# Patient Record
Sex: FEMALE | Race: BLACK OR AFRICAN AMERICAN | NOT HISPANIC OR LATINO | Employment: FULL TIME | URBAN - METROPOLITAN AREA
[De-identification: names, ages, dates, MRNs, and addresses within clinical notes are randomized per-mention and may not be internally consistent; named-entity substitution may affect disease eponyms.]

---

## 2017-12-21 ENCOUNTER — HISTORICAL (OUTPATIENT)
Dept: ADMINISTRATIVE | Facility: HOSPITAL | Age: 32
End: 2017-12-21

## 2017-12-24 LAB — FINAL CULTURE: NORMAL

## 2018-01-04 ENCOUNTER — HISTORICAL (OUTPATIENT)
Dept: ADMINISTRATIVE | Facility: HOSPITAL | Age: 33
End: 2018-01-04

## 2018-01-07 LAB — FINAL CULTURE: NORMAL

## 2020-07-09 ENCOUNTER — HISTORICAL (OUTPATIENT)
Dept: ADMINISTRATIVE | Facility: HOSPITAL | Age: 35
End: 2020-07-09

## 2020-07-09 LAB
ABS NEUT (OLG): 2.74 X10(3)/MCL (ref 2.1–9.2)
ALBUMIN SERPL-MCNC: 3.9 GM/DL (ref 3.4–5)
ALBUMIN/GLOB SERPL: 0.8 RATIO (ref 1.1–2)
ALP SERPL-CCNC: 90 UNIT/L (ref 45–117)
ALT SERPL-CCNC: 33 UNIT/L (ref 12–78)
APPEARANCE, UA: CLEAR
AST SERPL-CCNC: 15 UNIT/L (ref 15–37)
BACTERIA #/AREA URNS AUTO: ABNORMAL /HPF
BASOPHILS # BLD AUTO: 0 X10(3)/MCL (ref 0–0.2)
BASOPHILS NFR BLD AUTO: 1 %
BILIRUB SERPL-MCNC: 0.6 MG/DL (ref 0.2–1)
BILIRUB UR QL STRIP: NEGATIVE
BILIRUBIN DIRECT+TOT PNL SERPL-MCNC: 0.2 MG/DL (ref 0–0.2)
BILIRUBIN DIRECT+TOT PNL SERPL-MCNC: 0.4 MG/DL
BUN SERPL-MCNC: 14 MG/DL (ref 7–18)
C3 SERPL-MCNC: 179 MG/DL (ref 80–173)
C4 SERPL-MCNC: 35.2 MG/DL (ref 13–46)
CALCIUM SERPL-MCNC: 9.1 MG/DL (ref 8.5–10.1)
CHLORIDE SERPL-SCNC: 108 MMOL/L (ref 98–107)
CO2 SERPL-SCNC: 22 MMOL/L (ref 21–32)
COLOR UR: NORMAL
CREAT SERPL-MCNC: 1.3 MG/DL (ref 0.6–1.3)
CREAT UR-MCNC: 160 MG/DL
DEPRECATED CALCIDIOL+CALCIFEROL SERPL-MC: 14.3 NG/ML (ref 30–80)
EOSINOPHIL # BLD AUTO: 0.1 X10(3)/MCL (ref 0–0.9)
EOSINOPHIL NFR BLD AUTO: 2 %
ERYTHROCYTE [DISTWIDTH] IN BLOOD BY AUTOMATED COUNT: 12.3 % (ref 11.5–14.5)
ERYTHROCYTE [SEDIMENTATION RATE] IN BLOOD: 14 MM/HR (ref 0–20)
GLOBULIN SER-MCNC: 4.6 GM/ML (ref 2.3–3.5)
GLUCOSE (UA): NEGATIVE
GLUCOSE SERPL-MCNC: 125 MG/DL (ref 74–106)
HBV CORE AB SERPL QL IA: NONREACTIVE
HBV CORE IGM SERPL QL IA: NONREACTIVE
HBV SURFACE AG SERPL QL IA: NONREACTIVE
HCT VFR BLD AUTO: 39 % (ref 35–46)
HCV AB SERPL QL IA: NONREACTIVE
HGB BLD-MCNC: 12.7 GM/DL (ref 12–16)
HGB UR QL STRIP: NEGATIVE
HYALINE CASTS #/AREA URNS LPF: ABNORMAL /LPF
IMM GRANULOCYTES # BLD AUTO: 0.01 10*3/UL
IMM GRANULOCYTES NFR BLD AUTO: 0 %
KETONES UR QL STRIP: NEGATIVE
LEUKOCYTE ESTERASE UR QL STRIP: NEGATIVE
LYMPHOCYTES # BLD AUTO: 2.1 X10(3)/MCL (ref 0.6–4.6)
LYMPHOCYTES NFR BLD AUTO: 40 %
MCH RBC QN AUTO: 29.1 PG (ref 26–34)
MCHC RBC AUTO-ENTMCNC: 32.6 GM/DL (ref 31–37)
MCV RBC AUTO: 89.4 FL (ref 80–100)
MONOCYTES # BLD AUTO: 0.3 X10(3)/MCL (ref 0.1–1.3)
MONOCYTES NFR BLD AUTO: 5 %
NEG CONT SPOT COUNT: NORMAL
NEUTROPHILS # BLD AUTO: 2.74 X10(3)/MCL (ref 2.1–9.2)
NEUTROPHILS NFR BLD AUTO: 52 %
NITRITE UR QL STRIP: NEGATIVE
PANEL A SPOT COUNT: 0
PANEL B SPOT COUNT: 0
PH UR STRIP: 6.5 [PH] (ref 4.5–8)
PLATELET # BLD AUTO: 201 X10(3)/MCL (ref 130–400)
PMV BLD AUTO: 12.6 FL (ref 7.4–10.4)
POS CONT SPOT COUNT: NORMAL
POTASSIUM SERPL-SCNC: 3.8 MMOL/L (ref 3.5–5.1)
PROT SERPL-MCNC: 8.5 GM/DL (ref 6.4–8.2)
PROT UR QL STRIP: NEGATIVE
PROT UR STRIP-MCNC: 12.2 MG/DL
PROT/CREAT UR-RTO: 76.2 MG/GM
RBC # BLD AUTO: 4.36 X10(6)/MCL (ref 4–5.2)
RBC #/AREA URNS AUTO: ABNORMAL /HPF
SODIUM SERPL-SCNC: 139 MMOL/L (ref 136–145)
SP GR UR STRIP: 1.01 (ref 1–1.03)
SQUAMOUS #/AREA URNS LPF: ABNORMAL /LPF
T-SPOT.TB: NORMAL
T4 FREE SERPL-MCNC: 0.86 NG/DL (ref 0.76–1.46)
TSH SERPL-ACNC: 0.57 MIU/L (ref 0.36–3.74)
UROBILINOGEN UR STRIP-ACNC: NORMAL
WBC # SPEC AUTO: 5.3 X10(3)/MCL (ref 4.5–11)
WBC #/AREA URNS AUTO: ABNORMAL /HPF

## 2020-07-11 LAB — FINAL CULTURE: NO GROWTH

## 2021-09-13 ENCOUNTER — HISTORICAL (OUTPATIENT)
Dept: ADMINISTRATIVE | Facility: HOSPITAL | Age: 36
End: 2021-09-13

## 2021-10-19 ENCOUNTER — HISTORICAL (OUTPATIENT)
Dept: RADIOLOGY | Facility: HOSPITAL | Age: 36
End: 2021-10-19

## 2021-10-19 LAB — CREAT SERPL-MCNC: 1.16 MG/DL (ref 0.55–1.02)

## 2022-04-10 ENCOUNTER — HISTORICAL (OUTPATIENT)
Dept: ADMINISTRATIVE | Facility: HOSPITAL | Age: 37
End: 2022-04-10
Payer: MEDICAID

## 2022-04-26 VITALS
SYSTOLIC BLOOD PRESSURE: 100 MMHG | DIASTOLIC BLOOD PRESSURE: 69 MMHG | BODY MASS INDEX: 39.36 KG/M2 | HEIGHT: 62 IN | WEIGHT: 213.88 LBS

## 2022-05-06 DIAGNOSIS — Z01.818 PREOPERATIVE EXAMINATION, UNSPECIFIED: Primary | ICD-10-CM

## 2022-05-17 RX ORDER — GABAPENTIN 800 MG/1
TABLET ORAL
COMMUNITY
Start: 2022-04-12 | End: 2022-11-29 | Stop reason: SDUPTHER

## 2022-05-17 RX ORDER — TRAMADOL HYDROCHLORIDE 50 MG/1
TABLET ORAL
Status: ON HOLD | COMMUNITY
Start: 2021-12-21 | End: 2022-05-19 | Stop reason: HOSPADM

## 2022-05-17 RX ORDER — LASMIDITAN 100 MG/1
TABLET ORAL
COMMUNITY
Start: 2022-04-06 | End: 2022-08-04 | Stop reason: SDUPTHER

## 2022-05-17 RX ORDER — UBROGEPANT 100 MG/1
TABLET ORAL
COMMUNITY
Start: 2022-03-15 | End: 2022-07-28

## 2022-05-17 RX ORDER — METFORMIN HYDROCHLORIDE 500 MG/1
TABLET, EXTENDED RELEASE ORAL
COMMUNITY
Start: 2022-04-27 | End: 2023-03-27 | Stop reason: SDUPTHER

## 2022-05-17 RX ORDER — AMITRIPTYLINE HYDROCHLORIDE 50 MG/1
TABLET, FILM COATED ORAL
COMMUNITY
Start: 2022-04-12 | End: 2022-12-22

## 2022-05-18 ENCOUNTER — OFFICE VISIT (OUTPATIENT)
Dept: PREADMISSION TESTING | Facility: HOSPITAL | Age: 37
End: 2022-05-18
Attending: ORTHOPAEDIC SURGERY
Payer: MEDICAID

## 2022-05-18 VITALS — WEIGHT: 217.81 LBS | BODY MASS INDEX: 38.58 KG/M2

## 2022-05-18 DIAGNOSIS — Z01.818 PREOPERATIVE EXAMINATION, UNSPECIFIED: ICD-10-CM

## 2022-05-18 LAB
ANION GAP SERPL CALC-SCNC: 7 MEQ/L
BUN SERPL-MCNC: 10.3 MG/DL (ref 7–18.7)
CALCIUM SERPL-MCNC: 9.6 MG/DL (ref 8.4–10.2)
CHLORIDE SERPL-SCNC: 108 MMOL/L (ref 98–107)
CO2 SERPL-SCNC: 24 MMOL/L (ref 22–29)
CREAT SERPL-MCNC: 1.06 MG/DL (ref 0.55–1.02)
CREAT/UREA NIT SERPL: 10
GLUCOSE SERPL-MCNC: 117 MG/DL (ref 74–100)
POTASSIUM SERPL-SCNC: 3.5 MMOL/L (ref 3.5–5.1)
SODIUM SERPL-SCNC: 139 MMOL/L (ref 136–145)

## 2022-05-18 PROCEDURE — 80048 BASIC METABOLIC PNL TOTAL CA: CPT

## 2022-05-18 PROCEDURE — 36415 COLL VENOUS BLD VENIPUNCTURE: CPT

## 2022-05-18 PROCEDURE — 93005 ELECTROCARDIOGRAM TRACING: CPT

## 2022-05-18 RX ORDER — HYDROCODONE BITARTRATE AND ACETAMINOPHEN 5; 325 MG/1; MG/1
1 TABLET ORAL EVERY 6 HOURS PRN
Qty: 10 TABLET | Refills: 0 | Status: SHIPPED | OUTPATIENT
Start: 2022-05-18 | End: 2022-07-28

## 2022-05-18 NOTE — H&P
H&P completed on 5/19 has been reviewed, the patient has been examined and:  I concur with the findings and no changes have occurred since H&P was written.    There are no hospital problems to display for this patient.      Eben Navarro

## 2022-05-19 ENCOUNTER — HOSPITAL ENCOUNTER (OUTPATIENT)
Facility: HOSPITAL | Age: 37
Discharge: HOME | End: 2022-05-19
Attending: SURGERY | Admitting: SURGERY
Payer: MEDICAID

## 2022-05-19 DIAGNOSIS — G56.00 CARPAL TUNNEL SYNDROME: ICD-10-CM

## 2022-05-19 LAB
CTP QC/QA: YES
POCT GLUCOSE: 107 MG/DL (ref 70–110)
POCT GLUCOSE: 112 MG/DL (ref 70–110)
SARS-COV-2 AG RESP QL IA.RAPID: NEGATIVE

## 2022-05-19 PROCEDURE — 25000003 PHARM REV CODE 250: Performed by: SURGERY

## 2022-05-19 PROCEDURE — 64721 CARPAL TUNNEL SURGERY: CPT | Mod: 51,LT,, | Performed by: ORTHOPAEDIC SURGERY

## 2022-05-19 PROCEDURE — 71000015 HC POSTOP RECOV 1ST HR: Performed by: ORTHOPAEDIC SURGERY

## 2022-05-19 PROCEDURE — 63600175 PHARM REV CODE 636 W HCPCS: Performed by: SPECIALIST

## 2022-05-19 PROCEDURE — 01810 ANES PX NRV MUSC F/ARM WRST: CPT | Performed by: ORTHOPAEDIC SURGERY

## 2022-05-19 PROCEDURE — 36000707: Performed by: ORTHOPAEDIC SURGERY

## 2022-05-19 PROCEDURE — 64718 PR REVISE ULNAR NERVE AT ELBOW: ICD-10-PCS | Mod: LT,,, | Performed by: ORTHOPAEDIC SURGERY

## 2022-05-19 PROCEDURE — 71000033 HC RECOVERY, INTIAL HOUR: Performed by: ORTHOPAEDIC SURGERY

## 2022-05-19 PROCEDURE — 37000009 HC ANESTHESIA EA ADD 15 MINS: Performed by: ORTHOPAEDIC SURGERY

## 2022-05-19 PROCEDURE — 36000706: Performed by: ORTHOPAEDIC SURGERY

## 2022-05-19 PROCEDURE — 25000003 PHARM REV CODE 250: Performed by: SPECIALIST

## 2022-05-19 PROCEDURE — 37000008 HC ANESTHESIA 1ST 15 MINUTES: Performed by: ORTHOPAEDIC SURGERY

## 2022-05-19 PROCEDURE — 71000016 HC POSTOP RECOV ADDL HR: Performed by: ORTHOPAEDIC SURGERY

## 2022-05-19 PROCEDURE — 64718 REVISE ULNAR NERVE AT ELBOW: CPT | Mod: LT,,, | Performed by: ORTHOPAEDIC SURGERY

## 2022-05-19 PROCEDURE — 64721 PR REVISE MEDIAN N/CARPAL TUNNEL SURG: ICD-10-PCS | Mod: 51,LT,, | Performed by: ORTHOPAEDIC SURGERY

## 2022-05-19 RX ORDER — LIDOCAINE HYDROCHLORIDE 10 MG/ML
INJECTION INFILTRATION; PERINEURAL
Status: DISCONTINUED | OUTPATIENT
Start: 2022-05-19 | End: 2022-05-19 | Stop reason: HOSPADM

## 2022-05-19 RX ORDER — MIDAZOLAM HYDROCHLORIDE 5 MG/ML
INJECTION INTRAMUSCULAR; INTRAVENOUS
Status: DISCONTINUED
Start: 2022-05-19 | End: 2022-05-19 | Stop reason: HOSPADM

## 2022-05-19 RX ORDER — LIDOCAINE HYDROCHLORIDE 10 MG/ML
1 INJECTION, SOLUTION EPIDURAL; INFILTRATION; INTRACAUDAL; PERINEURAL ONCE
Status: DISCONTINUED | OUTPATIENT
Start: 2022-05-19 | End: 2022-08-04

## 2022-05-19 RX ORDER — BUPIVACAINE HYDROCHLORIDE 2.5 MG/ML
INJECTION, SOLUTION EPIDURAL; INFILTRATION; INTRACAUDAL
Status: DISCONTINUED
Start: 2022-05-19 | End: 2022-05-19 | Stop reason: HOSPADM

## 2022-05-19 RX ORDER — HYDROMORPHONE HYDROCHLORIDE 1 MG/ML
0.2 INJECTION, SOLUTION INTRAMUSCULAR; INTRAVENOUS; SUBCUTANEOUS EVERY 5 MIN PRN
Status: DISCONTINUED | OUTPATIENT
Start: 2022-05-19 | End: 2022-05-19 | Stop reason: HOSPADM

## 2022-05-19 RX ORDER — MIDAZOLAM HYDROCHLORIDE 5 MG/ML
5 INJECTION INTRAMUSCULAR; INTRAVENOUS ONCE
Status: COMPLETED | OUTPATIENT
Start: 2022-05-19 | End: 2022-05-19

## 2022-05-19 RX ORDER — MIDAZOLAM HYDROCHLORIDE 1 MG/ML
2 INJECTION INTRAMUSCULAR; INTRAVENOUS ONCE AS NEEDED
Status: CANCELLED | OUTPATIENT
Start: 2022-05-19 | End: 2033-10-15

## 2022-05-19 RX ORDER — LIDOCAINE HYDROCHLORIDE 10 MG/ML
INJECTION, SOLUTION EPIDURAL; INFILTRATION; INTRACAUDAL; PERINEURAL
Status: DISCONTINUED
Start: 2022-05-19 | End: 2022-05-19 | Stop reason: HOSPADM

## 2022-05-19 RX ORDER — CEFAZOLIN SODIUM 2 G/50ML
2 SOLUTION INTRAVENOUS ONCE
Status: DISCONTINUED | OUTPATIENT
Start: 2022-05-19 | End: 2022-11-08

## 2022-05-19 RX ORDER — ROPIVACAINE HYDROCHLORIDE 5 MG/ML
INJECTION, SOLUTION EPIDURAL; INFILTRATION; PERINEURAL
Status: DISCONTINUED
Start: 2022-05-19 | End: 2022-05-19 | Stop reason: HOSPADM

## 2022-05-19 RX ORDER — OXYCODONE AND ACETAMINOPHEN 5; 325 MG/1; MG/1
2 TABLET ORAL
Status: DISCONTINUED | OUTPATIENT
Start: 2022-05-19 | End: 2022-05-19 | Stop reason: HOSPADM

## 2022-05-19 RX ORDER — HYDROMORPHONE HYDROCHLORIDE 1 MG/ML
0.5 INJECTION, SOLUTION INTRAMUSCULAR; INTRAVENOUS; SUBCUTANEOUS EVERY 5 MIN PRN
Status: DISCONTINUED | OUTPATIENT
Start: 2022-05-19 | End: 2022-05-19 | Stop reason: HOSPADM

## 2022-05-19 RX ORDER — BUPIVACAINE HYDROCHLORIDE 2.5 MG/ML
INJECTION, SOLUTION EPIDURAL; INFILTRATION; INTRACAUDAL
Status: DISCONTINUED | OUTPATIENT
Start: 2022-05-19 | End: 2022-05-19 | Stop reason: HOSPADM

## 2022-05-19 RX ORDER — SODIUM CHLORIDE, SODIUM LACTATE, POTASSIUM CHLORIDE, CALCIUM CHLORIDE 600; 310; 30; 20 MG/100ML; MG/100ML; MG/100ML; MG/100ML
INJECTION, SOLUTION INTRAVENOUS CONTINUOUS
Status: DISCONTINUED | OUTPATIENT
Start: 2022-05-19 | End: 2022-05-19 | Stop reason: HOSPADM

## 2022-05-19 RX ADMIN — HYDROMORPHONE HYDROCHLORIDE 0.5 MG: 1 INJECTION, SOLUTION INTRAMUSCULAR; INTRAVENOUS; SUBCUTANEOUS at 10:05

## 2022-05-19 RX ADMIN — HYDROMORPHONE HYDROCHLORIDE 0.5 MG: 1 INJECTION, SOLUTION INTRAMUSCULAR; INTRAVENOUS; SUBCUTANEOUS at 09:05

## 2022-05-19 RX ADMIN — OXYCODONE AND ACETAMINOPHEN 2 TABLET: 5; 325 TABLET ORAL at 10:05

## 2022-05-19 RX ADMIN — MIDAZOLAM 5 MG: 5 INJECTION INTRAMUSCULAR; INTRAVENOUS at 07:05

## 2022-05-19 RX ADMIN — SODIUM CHLORIDE, POTASSIUM CHLORIDE, SODIUM LACTATE AND CALCIUM CHLORIDE: 600; 310; 30; 20 INJECTION, SOLUTION INTRAVENOUS at 07:05

## 2022-05-19 NOTE — DISCHARGE SUMMARY
[facility] - Periop Services  Discharge Note  Short Stay    * No procedures listed *    OUTCOME: Patient tolerated treatment/procedure well without complication and is now ready for discharge.    DISPOSITION: Home or Self Care    FINAL DIAGNOSIS:  <principal problem not specified>    FOLLOWUP: In clinic    DISCHARGE INSTRUCTIONS:  No discharge procedures on file.     TIME SPENT ON DISCHARGE: 30 minutes

## 2022-05-19 NOTE — DISCHARGE INSTRUCTIONS
Keep arm elevated if hurting, may bend at the elbow but not the wrist for 2 weeks, dressing stays on til Monday then may remove, wash wound with soap and water pat dry, no baths showers only. Follow up with clinic in 2 weeks.

## 2022-05-19 NOTE — OP NOTE
Operative Report    CINDY JURADO  : 1985  MRN: [id number]    Date of Procedure: 2022     Pre-op Diagnosis:      Left Carpal tunnel syndrome  Left Cubital tunnel syndrome      Post-op Diagnosis:       Same     Post-Op Diagnosis Codes:     * Carpal tunnel syndrome of left wrist [G56.02]     * Cubital tunnel syndrome on left [G56.22]    Procedure:     Left Carpal tunnel release  Left Cubital tunnel release      Surgeon: Dr Eben Bolanos     Assisting Surgeon:      Dr Jean PGY5, Dr Navarro PGY3    Anesthesiologist:  see record    Anesthesia:      general    TOURNIQUET: 250mmHg  for 31 min    Estimated Blood Loss: 30 cc         Specimens: none    Indications for surgery:     Cindy Jurado is a 36 y.o. female patient with a longstanding history of carpal and cubital tunnel syndrome.  The patient has failed a trial of conservative care including night splinting and therapy.  While in clinic the risks, benefits, outcomes and alternatives of conservative vs operative management were discussed with the patient and informed consent was obtained for carpal and cubital tunnel release.    Procedure in detail:     The patient was placed under LMA anesthesia.  The upper extremity was prepped and draped in the usual sterile fashion. The limb was exsanguinated with eschmar and tourniquet was inflated to 250 mmHg. A 7-8 cm incision was made using a 15 blade scalpel slightly anterior to the cubital tunnel in the arm. Dissection was carried down through subcutaneous tissue. The fascia overlying the ulnar nerve was then released using tenotomy scissors. The totality of the ulnar nerve was identified and released using tenotomy scissors. Lovelace ligament as well as the heads of the FCU muscle were released. We confirmed complete release both proximally and distally.  Elbow was taken through a range of motion and the ulnar nerve was found to be stable in the groove.  A sponge was placed in the wound for  coverage.    We then turned our attention to the carpal tunnel. A 3-4 cm incision was placed in line with the flexed 4th digit over the carpal tunnel. Meticulous hemostasis was obtained using a combination of bipolar and Bovie cautery. The transverse carpal ligament was incised using a 15 blade scalpel after the skin and transverse palmar fascia was incised using a 15 blade scalpel. The transcarpal ligament was released in its entirety using tenotomy scissors.     Wound was thoroughly irrigated, the tourniquet was released and meticulous hemostasis was attained.  Elbow wound was closed 2-0 Vicryl in the subcutaneous tissue followed by 3-0 Monocryl in the skin. Several interrupted 4 nylons were then placed. Sterile dressing was applied.    Finger perfusion was confirmed at the end of the case.     I was present for all critical steps of the procedure    POSTOPERATIVE PLAN: Patient will keep their dressing clean/dry/intact and will restrict their activity to gentle range of motion exercises only until seen in follow up. A follow up appointment will be made for the patient before leaving the hospital to see us in clinic in 1-2 weeks.

## 2022-05-20 ENCOUNTER — TELEPHONE (OUTPATIENT)
Dept: ORTHOPEDICS | Facility: CLINIC | Age: 37
End: 2022-05-20
Payer: MEDICAID

## 2022-05-20 VITALS
DIASTOLIC BLOOD PRESSURE: 89 MMHG | RESPIRATION RATE: 15 BRPM | HEART RATE: 89 BPM | SYSTOLIC BLOOD PRESSURE: 132 MMHG | HEIGHT: 63 IN | OXYGEN SATURATION: 100 % | TEMPERATURE: 97 F | BODY MASS INDEX: 38.98 KG/M2 | WEIGHT: 220 LBS

## 2022-05-20 NOTE — TELEPHONE ENCOUNTER
Notified patient of your response. She stated that she can not take NSAIDS due to being on Eliquis. Patient asked, if possible to get more to make sure she has  enough for weekend. She stated she will run out before Monday.       Patient called early this morning with complaints of increased pain since surgery on Thursday. Patient stated she is taking Norco 5mg/325 mg 1 pill every 6 hours. She stated that her pain was so bad that she took 2 pills at 0200, and still in a lot of pain and not able to sleep. Pharmacy updated in chart at time of the conversation. Please advise.

## 2022-05-23 ENCOUNTER — OFFICE VISIT (OUTPATIENT)
Dept: ORTHOPEDICS | Facility: CLINIC | Age: 37
End: 2022-05-23
Payer: MEDICAID

## 2022-05-23 VITALS — BODY MASS INDEX: 38.98 KG/M2 | HEIGHT: 63 IN | WEIGHT: 220 LBS

## 2022-05-23 DIAGNOSIS — G56.00 CARPAL TUNNEL SYNDROME, UNSPECIFIED LATERALITY: Primary | ICD-10-CM

## 2022-05-23 PROCEDURE — 99024 PR POST-OP FOLLOW-UP VISIT: ICD-10-PCS | Mod: ,,, | Performed by: ORTHOPAEDIC SURGERY

## 2022-05-23 PROCEDURE — 99213 OFFICE O/P EST LOW 20 MIN: CPT | Mod: PBBFAC

## 2022-05-23 PROCEDURE — 99024 POSTOP FOLLOW-UP VISIT: CPT | Mod: ,,, | Performed by: ORTHOPAEDIC SURGERY

## 2022-05-23 NOTE — PROGRESS NOTES
[facility] Orthopaedic Surgery Clinic Note    In brief, Cindy Garcia is a 36 y.o. female left carpal cubital tunnel release on 05/19/2022.  Patient is having some pain postoperatively.  Today patient states that she was having a lot of pain postoperatively.  He is somewhat improved now.  She has been working on moving her fingers.  Denies any numbness tingling.      PMH:   Past Medical History:   Diagnosis Date    Diabetes mellitus     Digestive disorder     Hypertension        PSH:   Past Surgical History:   Procedure Laterality Date    ADENOIDECTOMY N/A     CARPAL TUNNEL RELEASE Left 5/19/2022    Procedure: RELEASE, CARPAL TUNNEL;  Surgeon: Eben Bolanos MD;  Location: [facility];  Service: Orthopedics;  Laterality: Left;    CHOLECYSTECTOMY N/A     laryngoscopy and other tracheoscopy N/A 12/23/2014    ULNAR TUNNEL RELEASE Left 5/19/2022    Procedure: RELEASE, CUBITAL TUNNEL;  Surgeon: Eben Bolanos MD;  Location: [facility];  Service: Orthopedics;  Laterality: Left;       SH:   Social History     Socioeconomic History    Marital status:    Tobacco Use    Smoking status: Never Smoker    Smokeless tobacco: Never Used   Substance and Sexual Activity    Alcohol use: Not Currently    Drug use: Never    Sexual activity: Yes       FH: No family history on file.    Allergies: Review of patient's allergies indicates:  No Known Allergies    ROS:  Constitutional- no fever, fatigue, weakness  Eye- no vision loss, eye redness, drainage, or pain  ENMT- no sore throat, ear pain, sinus pain/congestion, nasal congestion/drainage  Respiratory- no cough, wheezing, or shortness of breath  CV- no chest pain, no palpitations, no edema  GI- no N/V/D; no abdominal pain    Physical Exam:  There were no vitals filed for this visit.    General: NAD  Cardio: RRR by peripheral pulse  Pulm: Normal WOB on room air, symmetric chest rise  Abd: Soft, NT/ND    MSK:  Left upper extremity:  Surgical incision clean dry intact  with sutures in place  No signs of infection  Minimal bruising around surgical site  Neurovascular intact    Assessment:  28-year-old female status post left carpal and cubital tunnel release on 05/19/2022    -continue making a fist and moving the elbow, return to clinic at regularly scheduled appointment    Eben Navarro MD  [facility]  5/23/2022 3:04 PM

## 2022-05-24 NOTE — PROGRESS NOTES
ATTENDING ADDENDUM    Cindy Garcia  was evaluated at the time of the encounter with Dr Navarro PGY3.  HPI, PE and treatment plan was reviewed. Treatment plan was reasonable and necessary. Imaging was reviewed at the time of visit.

## 2022-06-08 ENCOUNTER — OFFICE VISIT (OUTPATIENT)
Dept: ORTHOPEDICS | Facility: CLINIC | Age: 37
End: 2022-06-08
Payer: MEDICAID

## 2022-06-08 VITALS — BODY MASS INDEX: 38.45 KG/M2 | WEIGHT: 217 LBS | HEIGHT: 63 IN

## 2022-06-08 DIAGNOSIS — G56.22 CUBITAL TUNNEL SYNDROME ON LEFT: ICD-10-CM

## 2022-06-08 DIAGNOSIS — G56.00 CARPAL TUNNEL SYNDROME, UNSPECIFIED LATERALITY: Primary | ICD-10-CM

## 2022-06-08 PROCEDURE — 99024 POSTOP FOLLOW-UP VISIT: CPT | Mod: ,,, | Performed by: ORTHOPAEDIC SURGERY

## 2022-06-08 PROCEDURE — 99213 OFFICE O/P EST LOW 20 MIN: CPT | Mod: PBBFAC

## 2022-06-08 PROCEDURE — 99024 PR POST-OP FOLLOW-UP VISIT: ICD-10-PCS | Mod: ,,, | Performed by: ORTHOPAEDIC SURGERY

## 2022-06-08 RX ORDER — HYDROCODONE BITARTRATE AND ACETAMINOPHEN 5; 325 MG/1; MG/1
1 TABLET ORAL EVERY 6 HOURS PRN
Qty: 18 TABLET | Refills: 0 | Status: SHIPPED | OUTPATIENT
Start: 2022-06-08 | End: 2022-06-15

## 2022-06-08 NOTE — PROGRESS NOTES
[facility] Orthopaedic Surgery Clinic Note    In brief, Cindy Garcia is a 36 y.o. female left carpal cubital tunnel release on 05/19/2022.  She returned 1 week postoperatively because she was having significant pain.  She feels like he has improved somewhat with regard to specially with her elbow.  Her wrist is still swollen and sore and she does still have difficulty moving her fingers.  She says that the entirety of her hand is numb.    PMH:   Past Medical History:   Diagnosis Date    Diabetes mellitus     Digestive disorder     Hypertension        PSH:   Past Surgical History:   Procedure Laterality Date    ADENOIDECTOMY N/A     CARPAL TUNNEL RELEASE Left 5/19/2022    Procedure: RELEASE, CARPAL TUNNEL;  Surgeon: Eben Bolanos MD;  Location: [facility];  Service: Orthopedics;  Laterality: Left;    CHOLECYSTECTOMY N/A     laryngoscopy and other tracheoscopy N/A 12/23/2014    ULNAR TUNNEL RELEASE Left 5/19/2022    Procedure: RELEASE, CUBITAL TUNNEL;  Surgeon: Eben Bolanos MD;  Location: [facility];  Service: Orthopedics;  Laterality: Left;       SH:   Social History     Socioeconomic History    Marital status:    Tobacco Use    Smoking status: Never Smoker    Smokeless tobacco: Never Used   Substance and Sexual Activity    Alcohol use: Not Currently    Drug use: Never    Sexual activity: Yes       FH: No family history on file.    Allergies: Review of patient's allergies indicates:  No Known Allergies    ROS:  Constitutional- no fever, fatigue, weakness  Eye- no vision loss, eye redness, drainage, or pain  ENMT- no sore throat, ear pain, sinus pain/congestion, nasal congestion/drainage  Respiratory- no cough, wheezing, or shortness of breath  CV- no chest pain, no palpitations, no edema  GI- no N/V/D; no abdominal pain    Physical Exam:  There were no vitals filed for this visit.    General: NAD  Cardio: RRR by peripheral pulse  Pulm: Normal WOB on room air, symmetric chest rise  Abd: Soft,  NT/ND    MSK:  Left upper extremity:  Surgical incision clean dry intact with sutures in place  No signs of infection  2 pt discrimination > 1 cm all digits today   +ain/pin/ulnar  Able to make full composite fist but slowly and with some pain with full flexion    Assessment:  28-year-old female status post left carpal and cubital tunnel release on 05/19/2022    -continue working on range of motion. Likely has some increased post-operative swelling than most.  sutures out today. Follow-up in 4 weeks for repeat range of motion and 2 pt discrimination check.     Corinne E Cloud, MD  [facility] Orthopaedic Surgery  6/8/2022 3:04 PM

## 2022-06-14 PROBLEM — G43.E09 CHRONIC MIGRAINE WITH AURA: Status: ACTIVE | Noted: 2022-06-14

## 2022-07-27 RX ORDER — TOPIRAMATE 100 MG/1
1 TABLET, FILM COATED ORAL 2 TIMES DAILY
COMMUNITY
Start: 2022-07-06 | End: 2022-08-04 | Stop reason: SDUPTHER

## 2022-07-27 RX ORDER — DILTIAZEM HYDROCHLORIDE 120 MG/1
1 CAPSULE, EXTENDED RELEASE ORAL DAILY
COMMUNITY
Start: 2022-07-06 | End: 2022-08-04

## 2022-07-28 ENCOUNTER — OFFICE VISIT (OUTPATIENT)
Dept: INTERNAL MEDICINE | Facility: CLINIC | Age: 37
End: 2022-07-28
Payer: MEDICAID

## 2022-07-28 VITALS
SYSTOLIC BLOOD PRESSURE: 116 MMHG | HEART RATE: 106 BPM | WEIGHT: 216 LBS | BODY MASS INDEX: 38.27 KG/M2 | RESPIRATION RATE: 18 BRPM | HEIGHT: 63 IN | DIASTOLIC BLOOD PRESSURE: 82 MMHG | TEMPERATURE: 98 F | OXYGEN SATURATION: 99 %

## 2022-07-28 DIAGNOSIS — Z11.4 SCREENING FOR HIV (HUMAN IMMUNODEFICIENCY VIRUS): ICD-10-CM

## 2022-07-28 DIAGNOSIS — Z11.59 SCREENING FOR VIRAL DISEASE: ICD-10-CM

## 2022-07-28 DIAGNOSIS — Z13.21 ENCOUNTER FOR VITAMIN DEFICIENCY SCREENING: ICD-10-CM

## 2022-07-28 DIAGNOSIS — Z13.1 SCREENING FOR DIABETES MELLITUS: ICD-10-CM

## 2022-07-28 DIAGNOSIS — Z13.0 SCREENING FOR IRON DEFICIENCY ANEMIA: ICD-10-CM

## 2022-07-28 DIAGNOSIS — Z13.29 SCREENING FOR THYROID DISORDER: ICD-10-CM

## 2022-07-28 DIAGNOSIS — Z11.3 ROUTINE SCREENING FOR STI (SEXUALLY TRANSMITTED INFECTION): ICD-10-CM

## 2022-07-28 DIAGNOSIS — Z13.220 SCREENING FOR LIPID DISORDERS: ICD-10-CM

## 2022-07-28 DIAGNOSIS — Z00.00 WELL ADULT EXAM: ICD-10-CM

## 2022-07-28 DIAGNOSIS — R52 GENERALIZED PAIN: Primary | ICD-10-CM

## 2022-07-28 PROCEDURE — 99214 OFFICE O/P EST MOD 30 MIN: CPT | Mod: S$PBB,,,

## 2022-07-28 PROCEDURE — 99215 OFFICE O/P EST HI 40 MIN: CPT | Mod: PBBFAC

## 2022-07-28 PROCEDURE — 3079F PR MOST RECENT DIASTOLIC BLOOD PRESSURE 80-89 MM HG: ICD-10-PCS | Mod: CPTII,,,

## 2022-07-28 PROCEDURE — 1159F PR MEDICATION LIST DOCUMENTED IN MEDICAL RECORD: ICD-10-PCS | Mod: CPTII,,,

## 2022-07-28 PROCEDURE — 3008F BODY MASS INDEX DOCD: CPT | Mod: CPTII,,,

## 2022-07-28 PROCEDURE — 3074F SYST BP LT 130 MM HG: CPT | Mod: CPTII,,,

## 2022-07-28 PROCEDURE — 3008F PR BODY MASS INDEX (BMI) DOCUMENTED: ICD-10-PCS | Mod: CPTII,,,

## 2022-07-28 PROCEDURE — 1160F RVW MEDS BY RX/DR IN RCRD: CPT | Mod: CPTII,,,

## 2022-07-28 PROCEDURE — 3079F DIAST BP 80-89 MM HG: CPT | Mod: CPTII,,,

## 2022-07-28 PROCEDURE — 3074F PR MOST RECENT SYSTOLIC BLOOD PRESSURE < 130 MM HG: ICD-10-PCS | Mod: CPTII,,,

## 2022-07-28 PROCEDURE — 99214 PR OFFICE/OUTPT VISIT, EST, LEVL IV, 30-39 MIN: ICD-10-PCS | Mod: S$PBB,,,

## 2022-07-28 PROCEDURE — 1159F MED LIST DOCD IN RCRD: CPT | Mod: CPTII,,,

## 2022-07-28 PROCEDURE — 1160F PR REVIEW ALL MEDS BY PRESCRIBER/CLIN PHARMACIST DOCUMENTED: ICD-10-PCS | Mod: CPTII,,,

## 2022-07-28 RX ORDER — TRAMADOL HYDROCHLORIDE 50 MG/1
50 TABLET ORAL EVERY 6 HOURS
Qty: 28 TABLET | Refills: 0 | Status: SHIPPED | OUTPATIENT
Start: 2022-07-28 | End: 2023-03-27

## 2022-07-28 RX ORDER — BACLOFEN 10 MG/1
10 TABLET ORAL 3 TIMES DAILY
Qty: 90 TABLET | Refills: 1 | Status: SHIPPED | OUTPATIENT
Start: 2022-07-28 | End: 2022-09-20 | Stop reason: SDUPTHER

## 2022-07-28 NOTE — ASSESSMENT & PLAN NOTE
Body mass index is 38.26 kg/m².  Goal BMI <30.  Aerobic exercise 150 minutes per week.  Avoid soda, simple sugars, sweets, excessive rice, pasta, potatoes or bread.   Choose brown options when available and portion control.  Limit fast foods and fried foods.   Choose complex carbs in moderation (ex: green, leafy vegetables, beans, oatmeal).  Eat plenty of fresh fruits and vegetables with lean meats daily.   Consider permanent healthy lifestyle changes.

## 2022-07-28 NOTE — PROGRESS NOTES
Subjective:       Patient ID: Cindy Garcia is a 36 y.o. female.    Chief Complaint: Establish Care and generalized pain    HPI   Cindy Garcia is a 36 y.o. Black female presenting in clinic today to Establish Care and generalized pain. Previous PCP: TEDDY Gottlieb. PMH PE, optic neuritis, peripheral neuropathy, HERB, Meniere's, TMJ, cluster headaches, GERD, HTN, diverticulosis, DDD w/ herniated disc, and fibromyalgia. She is followed by [facility] neuro clinic for headaches, Dr. Ramsay (ENT) in [place], Dr. Sharon Casiano (GYN), Dr. Chinmay Benitez (GI), and [facility] in [place] for multiple PE's, palpitations, and SOB. She was previously followed by Dr. Andersen (rheumatology) and a rheumatology clinic in [place].    Today she denies any acute complaints. She states that she has been experiencing generalized pain. Current pain is 6/10, aching. Denies radiation or numbness and tingling. She is currently taking tramadol and gabapentin with minimal relief. She does have DDD with a herniated disc, but she says she was diagnosed with fibromyalgia, but she could never get a definitive answer. She says the back and forth to [place] and multiple testing caused her to be frustrated. She just wants to feel better.    Denies smoking, drinking, or illicit drug use. Denies chest pain, shortness of breath, cough, headache, dizziness, weakness, abdominal pain, nausea, vomiting, diarrhea, constipation, dysuria, depression, anxiety, SI, and HI.    Cervical Cancer Screening - Next appt is in 9/2022, she will send records.   Breast Cancer Screening - Deferred due to age.  Colon Cancer Screening - Deferred due to age.  Vaccinations: Declines vaccines.         Review of Systems   Constitutional: Negative.    HENT: Negative.    Eyes: Negative.    Respiratory: Negative.    Cardiovascular: Negative.    Gastrointestinal: Negative.    Endocrine: Negative.    Genitourinary: Negative.    Musculoskeletal:  Positive for arthralgias and back pain.   Integumentary:  Negative.   Allergic/Immunologic: Negative.    Neurological: Negative.    Hematological: Negative.    Psychiatric/Behavioral: Negative.    All other systems reviewed and are negative.        Objective:      Physical Exam  Constitutional:       Appearance: Normal appearance. She is normal weight.   HENT:      Head: Normocephalic and atraumatic.      Right Ear: External ear normal.      Left Ear: External ear normal.      Nose: Nose normal.      Mouth/Throat:      Mouth: Mucous membranes are moist.      Pharynx: Oropharynx is clear.   Eyes:      Extraocular Movements: Extraocular movements intact.      Conjunctiva/sclera: Conjunctivae normal.      Pupils: Pupils are equal, round, and reactive to light.   Cardiovascular:      Rate and Rhythm: Normal rate and regular rhythm.      Pulses: Normal pulses.      Heart sounds: Normal heart sounds.   Pulmonary:      Effort: Pulmonary effort is normal.      Breath sounds: Normal breath sounds.   Abdominal:      General: Bowel sounds are normal.      Palpations: Abdomen is soft.   Musculoskeletal:         General: Normal range of motion.      Cervical back: Normal range of motion and neck supple.   Skin:     General: Skin is warm and dry.      Capillary Refill: Capillary refill takes less than 2 seconds.   Neurological:      General: No focal deficit present.      Mental Status: She is alert and oriented to person, place, and time.   Psychiatric:         Mood and Affect: Mood normal.         Behavior: Behavior normal.         Thought Content: Thought content normal.         Judgment: Judgment normal.         Assessment:       Problem List Items Addressed This Visit        Endocrine    BMI 38.0-38.9,adult     Body mass index is 38.26 kg/m².  Goal BMI <30.  Aerobic exercise 150 minutes per week.  Avoid soda, simple sugars, sweets, excessive rice, pasta, potatoes or bread.   Choose brown options when available and portion  control.  Limit fast foods and fried foods.   Choose complex carbs in moderation (ex: green, leafy vegetables, beans, oatmeal).  Eat plenty of fresh fruits and vegetables with lean meats daily.   Consider permanent healthy lifestyle changes.                  Other    Generalized pain - Primary     List of pain management doctors provided.  Rx baclofen.  Tramadol refilled for 7 days.  Labs ordered: uric acid, DAVE, sed rate, CRP, cyclic citrul peptide antibody, IgG, SM and SM/RNP antibodies.  Will re-refer to Rheumatology if positive.         Relevant Medications    baclofen (LIORESAL) 10 MG tablet    traMADoL (ULTRAM) 50 mg tablet    Other Relevant Orders    Antinuclear Antibodies Direct    C-Reactive Protein    Sedimentation rate    SM and SM/RNP Antibodies    Uric Acid    Cyclic citrul peptide antibody, IgG      Other Visit Diagnoses     Well adult exam        Relevant Orders    CBC Auto Differential    Comprehensive Metabolic Panel    Microalbumin/Creatinine Ratio, Urine    Urinalysis, Reflex to Urine Culture Urine, Clean Catch    Screening for viral disease        Relevant Orders    Hepatitis Panel, Acute    Hepatitis C antibody    Screening for thyroid disorder        Relevant Orders    T4, Free    TSH    Routine screening for STI (sexually transmitted infection)        Relevant Orders    Chlamydia/GC, PCR    SYPHILIS ANTIBODY (WITH REFLEX RPR)    Screening for iron deficiency anemia        Relevant Orders    Ferritin    Iron and TIBC    Screening for lipid disorders        Relevant Orders    Lipid Panel    Screening for HIV (human immunodeficiency virus)        Relevant Orders    HIV 1/2 Ag/Ab (4th Gen)    Screening for diabetes mellitus        Relevant Orders    Hemoglobin A1C    Encounter for vitamin deficiency screening        Relevant Orders    Vitamin D    Vitamin B12    Folate            Lab review in one week via audio only.  RTC prn.      JAIMIE Sutton  07/28/2022

## 2022-08-04 ENCOUNTER — OFFICE VISIT (OUTPATIENT)
Dept: NEUROLOGY | Facility: CLINIC | Age: 37
End: 2022-08-04
Payer: MEDICAID

## 2022-08-04 VITALS
DIASTOLIC BLOOD PRESSURE: 74 MMHG | HEIGHT: 63 IN | OXYGEN SATURATION: 99 % | SYSTOLIC BLOOD PRESSURE: 105 MMHG | TEMPERATURE: 98 F | WEIGHT: 216.94 LBS | RESPIRATION RATE: 20 BRPM | BODY MASS INDEX: 38.44 KG/M2 | HEART RATE: 118 BPM

## 2022-08-04 DIAGNOSIS — G43.E09 CHRONIC MIGRAINE WITH AURA: Primary | ICD-10-CM

## 2022-08-04 PROCEDURE — 3008F BODY MASS INDEX DOCD: CPT | Mod: CPTII,,, | Performed by: NURSE PRACTITIONER

## 2022-08-04 PROCEDURE — 99214 OFFICE O/P EST MOD 30 MIN: CPT | Mod: PBBFAC | Performed by: NURSE PRACTITIONER

## 2022-08-04 PROCEDURE — 3078F PR MOST RECENT DIASTOLIC BLOOD PRESSURE < 80 MM HG: ICD-10-PCS | Mod: CPTII,,, | Performed by: NURSE PRACTITIONER

## 2022-08-04 PROCEDURE — 3074F SYST BP LT 130 MM HG: CPT | Mod: CPTII,,, | Performed by: NURSE PRACTITIONER

## 2022-08-04 PROCEDURE — 3008F PR BODY MASS INDEX (BMI) DOCUMENTED: ICD-10-PCS | Mod: CPTII,,, | Performed by: NURSE PRACTITIONER

## 2022-08-04 PROCEDURE — 3074F PR MOST RECENT SYSTOLIC BLOOD PRESSURE < 130 MM HG: ICD-10-PCS | Mod: CPTII,,, | Performed by: NURSE PRACTITIONER

## 2022-08-04 PROCEDURE — 99214 PR OFFICE/OUTPT VISIT, EST, LEVL IV, 30-39 MIN: ICD-10-PCS | Mod: S$PBB,,, | Performed by: NURSE PRACTITIONER

## 2022-08-04 PROCEDURE — 1159F PR MEDICATION LIST DOCUMENTED IN MEDICAL RECORD: ICD-10-PCS | Mod: CPTII,,, | Performed by: NURSE PRACTITIONER

## 2022-08-04 PROCEDURE — 1160F RVW MEDS BY RX/DR IN RCRD: CPT | Mod: CPTII,,, | Performed by: NURSE PRACTITIONER

## 2022-08-04 PROCEDURE — 1160F PR REVIEW ALL MEDS BY PRESCRIBER/CLIN PHARMACIST DOCUMENTED: ICD-10-PCS | Mod: CPTII,,, | Performed by: NURSE PRACTITIONER

## 2022-08-04 PROCEDURE — 3078F DIAST BP <80 MM HG: CPT | Mod: CPTII,,, | Performed by: NURSE PRACTITIONER

## 2022-08-04 PROCEDURE — 1159F MED LIST DOCD IN RCRD: CPT | Mod: CPTII,,, | Performed by: NURSE PRACTITIONER

## 2022-08-04 PROCEDURE — 99214 OFFICE O/P EST MOD 30 MIN: CPT | Mod: S$PBB,,, | Performed by: NURSE PRACTITIONER

## 2022-08-04 RX ORDER — LASMIDITAN 100 MG/1
TABLET ORAL
Qty: 16 TABLET | Refills: 2 | Status: SHIPPED | OUTPATIENT
Start: 2022-08-04 | End: 2022-11-08 | Stop reason: SDUPTHER

## 2022-08-04 RX ORDER — TOPIRAMATE SPINKLE 25 MG/1
25 CAPSULE ORAL 2 TIMES DAILY
Qty: 60 CAPSULE | Refills: 3 | Status: SHIPPED | OUTPATIENT
Start: 2022-08-04 | End: 2022-11-08

## 2022-08-04 RX ORDER — TOPIRAMATE 100 MG/1
100 TABLET, FILM COATED ORAL 2 TIMES DAILY
Qty: 180 TABLET | Refills: 2 | Status: SHIPPED | OUTPATIENT
Start: 2022-08-04 | End: 2022-11-02

## 2022-08-04 RX ORDER — GALCANEZUMAB 120 MG/ML
120 INJECTION, SOLUTION SUBCUTANEOUS
Qty: 1 EACH | Refills: 11 | Status: SHIPPED | OUTPATIENT
Start: 2022-08-04 | End: 2022-11-08 | Stop reason: SDUPTHER

## 2022-08-04 NOTE — PROGRESS NOTES
Subjective:       Patient ID: Cindy Garcia is a 36 y.o. female.    Chief Complaint: Migraine    HPI   This is a 36 year old female with PMHX of PEs, HERB, TMJ issues, HTN, GERD, Fibromyalgia, Grave's Disease, Sickle Cell Trait, TIA, who was referred for medically intractable chronic migraine with visual aura with status migrainosus not responding to Topamax or Elavil. Also complaining of insomnia. Patient was last seen on 2/8/2022.     During that visit, amitriptyline was titrated until DC'd; all other medication continued (Emgality 120mg/mL once per month, Nurtec ODT 75mg once a day PRN, Lasmiditan 200mg PRN for severe headaches).    Today, Pt states she has not been getting Emgality filled since February, did not notify provider. HA been more frequent and more intense since last visit. Has migraine today, Ubrelvy effective. States her headaches average 16 migraines/month. Takes lasmitidan for severe migraines which occur once every other week and they do not respond to Ubrelvy. Dx w/HERB, utilizes CPAP. Not monitoring type of food intake as possibility of migraine. Denies photophobia/phonophobia/nausea.     Age of Onset - childhood    Semiology - left temporal, pounding, 10/10, lasting up to 48 hrs, with nausea, photophobia, phonophobia. Associated with Left eye vision peripheral aura.     Frequency - 16 migraine headache days/month    Exacerbating Factors - denied    Risk Factors -  - Family history of headache disorder? sister and daughter with migraine.  - History of Head Trauma? denied  - History of CNS infection? denied  - History of underlying mood disorder? denied  - Illicit drug use? denied  - Tobacco use? denied  - History of sleep disorder? HERB on CPAP. Still staying up the whole night for this past 1 month. Having trouble falling asleep. Stays asleep for 5-6 hours when she falls asleep.     Workup -  - MRI Brain +/- Yassine 11/24/2015 - I have reviewed the study independently and with the patient.  Unremarkable  - MRI Brain w/o Yassine 3/1/2018 - I have reviewed the study independently and with the patient. Unremarkable.  - MRI Face/Orbit +/- Yassine 6/5/2018 - I have reviewed the study independently and with the patient. Unremarkable  - MRI Pituitary +/- Yassine 11/13/2019 - I have reviewed the study independently and with the patient. Unremarkable  - Lumbar Puncture:  - MRA Head:  - MRV Head:    Current ppx meds -  TPM IR 100mg BID -  Emgality 120mg/mL once per month (5/7/2021 - present) - effective  Effexor 37.5mg daily (8/4/2021 - present) - started by mental health provider    Current abortive meds -  Tylenol PRN - ineffective. Takes it daily for the six months.  Lasmiditan 200mg PRN for severe headaches (5/7/2021 - present) - partially effective    Prior ppx meds -  Amitriptyline 50mg qhs    Prior abortive meds -   Naproxen 500mg BID PRN  Nurtec ODT 75mg once a day PRN (5/7/2021 - 2/8/2022) - ineffective    Review of Systems    Constitutional: no fever, fatigue, weakness  Eye: no vision loss, eye redness, drainage, or pain  ENMT: no sore throat, ear pain, sinus pain/congestion, nasal congestion/drainage  Respiratory: no cough, no wheezing, no shortness of breath  Cardiovascular: no chest pain, no palpitations, no edema  Gastrointestinal: no nausea, vomiting, or diarrhea. No abdominal pain  Genitourinary: no dysuria, no urinary frequency or urgency, no hematuria  Hema/Lymph: no abnormal bruising or bleeding  Endocrine: no heat or cold intolerance, no excessive thirst or excessive urination  Musculoskeletal: no muscle or joint pain, no joint swelling  Integumentary: no skin rash or abnormal lesion  Psychiatric: no depressed mood, no anxiety, no visual/auditory hallucinations, no delusions, no suicidal or homicidal ideation  Neurologic: as per HPI    Objective:      Physical Exam    Comprehensive Neurological Exam:  Mental Status: AAOx4 Naming, repetition, reading, and writing wnl. Comprehension wnl. No dysarthria.    CN II - XII: QUINN, No APD, VA grossly intact to finger counting at 6 ft, VFFC, No ptosis OU, EOMI without nystagmus, LT/Temp symmetric in CN V1-3 distribution, Hearing grossly intact, Face Symmetric, Tongue and Uvula midline, Trapezius symmetric bilateral.   Motor: tone and bulk wnl throughout, no abnormal involuntary or voluntary movements, 5/5 to confrontation, Fine finger movements wnl b/l, No pronator drift.   Sensory: LT, Proprioception, Vibration, PP, Temp symmetric. No sensory simultagnosia.   Reflexes: 2+ throughout, plantar reflexes downward bilateral.   Cerebellar: FNF wnl b/l, RAHM wnl b/l, HTS wnl.  Romberg: Negative  Gait: normal, bilat arm swing intact  Assessment:       1. Chronic migraine with aura      Plan:       This is a 36 year old female with PMHX of PEs, HERB, TMJ issues, HTN, GERD, Fibromyalgia, Grave's Disease, Sickle Cell Trait, TIA, who was referred for medically intractable chronic migraine with visual aura with status migrainosus not responding to Topamax or Elavil. Recent increase in migraines to 16 on average per month. Denies due to sleep as she utilizes CPAP. Not monitoring food intake, no HA diary. Discussed possible Botox, not interested at this time. Ubrelvy effective for migraine, lasmitidan used for severe migraines. Has been out of Emgality since February. Will re-order    [] c/w Ubrelvy 100mg PO PRN migraine - triptans are contraindicated in pts with hx of HTN and TIA  [] re-order Emgality 120mg/mL once per month  [] c/w Lasmiditan 200mg PRN for severe headaches  [] c/w Topamax 125mg BID  [] consider Botox in future  [] handout on keeping Ha diary provided    RTC 3 MONTHS    Headache education provided - including good sleep hygiene, stress management, medication overuse education provided - using more 3 OTC per week may worsen headaches, High intensity interval training has shown to reduce headache frequency. Low carb, high protein has shown to reduce headache frequency.  Patient is instructed to keep headache diary.    I have explained the treatment plan, diagnosis, and prognosis to the patient, caretaker, family. All questions are answered to the best of my knowledge.    Face to Face Time 30 minute, including, counseling, education, review of test results, relevant medical records, and coordination of care.

## 2022-08-08 ENCOUNTER — OFFICE VISIT (OUTPATIENT)
Dept: ORTHOPEDICS | Facility: CLINIC | Age: 37
End: 2022-08-08
Payer: MEDICAID

## 2022-08-08 VITALS — WEIGHT: 217 LBS | BODY MASS INDEX: 38.45 KG/M2 | HEIGHT: 63 IN

## 2022-08-08 DIAGNOSIS — G56.01 RIGHT CARPAL TUNNEL SYNDROME: ICD-10-CM

## 2022-08-08 DIAGNOSIS — G56.21 CUBITAL TUNNEL SYNDROME ON RIGHT: ICD-10-CM

## 2022-08-08 DIAGNOSIS — G56.00 CARPAL TUNNEL SYNDROME, UNSPECIFIED LATERALITY: Primary | ICD-10-CM

## 2022-08-08 PROCEDURE — 3008F PR BODY MASS INDEX (BMI) DOCUMENTED: ICD-10-PCS | Mod: CPTII,,, | Performed by: ORTHOPAEDIC SURGERY

## 2022-08-08 PROCEDURE — 99024 PR POST-OP FOLLOW-UP VISIT: ICD-10-PCS | Mod: ,,, | Performed by: ORTHOPAEDIC SURGERY

## 2022-08-08 PROCEDURE — 1159F PR MEDICATION LIST DOCUMENTED IN MEDICAL RECORD: ICD-10-PCS | Mod: CPTII,,, | Performed by: ORTHOPAEDIC SURGERY

## 2022-08-08 PROCEDURE — 1159F MED LIST DOCD IN RCRD: CPT | Mod: CPTII,,, | Performed by: ORTHOPAEDIC SURGERY

## 2022-08-08 PROCEDURE — 99214 OFFICE O/P EST MOD 30 MIN: CPT | Mod: PBBFAC

## 2022-08-08 PROCEDURE — 3008F BODY MASS INDEX DOCD: CPT | Mod: CPTII,,, | Performed by: ORTHOPAEDIC SURGERY

## 2022-08-08 PROCEDURE — 99024 POSTOP FOLLOW-UP VISIT: CPT | Mod: ,,, | Performed by: ORTHOPAEDIC SURGERY

## 2022-08-08 NOTE — PROGRESS NOTES
"[facility]  Established Patient Office Visit  08/08/2022       Patient ID: Cindy Garcia  YOB: 1985  MRN: [id number]    Diagnosis:    The primary encounter diagnosis was Carpal tunnel syndrome, unspecified laterality. Diagnoses of Right carpal tunnel syndrome and Cubital tunnel syndrome on right were also pertinent to this visit.     Procedure:     Release, Carpal Tunnel - Left and Release, Cubital Tunnel - Left on 5/19/2022    Chief Complaint: Pain of the Left Wrist and Follow-up      Cindy Garcia is a 36 y.o. female who presents for follow up treatment of the above mentioned diagnosis. The patient's last visit with me was on 6/8/2022.    36F, LHD, with PMH L carpal/cubital syndrome s/p releases 5/19/2022, symptoms much improved.  Initially had significant post-op pain and swelling, though this has since resolved.  Feels like her scar is on her elbow is tight and "feels like it's going to pop open".  Uses ice sometimes.  Interested in getting her right side done.  Has the same symptoms, her whole hand falls asleep especially at night.    Past Medical History:    Past Medical History:   Diagnosis Date    Carpal tunnel syndrome     Cluster headaches     Cubital tunnel syndrome     Diabetes mellitus     Digestive disorder     Diverticulosis     Dysphagia     Fibromyalgia     Fluid retention     GERD (gastroesophageal reflux disease)     Graves disease     Graves' disease     Hypertension     Meniere disease     Neuropathy     PE (pulmonary thromboembolism)     Sciatic nerve pain     Sleep apnea     TMJ (dislocation of temporomandibular joint)      Past Surgical History:   Procedure Laterality Date    ADENOIDECTOMY N/A     CARPAL TUNNEL RELEASE Left 5/19/2022    Procedure: RELEASE, CARPAL TUNNEL;  Surgeon: Eben Bolanos MD;  Location: [facility];  Service: Orthopedics;  Laterality: Left;    CHOLECYSTECTOMY N/A     laryngoscopy and " other tracheoscopy N/A 12/23/2014    ULNAR TUNNEL RELEASE Left 5/19/2022    Procedure: RELEASE, CUBITAL TUNNEL;  Surgeon: Eben Bolaons MD;  Location: [facility];  Service: Orthopedics;  Laterality: Left;     Family History   Problem Relation Age of Onset    Diabetes Mother     COPD Mother     Hypertension Mother     Arthritis Mother     Kidney disease Mother     Vision loss Mother     Asthma Daughter     Cancer Paternal Aunt     Stroke Paternal Aunt      Social History     Socioeconomic History    Marital status:    Tobacco Use    Smoking status: Never Smoker    Smokeless tobacco: Never Used   Substance and Sexual Activity    Alcohol use: Not Currently    Drug use: Never    Sexual activity: Yes     Medication List with Changes/Refills   Current Medications    AMITRIPTYLINE (ELAVIL) 50 MG TABLET    ONE TABLET (BY MOUTH) AT BEDTIME    APIXABAN (ELIQUIS) 5 MG TAB    Take 5 mg by mouth once daily.    BACLOFEN (LIORESAL) 10 MG TABLET    Take 1 tablet (10 mg total) by mouth 3 (three) times daily.    DILTIAZEM (CARDIZEM CD) 120 MG CP24    Take 120 mg by mouth once daily.    ERGOCALCIFEROL (ERGOCALCIFEROL) 50,000 UNIT CAP    Take 50,000 Int'l Units by mouth every 7 days.    FLUTICASONE PROPIONATE (FLONASE) 50 MCG/ACTUATION NASAL SPRAY    once daily.    FUROSEMIDE (LASIX) 20 MG TABLET    Take 20 mg by mouth once daily.    GABAPENTIN (NEURONTIN) 800 MG TABLET    ONE TABLET (BY MOUTH) THREE TIMES A DAY    GALCANEZUMAB-GNLM (EMGALITY PEN) 120 MG/ML PNIJ    Inject 120 mg into the skin every 28 days.    IPRATROPIUM (ATROVENT) 42 MCG (0.06 %) NASAL SPRAY    by Nasal route 2 (two) times a day.    LASMIDITAN (REYVOW) TABLET 100 MG    TAKE 2 TABLETS (BY MOUTH) EVERY DAY AS NEEDED FOR SEVERE HEADACHES ONLY. ALLOW AT LEAST 24 HOURS BETWEEN DOSES.    METFORMIN (GLUCOPHAGE-XR) 500 MG ER 24HR TABLET    TAKE 2 TABLETS (BY MOUTH) EVERY MORNING    NURTEC 75 MG ODT    Take 75 mg by mouth daily as needed.     PANTOPRAZOLE (PROTONIX) 40 MG TABLET    Take 40 mg by mouth once daily.    TOPIRAMATE (TOPAMAX) 100 MG TABLET    Take 1 tablet (100 mg total) by mouth 2 (two) times daily.    TOPIRAMATE 25 MG CAPSULE    Take 1 capsule (25 mg total) by mouth 2 (two) times daily.    TRAMADOL (ULTRAM) 50 MG TABLET    Take 1 tablet (50 mg total) by mouth every 6 (six) hours.    VENLAFAXINE (EFFEXOR) 37.5 MG TAB    Take 37.5 mg by mouth once daily.     Review of patient's allergies indicates:  No Known Allergies    ROS:    Body mass index is 38.44 kg/m².  GENERAL: Well appearing, appropriate for stated age, no acute distress.  CARDIOVASCULAR: Pulses regular by peripheral palpation.  PULMONARY: Respirations are even and non-labored.  NEURO: Awake, alert, and oriented x 3.  PSYCH: Mood & affect are appropriate.  HEENT: Head is normocephalic and atraumatic.    Physical Exam:    LUE:  Well healed incisions over cubital tunnel and carpal tunnel, no dehiscence or drainage  Cubital tunnel incision with mild keloiding, mildly hypersensitive  FROM wrist and elbow without pain  Improved sensation M/U, normal sensation R  M/R/U motor intact  WWP    LUE:  No abrasions or open wounds  + Tinels over carpal and cubital tunnels  FROM wrist and elbow without pain  Decreased sensation M/U, normal sensation R  M/R/U motor intact  WWP      Imaging:    Relevant imaging results reviewed and interpreted by me, discussed with the patient and / or family today. None    Assessment and Plan:    Cindy Garcia is a 36 y.o. female seen in the office today for The primary encounter diagnosis was Carpal tunnel syndrome, unspecified laterality. Diagnoses of Right carpal tunnel syndrome and Cubital tunnel syndrome on right were also pertinent to this visit..     36F, LHD, with PMH L carpal/cubital syndrome s/p releases 5/19/2022, symptoms much improved.     - Referral placed for RUE EMG in [place], suspected R carpal tunnel and cubital tunnel syndrome (had  only EMG of LUE)  - Follow up 2 months, sooner if EMG done sooner      Orders Placed This Encounter    Ambulatory referral/consult to Neurology      January Yarbrough MD, PGY-5  [facility]

## 2022-08-09 NOTE — PROGRESS NOTES
ATTENDING ADDENDUM    Cindy Garcia  was evaluated at the time of the encounter with Dr Yarbrough PGY5.  HPI, PE and treatment plan was reviewed. Treatment plan was reasonable and necessary. Imaging was reviewed at the time of visit.

## 2022-08-23 ENCOUNTER — OFFICE VISIT (OUTPATIENT)
Dept: INTERNAL MEDICINE | Facility: CLINIC | Age: 37
End: 2022-08-23
Payer: MEDICAID

## 2022-08-23 DIAGNOSIS — E05.90 SUBCLINICAL HYPERTHYROIDISM: ICD-10-CM

## 2022-08-23 DIAGNOSIS — R79.82 ELEVATED C-REACTIVE PROTEIN (CRP): Primary | ICD-10-CM

## 2022-08-23 DIAGNOSIS — E87.6 HYPOKALEMIA: ICD-10-CM

## 2022-08-23 DIAGNOSIS — Z00.00 WELL ADULT EXAM: ICD-10-CM

## 2022-08-23 DIAGNOSIS — E55.9 VITAMIN D INSUFFICIENCY: ICD-10-CM

## 2022-08-23 DIAGNOSIS — D50.8 IRON DEFICIENCY ANEMIA SECONDARY TO INADEQUATE DIETARY IRON INTAKE: ICD-10-CM

## 2022-08-23 PROCEDURE — 99214 OFFICE O/P EST MOD 30 MIN: CPT | Mod: 95,,,

## 2022-08-23 PROCEDURE — 1160F RVW MEDS BY RX/DR IN RCRD: CPT | Mod: CPTII,95,,

## 2022-08-23 PROCEDURE — 99214 PR OFFICE/OUTPT VISIT, EST, LEVL IV, 30-39 MIN: ICD-10-PCS | Mod: 95,,,

## 2022-08-23 PROCEDURE — 1159F PR MEDICATION LIST DOCUMENTED IN MEDICAL RECORD: ICD-10-PCS | Mod: CPTII,95,,

## 2022-08-23 PROCEDURE — 1159F MED LIST DOCD IN RCRD: CPT | Mod: CPTII,95,,

## 2022-08-23 PROCEDURE — 1160F PR REVIEW ALL MEDS BY PRESCRIBER/CLIN PHARMACIST DOCUMENTED: ICD-10-PCS | Mod: CPTII,95,,

## 2022-08-23 RX ORDER — POTASSIUM CHLORIDE 750 MG/1
10 TABLET, EXTENDED RELEASE ORAL 2 TIMES DAILY
Qty: 180 TABLET | Refills: 1 | Status: SHIPPED | OUTPATIENT
Start: 2022-08-23 | End: 2022-11-08 | Stop reason: SDUPTHER

## 2022-08-23 RX ORDER — FERROUS SULFATE 325(65) MG
325 TABLET, DELAYED RELEASE (ENTERIC COATED) ORAL DAILY
Qty: 90 TABLET | Refills: 2 | Status: SHIPPED | OUTPATIENT
Start: 2022-08-23 | End: 2022-11-08 | Stop reason: SDUPTHER

## 2022-08-23 RX ORDER — ERGOCALCIFEROL 1.25 MG/1
50000 CAPSULE ORAL
Qty: 24 CAPSULE | Refills: 1 | Status: SHIPPED | OUTPATIENT
Start: 2022-08-25 | End: 2022-12-22 | Stop reason: SDUPTHER

## 2022-08-23 NOTE — ASSESSMENT & PLAN NOTE
Lab Results   Component Value Date    TSH 0.3141 (L) 08/08/2022     States she will call to schedule an appt with Dr. Ramos.  Denies palpitations or increased appetite.  Last documented HR-118.

## 2022-08-23 NOTE — ASSESSMENT & PLAN NOTE
Lab Results   Component Value Date    HCT 39.7 08/08/2022    HGB 12.8 08/08/2022    FERRITIN 66.13 08/08/2022    TIBC 276 08/08/2022    LABIRON 15 (L) 08/08/2022   Rx ferrous sulfate.  Take iron supplements as prescribed.  Add Vitamin C to your diet.  Take iron and vitamin C on an empty stomach for better absorption if tolerated.  Add iron rich foods to diet such as liver, lean beef, eggs, dried fruit, dark green leafy vegetables.  Education provided on additional sources of potassium-rich foods.  Do NOT drink milk or take antacids at the same time you take your iron supplement.  Iron supplements can cause constipation; add stool softener or fiber as needed.

## 2022-08-23 NOTE — PROGRESS NOTES
PATIENT NAME: Cindy Garcia  : 1985  DATE: 22  MRN: [id number]          Reason for Visit/Chief Complaint   Follow-up and Labs Only       History of Present Illness (HPI)   Audio Only Telehealth Visit     The patient location is: Home  The chief complaint leading to consultation is: Lab review  Visit type: Virtual visit with audio only (telephone)  Total time spent with patient: 20 minutes     The reason for the audio only service rather than synchronous audio and video virtual visit was related to technical difficulties or patient preference/necessity.     Each patient to whom I provide medical services by telemedicine is:  (1) informed of the relationship between the physician and patient and the respective role of any other health care provider with respect to management of the patient; and (2) notified that they may decline to receive medical services by telemedicine and may withdraw from such care at any time. Patient verbally consented to receive this service via voice-only telephone call.       Cindy Garcia is a 36 y.o. Black female presenting in clinic, via audio only, today to Follow-up and Labs Only.PMH PE, optic neuritis, peripheral neuropathy, HERB, Meniere's, TMJ, cluster headaches, GERD, HTN, diverticulosis, DDD w/ herniated disc, Grave's disease, sickle cell trait, TIA, and fibromyalgia. She is followed by [facility] neuro clinic for headaches, Dr. Ramsay (ENT) in [place], Dr. Sharon Casiano (GYN), Dr. Chinmay Benitez (GI), and [facility] in [place] for multiple PE's, palpitations, and SOB. She was previously followed by Dr. Andersen (rheumatology) and a rheumatology clinic in [place], and Dr. Marciano Ramos (endocrinology).      Iron saturation-15, K-3.4, patient states she was previously prescribed iron and potassium supplements, but she stopped taking them. She denies CP, SOB, or fatigue. Lipid panel unremarkable. TSH-0.3141, T4-0.86. She states she will call Dr. Ramos's office  to schedule an appt.  Vitamin D-17.9. She is currently prescribed and taking vitamin d3 weekly, will increase to twice per week.      Denies smoking, drinking, or illicit drug use. Denies chest pain, shortness of breath, cough, headache, dizziness, weakness, abdominal pain, nausea, vomiting, diarrhea, constipation, dysuria, depression, anxiety, SI, and HI.     Cervical Cancer Screening - Next appt is in 9/2022, she will send records.   Breast Cancer Screening - Deferred due to age.  Colon Cancer Screening - Deferred due to age.  Vaccinations: Declines vaccines.        This service was not originating from a related E/M service provided within the previous 7 days nor will  to an E/M service or procedure within the next 24 hours or my soonest available appointment.  Prevailing standard of care was able to be met in this audio-only visit.        Review of Systems     Review of Systems   Constitutional: Negative.    HENT: Negative.    Eyes: Negative.    Respiratory: Negative.    Cardiovascular: Negative.    Gastrointestinal: Negative.    Endocrine: Negative.    Genitourinary: Negative.    Musculoskeletal: Negative.    Allergic/Immunologic: Negative.    Neurological: Negative.    Hematological: Negative.    Psychiatric/Behavioral: Negative.        Medical / Social / Family History     Past Medical History:   Diagnosis Date    Carpal tunnel syndrome     Cluster headaches     Cubital tunnel syndrome     Diabetes mellitus     Digestive disorder     Diverticulosis     Dysphagia     Fibromyalgia     Fluid retention     GERD (gastroesophageal reflux disease)     Graves disease     Graves' disease     Hypertension     Meniere disease     Neuropathy     PE (pulmonary thromboembolism)     Sciatic nerve pain     Sleep apnea     TMJ (dislocation of temporomandibular joint)          Past Surgical History:   Procedure Laterality Date    ADENOIDECTOMY N/A     CARPAL TUNNEL RELEASE Left 5/19/2022     Procedure: RELEASE, CARPAL TUNNEL;  Surgeon: Eben Bolanos MD;  Location: [facility];  Service: Orthopedics;  Laterality: Left;    CHOLECYSTECTOMY N/A     laryngoscopy and other tracheoscopy N/A 12/23/2014    ULNAR TUNNEL RELEASE Left 5/19/2022    Procedure: RELEASE, CUBITAL TUNNEL;  Surgeon: Eben Bolanos MD;  Location: [facility] OR;  Service: Orthopedics;  Laterality: Left;         Social History  Cindy Garcia's  reports that she has never smoked. She has never used smokeless tobacco. She reports previous alcohol use. She reports that she does not use drugs.    Family History  Cindy Garcia's family history includes Arthritis in her mother; Asthma in her daughter; COPD in her mother; Cancer in her paternal aunt; Diabetes in her mother; Hypertension in her mother; Kidney disease in her mother; Stroke in her paternal aunt; Vision loss in her mother.    Medications and Allergies     Medications  Medication List with Changes/Refills   New Medications    FERROUS SULFATE 325 (65 FE) MG EC TABLET    Take 1 tablet (325 mg total) by mouth once daily.    POTASSIUM CHLORIDE SA (K-DUR,KLOR-CON M) 10 MEQ TABLET    Take 1 tablet (10 mEq total) by mouth 2 (two) times daily.   Current Medications    AMITRIPTYLINE (ELAVIL) 50 MG TABLET    ONE TABLET (BY MOUTH) AT BEDTIME    APIXABAN (ELIQUIS) 5 MG TAB    Take 5 mg by mouth once daily.    BACLOFEN (LIORESAL) 10 MG TABLET    Take 1 tablet (10 mg total) by mouth 3 (three) times daily.    DILTIAZEM (CARDIZEM CD) 120 MG CP24    Take 120 mg by mouth once daily.    FLUTICASONE PROPIONATE (FLONASE) 50 MCG/ACTUATION NASAL SPRAY    once daily.    FUROSEMIDE (LASIX) 20 MG TABLET    Take 20 mg by mouth once daily.    GABAPENTIN (NEURONTIN) 800 MG TABLET    ONE TABLET (BY MOUTH) THREE TIMES A DAY    GALCANEZUMAB-GNLM (EMGALITY PEN) 120 MG/ML PNIJ    Inject 120 mg into the skin every 28 days.    IPRATROPIUM (ATROVENT) 42 MCG (0.06 %) NASAL SPRAY    by Nasal route 2 (two)  times a day.    LASMIDITAN (REYVOW) TABLET 100 MG    TAKE 2 TABLETS (BY MOUTH) EVERY DAY AS NEEDED FOR SEVERE HEADACHES ONLY. ALLOW AT LEAST 24 HOURS BETWEEN DOSES.    METFORMIN (GLUCOPHAGE-XR) 500 MG ER 24HR TABLET    TAKE 2 TABLETS (BY MOUTH) EVERY MORNING    NURTEC 75 MG ODT    Take 75 mg by mouth daily as needed.    PANTOPRAZOLE (PROTONIX) 40 MG TABLET    Take 40 mg by mouth once daily.    TOPIRAMATE (TOPAMAX) 100 MG TABLET    Take 1 tablet (100 mg total) by mouth 2 (two) times daily.    TOPIRAMATE 25 MG CAPSULE    Take 1 capsule (25 mg total) by mouth 2 (two) times daily.    TRAMADOL (ULTRAM) 50 MG TABLET    Take 1 tablet (50 mg total) by mouth every 6 (six) hours.    VENLAFAXINE (EFFEXOR) 37.5 MG TAB    Take 37.5 mg by mouth once daily.   Changed and/or Refilled Medications    Modified Medication Previous Medication    ERGOCALCIFEROL (ERGOCALCIFEROL) 50,000 UNIT CAP ergocalciferol (ERGOCALCIFEROL) 50,000 unit Cap       Take 1 capsule (50,000 Units total) by mouth twice a week.    Take 50,000 Int'l Units by mouth every 7 days.         Allergies  Review of patient's allergies indicates:  No Known Allergies    Physical Examination   There were no vitals filed for this visit.  Physical Exam  Pulmonary:      Effort: Pulmonary effort is normal.   Neurological:      General: No focal deficit present.      Mental Status: She is alert and oriented to person, place, and time.   Psychiatric:         Mood and Affect: Mood normal.         Behavior: Behavior normal.         Thought Content: Thought content normal.         Judgment: Judgment normal.           Results     Lab Results   Component Value Date    WBC 6.0 08/08/2022    RBC 4.44 08/08/2022    HGB 12.8 08/08/2022    HCT 39.7 08/08/2022    MCV 89.4 08/08/2022    MCH 28.8 08/08/2022    MCHC 32.2 (L) 08/08/2022    RDW 13.3 08/08/2022     08/08/2022    MPV 12.0 (H) 08/08/2022     Lab Results   Component Value Date     08/08/2022    K 3.4 (L) 08/08/2022     CO2 25 08/08/2022    BUN 4.5 (L) 08/08/2022    CREATININE 1.05 (H) 08/08/2022    CALCIUM 9.8 08/08/2022    ALBUMIN 4.0 08/08/2022    BILITOT 0.6 08/08/2022    ALKPHOS 95 08/08/2022    AST 12 08/08/2022    ALT 15 08/08/2022    EGFRIFAFRICA >60 05/18/2022    EGFRNONAA 56 (L) 10/19/2021     Lab Results   Component Value Date    TSH 0.3141 (L) 08/08/2022     Lab Results   Component Value Date    CHOL 159 08/08/2022    HDL 38 08/08/2022    LDL 96.00 08/08/2022    TRIG 125 08/08/2022     Lab Results   Component Value Date    COLORU LIGHT YELLOW 07/09/2020    APPEARANCEUA Clear 07/09/2020    PHUR 6.5 07/09/2020    GLUCUA Negative 07/09/2020    KETONESU Negative 07/09/2020    OCCULTUA Negative 07/09/2020    NITRITE Negative 07/09/2020    LEUKOCYTESUR Negative 07/09/2020    RBCUA 3-5 (A) 07/09/2020    WBCUA 0-2 07/09/2020    BACTERIA Rare 07/09/2020    HYALINECASTS 0-2 (A) 07/09/2020     Lab Results   Component Value Date    CREATRANDUR 160.0 07/09/2020     Lab Results   Component Value Date    ROLEOCBL45SK 17.9 (L) 08/08/2022    FOLATE 7.3 08/08/2022     Lab Results   Component Value Date    HIV Nonreactive 08/08/2022    HEPAIGM Nonreactive 08/08/2022    HEPBCOREM Nonreactive 08/08/2022    HEPCAB Nonreactive 08/08/2022     No results found for: FITDIAG, COLOGUARD  No results found for: OCCBLDIA        Assessment and Plan (including Health Maintenance)     Health Maintenance Due   Topic Date Due    Cervical Cancer Screening  Never done    COVID-19 Vaccine (1) Never done    TETANUS VACCINE  06/24/2018       Problem List Items Addressed This Visit        Renal/    Hypokalemia    Current Assessment & Plan     Lab Results   Component Value Date    K 3.4 (L) 08/08/2022   Rx potassium chloride.  Take meds as prescribed.   Incorporated potassium rich foods into you diet such as nuts, seeds, peas, beans (dried), whole grains, fresh fruits and vegetables, lean red meat and yogurt.   Education provided on additional sources of  potassium-rich foods including: carrots, broccoli, potatoes, celery, spinach, squash, tomatoes, avocados, apricots, cantaloupe, bananas, nectarines, prunes, figs and raisins.   Seek health care assistance if you experience chest pain, shortness of breath, vomiting or diarrhea lasting more than 2 days, fainting, palpitations, or weakness worsens.              Relevant Medications    potassium chloride SA (K-DUR,KLOR-CON M) 10 MEQ tablet    Other Relevant Orders    Comprehensive Metabolic Panel       Oncology    Iron deficiency anemia secondary to inadequate dietary iron intake    Current Assessment & Plan     Lab Results   Component Value Date    HCT 39.7 08/08/2022    HGB 12.8 08/08/2022    FERRITIN 66.13 08/08/2022    TIBC 276 08/08/2022    LABIRON 15 (L) 08/08/2022   Rx ferrous sulfate.  Take iron supplements as prescribed.  Add Vitamin C to your diet.  Take iron and vitamin C on an empty stomach for better absorption if tolerated.  Add iron rich foods to diet such as liver, lean beef, eggs, dried fruit, dark green leafy vegetables.  Education provided on additional sources of potassium-rich foods.  Do NOT drink milk or take antacids at the same time you take your iron supplement.  Iron supplements can cause constipation; add stool softener or fiber as needed.             Relevant Medications    ferrous sulfate 325 (65 FE) MG EC tablet    Other Relevant Orders    Iron and TIBC       Endocrine    BMI 38.0-38.9,adult    Current Assessment & Plan     There is no height or weight on file to calculate BMI.  Goal BMI <30.  Aerobic exercise 150 minutes per week.  Avoid soda, simple sugars, sweets, excessive rice, pasta, potatoes or bread.   Choose brown options when available and portion control.  Limit fast foods and fried foods.   Choose complex carbs in moderation (ex: green, leafy vegetables, beans, oatmeal).  Eat plenty of fresh fruits and vegetables with lean meats daily.   Consider permanent healthy lifestyle  changes.             Subclinical hyperthyroidism    Current Assessment & Plan     Lab Results   Component Value Date    TSH 0.3141 (L) 08/08/2022     States she will call to schedule an appt with Dr. Ramos.  Denies palpitations or increased appetite.  Last documented HR-118.           Relevant Orders    TSH    T4, Free    Vitamin D insufficiency    Current Assessment & Plan     Lab Results   Component Value Date    [id number] 17.9 (L) 08/08/2022     Educated on increasing foods high in Vitamin D such as fish oil, cod liver oil, salmon, milk fortified with vitamin D.  RX Vitamin D3 26783 IU twice weekly x 12 weeks.  Complete entire 12 weeks of Vitamin D prescription.  After completion of prescription (12 weeks/3 months), begin taking Vitamin D 2000 I.U. tablets daily (purchase over the counter).  Repeat Vitamin D level as ordered.             Relevant Medications    ergocalciferol (ERGOCALCIFEROL) 50,000 unit Cap (Start on 8/25/2022)    Other Relevant Orders    Vitamin D       Other    Elevated C-reactive protein (CRP) - Primary    Current Assessment & Plan     Referral to Rhematology-[facility] in [place], [place].           Relevant Orders    Ambulatory referral/consult to Rheumatology      Other Visit Diagnoses     Well adult exam        Relevant Orders    CBC Auto Differential    Urinalysis, Reflex to Urine Culture Urine, Clean Catch    Microalbumin/Creatinine Ratio, Urine          Health Maintenance Topics with due status: Not Due       Topic Last Completion Date    Influenza Vaccine Not Due       Future Appointments   Date Time Provider Department Center   10/10/2022  8:30 AM RESIDENT 1, [facility] ORTHOPEDICS [facility] ORTHO [place]    10/18/2022  2:00 PM Abdiaziz Dixon MD [facility] RHEUM [facility]   11/8/2022  9:00 AM CHRISTIE Ashton [facility] NEURO [place]    11/23/2022  8:30 AM JAIMIE Sutton [facility] INTMED [place]            Signature:        JAIMIE Sutton  [facility] INTERNAL MEDICINE  [address] [address]    Date of encounter: 8/23/22

## 2022-08-23 NOTE — ASSESSMENT & PLAN NOTE
There is no height or weight on file to calculate BMI.  Goal BMI <30.  Aerobic exercise 150 minutes per week.  Avoid soda, simple sugars, sweets, excessive rice, pasta, potatoes or bread.   Choose brown options when available and portion control.  Limit fast foods and fried foods.   Choose complex carbs in moderation (ex: green, leafy vegetables, beans, oatmeal).  Eat plenty of fresh fruits and vegetables with lean meats daily.   Consider permanent healthy lifestyle changes.

## 2022-08-23 NOTE — ASSESSMENT & PLAN NOTE
Lab Results   Component Value Date    K 3.4 (L) 08/08/2022   Rx potassium chloride.  Take meds as prescribed.   Incorporated potassium rich foods into you diet such as nuts, seeds, peas, beans (dried), whole grains, fresh fruits and vegetables, lean red meat and yogurt.   Education provided on additional sources of potassium-rich foods including: carrots, broccoli, potatoes, celery, spinach, squash, tomatoes, avocados, apricots, cantaloupe, bananas, nectarines, prunes, figs and raisins.   Seek health care assistance if you experience chest pain, shortness of breath, vomiting or diarrhea lasting more than 2 days, fainting, palpitations, or weakness worsens.

## 2022-08-23 NOTE — ASSESSMENT & PLAN NOTE
Lab Results   Component Value Date    [id number] 17.9 (L) 08/08/2022     Educated on increasing foods high in Vitamin D such as fish oil, cod liver oil, salmon, milk fortified with vitamin D.  RX Vitamin D3 26909 IU twice weekly x 12 weeks.  Complete entire 12 weeks of Vitamin D prescription.  After completion of prescription (12 weeks/3 months), begin taking Vitamin D 2000 I.U. tablets daily (purchase over the counter).  Repeat Vitamin D level as ordered.

## 2022-09-20 DIAGNOSIS — R52 GENERALIZED PAIN: ICD-10-CM

## 2022-09-20 RX ORDER — BACLOFEN 10 MG/1
10 TABLET ORAL 3 TIMES DAILY
Qty: 90 TABLET | Refills: 1 | Status: SHIPPED | OUTPATIENT
Start: 2022-09-20 | End: 2023-03-27

## 2022-10-18 ENCOUNTER — OFFICE VISIT (OUTPATIENT)
Dept: RHEUMATOLOGY | Facility: CLINIC | Age: 37
End: 2022-10-18
Payer: MEDICAID

## 2022-10-18 VITALS
WEIGHT: 218.06 LBS | BODY MASS INDEX: 38.64 KG/M2 | HEART RATE: 91 BPM | HEIGHT: 63 IN | DIASTOLIC BLOOD PRESSURE: 75 MMHG | SYSTOLIC BLOOD PRESSURE: 116 MMHG

## 2022-10-18 DIAGNOSIS — R52 GENERALIZED PAIN: ICD-10-CM

## 2022-10-18 DIAGNOSIS — R79.82 ELEVATED C-REACTIVE PROTEIN (CRP): Primary | ICD-10-CM

## 2022-10-18 PROCEDURE — 99204 PR OFFICE/OUTPT VISIT, NEW, LEVL IV, 45-59 MIN: ICD-10-PCS | Mod: S$PBB,,, | Performed by: STUDENT IN AN ORGANIZED HEALTH CARE EDUCATION/TRAINING PROGRAM

## 2022-10-18 PROCEDURE — 3078F DIAST BP <80 MM HG: CPT | Mod: CPTII,,, | Performed by: STUDENT IN AN ORGANIZED HEALTH CARE EDUCATION/TRAINING PROGRAM

## 2022-10-18 PROCEDURE — 3078F PR MOST RECENT DIASTOLIC BLOOD PRESSURE < 80 MM HG: ICD-10-PCS | Mod: CPTII,,, | Performed by: STUDENT IN AN ORGANIZED HEALTH CARE EDUCATION/TRAINING PROGRAM

## 2022-10-18 PROCEDURE — 1160F PR REVIEW ALL MEDS BY PRESCRIBER/CLIN PHARMACIST DOCUMENTED: ICD-10-PCS | Mod: CPTII,,, | Performed by: STUDENT IN AN ORGANIZED HEALTH CARE EDUCATION/TRAINING PROGRAM

## 2022-10-18 PROCEDURE — 99999 PR PBB SHADOW E&M-EST. PATIENT-LVL V: CPT | Mod: PBBFAC,,, | Performed by: STUDENT IN AN ORGANIZED HEALTH CARE EDUCATION/TRAINING PROGRAM

## 2022-10-18 PROCEDURE — 99999 PR PBB SHADOW E&M-EST. PATIENT-LVL V: ICD-10-PCS | Mod: PBBFAC,,, | Performed by: STUDENT IN AN ORGANIZED HEALTH CARE EDUCATION/TRAINING PROGRAM

## 2022-10-18 PROCEDURE — 99204 OFFICE O/P NEW MOD 45 MIN: CPT | Mod: S$PBB,,, | Performed by: STUDENT IN AN ORGANIZED HEALTH CARE EDUCATION/TRAINING PROGRAM

## 2022-10-18 PROCEDURE — 3074F SYST BP LT 130 MM HG: CPT | Mod: CPTII,,, | Performed by: STUDENT IN AN ORGANIZED HEALTH CARE EDUCATION/TRAINING PROGRAM

## 2022-10-18 PROCEDURE — 1159F PR MEDICATION LIST DOCUMENTED IN MEDICAL RECORD: ICD-10-PCS | Mod: CPTII,,, | Performed by: STUDENT IN AN ORGANIZED HEALTH CARE EDUCATION/TRAINING PROGRAM

## 2022-10-18 PROCEDURE — 99215 OFFICE O/P EST HI 40 MIN: CPT | Mod: PBBFAC | Performed by: STUDENT IN AN ORGANIZED HEALTH CARE EDUCATION/TRAINING PROGRAM

## 2022-10-18 PROCEDURE — 1160F RVW MEDS BY RX/DR IN RCRD: CPT | Mod: CPTII,,, | Performed by: STUDENT IN AN ORGANIZED HEALTH CARE EDUCATION/TRAINING PROGRAM

## 2022-10-18 PROCEDURE — 1159F MED LIST DOCD IN RCRD: CPT | Mod: CPTII,,, | Performed by: STUDENT IN AN ORGANIZED HEALTH CARE EDUCATION/TRAINING PROGRAM

## 2022-10-18 PROCEDURE — 3074F PR MOST RECENT SYSTOLIC BLOOD PRESSURE < 130 MM HG: ICD-10-PCS | Mod: CPTII,,, | Performed by: STUDENT IN AN ORGANIZED HEALTH CARE EDUCATION/TRAINING PROGRAM

## 2022-10-18 RX ORDER — ALBUTEROL SULFATE 90 UG/1
AEROSOL, METERED RESPIRATORY (INHALATION)
COMMUNITY
Start: 2022-09-01 | End: 2023-03-27 | Stop reason: SDUPTHER

## 2022-10-18 RX ORDER — ONDANSETRON HYDROCHLORIDE 8 MG/1
8 TABLET, FILM COATED ORAL EVERY 8 HOURS PRN
COMMUNITY
Start: 2022-09-01 | End: 2023-03-27 | Stop reason: SDUPTHER

## 2022-10-18 RX ORDER — DICLOFENAC SODIUM 10 MG/G
GEL TOPICAL
COMMUNITY
Start: 2022-09-01 | End: 2023-03-27

## 2022-10-18 NOTE — PATIENT INSTRUCTIONS
Stress    Stress is a normal psychological and physical reaction to the ever-increasing demands of life; however,  there are both positive and negative stressors. Learning to identify and manage your stress can help you  to cope with life's daily challenges.  The Three Steps of Stress Management  1. Determine the current stressors within your life  2. Identify ways to reduce the stressors  3. Make changes and prioritize your life accordingly  Identify Your Stressors  The most important phase of stress management is identifying your personal triggers. Take time to write  down and/or discuss your various life stressors with someone.  Stress Reducers  The following is a list of proven stress reducers; pick a few to decide which works best for you.   Sleep: Getting 7-9 hours of sleep each night is crucial in maintaining your body's overall health  and wellness.   Learn to say no: You don't have to say yes to everything. If it doesn't help you achieve your  goals, it really is all right to say no.   Exercise: This not only releases chemicals that help you to feel happy, but it helps to break down  the chemicals that raise your stress levels. A regular exercise schedule can help regulate  naturally occurring hormones.   Laughing: This has been shown to release chemicals and reduce stress hormones in your brain.  Read a good comic or watch a funny movie or video clip.   Journal: Keeping a gratitude journal or journaling about life has been shown to help boost  optimism and can help to take burdens from your shoulders.   Do something you enjoy: Take 5 minutes for me time. This allows for a few minutes to  unwind. Try taking up a new hobby, playing music, or painting nails.   Spend time with others: Although it may seem daunting at first, getting out and socializing can  help reduce risk factors for stress.   Other: Do anything else that is relaxing for you personally.  Make Changes  In order to manage stress, you need to  "make changes to cope with life's challenges. Create and stick to a  schedule. To start, prioritize those things of most importance and then add in stress reducers. Eliminate  unnecessary stressors. Finally, make appropriate lifestyle changes for healthier living.    Apps to Consider:          There are now multiple digital applications to help with meditation and management of stress that have been clinically proven to be beneficial.  Headspace and Calm are some of the more popular apps, but there are many available to try.     SLEEP    Insomnia is defined as difficulty falling asleep, difficulty staying asleep, or waking up early in the morning and not being able to return to sleep. In general, people with insomnia sleep less or sleep poorly despite having an adequate chance to sleep. The poor sleep causes difficulty functioning during the daytime. Insomnia is not defined by the number of hours slept because the amount of sleep needed varies from one person to another. Insomnia itself needs to be specifically addressed. Behavioral therapy is often recommended as the initial treatment for insomnia.   Sleep hygiene education -- Sleep hygiene teaches good sleeping habits. This includes:  ?Sleep only as much as necessary to feel rested and then get out of bed.  ?Maintain a regular sleep schedule (the same bedtime and wake time every day).  ?Do not force sleep.  ?Avoid caffeinated beverages after lunch. Avoid alcohol near bedtime.  ?Do not smoke (particularly during the evening).  ?Do not go to bed hungry.  ?Adjust the bedroom environment (light, noise, temperature) so that you are comfortable before you lie down.  ?Deal with concerns or worries before bedtime. Make a list of things to work on for the next day so anxiety is reduced at night.  ?Exercise regularly, preferably four or more hours before bedtime.  ?Avoid prolonged use of phones or reading devices ("e-books") that give off light before bed. This can make it " "harder to fall asleep.  Relaxation -- Relaxation therapy involves progressively relaxing your muscles from your head down to your feet. Here is a sample of a relaxation program: Beginning with the muscles in your face, squeeze (contract) your muscles gently for one to two seconds and then relax. Repeat several times. Use the same technique for other muscle groups, usually in the following sequence: jaw and neck, shoulders, upper arms, lower arms, fingers, chest, abdomen, buttocks, thighs, calves, and feet. Repeat this cycle for 45 minutes, if necessary. This relaxation program can promote restfulness and sleep.   Stimulus control -- Stimulus control therapy is based on the idea that some people with insomnia have learned to associate the bedroom with staying awake rather than sleeping.  ?You should spend no more than 20 minutes lying in bed trying to fall asleep.  ?If you cannot fall asleep within 20 minutes, get up, go to another room and read or find another relaxing activity until you feel sleepy again. Activities such as eating, balancing your checkbook, doing housework, watching TV, or studying for a test, which "reward" you for staying awake, should be avoided.  ?When you start to feel sleepy, you can return to bed. If you cannot fall asleep in another 20 minutes, repeat the process.  ?Set an alarm clock and get up at the same time every day, including weekends.  ?Do not take a nap during the day.    Choose a diet you can do consistently (Mediterranean/DASH/Keto/Low Carb) Choosemyplate.gov                                         "

## 2022-10-18 NOTE — PROGRESS NOTES
RHEUMATOLOGY CLINIC INITIAL VISIT    Reason for consult:- elevated CRP    Chief complaints, HPI, ROS, EXAM, Assessment & Plans:-    Cindy Garcia is a 36 y.o. pleasant female who presents to be evaluated for elevated CRP.  This was drawn during workup for generalized pain.  Her ESR was also drawn at that time which was normal.  She also had CCP, Penaloza, Penaloza/RNP antibodies checked all of which were negative.  She has a history of migraines.  Recent carpal tunnel surgery.  Has history of sleep apnea.  Previously diagnosed with fibromyalgia.  States he has been trying to lose weight but weight stays the same.  She gets tired just from walking to her mailbox.  Is not able to perform a structured exercise program.  She states she occasionally has fevers.  Has diffuse hair thinning.  The patient denies patchy alopecia, oral/nasal ulcers, sicca symptoms, rashes, photosensitivity, pleuritic chest pain, cough, abdominal pain, diarrhea, bloody stool, weakness, numbness/tingling, and Raynaud's.  No evidence of synovitis, dactylitis or enthesitis.  No rashes.  Has a number of tender points.       Reviewed all available old and outside pertinent medical records.    All lab results personally reviewed and interpreted by me.    1. Elevated C-reactive protein (CRP)    2. Generalized pain        Problem List Items Addressed This Visit          Other    Generalized pain    Elevated C-reactive protein (CRP) - Primary       Patient referred to Rheumatology for elevated CRP  CRP was 10.6  sed rate was 13  This mild elevation in CRP is expected for a patient of this BMI  Patient with generalized pain as an fibromyalgia  Discussed importance of low intensity aerobic exercise (as in Yoga, Harvinder-Chi, walking or aquatherapy), restorative sleep, healthy diet and stress management  Provided handout with strategies to improve these lifestyle factors  History, exam and labwork thus far do not raise suspicion for underlying  rheumatologic disease    # Follow up if symptoms worsen or fail to improve.    Chronic comorbid conditions affecting medical decision making today:    Past Medical History:   Diagnosis Date    Carpal tunnel syndrome     Cluster headaches     Cubital tunnel syndrome     Diabetes mellitus     Digestive disorder     Diverticulosis     Dysphagia     Fibromyalgia     Fluid retention     GERD (gastroesophageal reflux disease)     Graves disease     Graves' disease     Hypertension     Meniere disease     Neuropathy     PE (pulmonary thromboembolism)     Sciatic nerve pain     Sleep apnea     TMJ (dislocation of temporomandibular joint)        Past Surgical History:   Procedure Laterality Date    ADENOIDECTOMY N/A     CARPAL TUNNEL RELEASE Left 5/19/2022    Procedure: RELEASE, CARPAL TUNNEL;  Surgeon: Eben Bolanos MD;  Location: [facility];  Service: Orthopedics;  Laterality: Left;    CHOLECYSTECTOMY N/A     laryngoscopy and other tracheoscopy N/A 12/23/2014    ULNAR TUNNEL RELEASE Left 5/19/2022    Procedure: RELEASE, CUBITAL TUNNEL;  Surgeon: Eben Bolanos MD;  Location: [facility];  Service: Orthopedics;  Laterality: Left;        Social History     Tobacco Use    Smoking status: Never    Smokeless tobacco: Never   Substance Use Topics    Alcohol use: Not Currently    Drug use: Never       Family History   Problem Relation Age of Onset    Diabetes Mother     COPD Mother     Hypertension Mother     Arthritis Mother     Kidney disease Mother     Vision loss Mother     Asthma Daughter     Cancer Paternal Aunt     Stroke Paternal Aunt        Review of patient's allergies indicates:  No Known Allergies    Medication List with Changes/Refills   Current Medications    ALBUTEROL (PROVENTIL/VENTOLIN HFA) 90 MCG/ACTUATION INHALER      2 puffs, Inhale, every 6 hr, PRN as needed for wheezing, # 8.5 g, 0 Refill(s), Start Date: 09/01/22 1:44:00 CDT    AMITRIPTYLINE (ELAVIL) 50 MG TABLET    ONE TABLET (BY MOUTH) AT BEDTIME    APIXABAN  (ELIQUIS) 5 MG TAB    Take 5 mg by mouth once daily.    BACLOFEN (LIORESAL) 10 MG TABLET    Take 1 tablet (10 mg total) by mouth 3 (three) times daily.    DICLOFENAC SODIUM (VOLTAREN) 1 % GEL    Apply topically.    DILTIAZEM (CARDIZEM CD) 120 MG CP24    Take 120 mg by mouth once daily.    ERGOCALCIFEROL (ERGOCALCIFEROL) 50,000 UNIT CAP    Take 1 capsule (50,000 Units total) by mouth twice a week.    FERROUS SULFATE 325 (65 FE) MG EC TABLET    Take 1 tablet (325 mg total) by mouth once daily.    FLUTICASONE PROPIONATE (FLONASE) 50 MCG/ACTUATION NASAL SPRAY    once daily.    FUROSEMIDE (LASIX) 20 MG TABLET    Take 20 mg by mouth once daily.    GABAPENTIN (NEURONTIN) 800 MG TABLET    ONE TABLET (BY MOUTH) THREE TIMES A DAY    GALCANEZUMAB-GNLM (EMGALITY PEN) 120 MG/ML PNIJ    Inject 120 mg into the skin every 28 days.    IPRATROPIUM (ATROVENT) 42 MCG (0.06 %) NASAL SPRAY    by Nasal route 2 (two) times a day.    LASMIDITAN (REYVOW) TABLET 100 MG    TAKE 2 TABLETS (BY MOUTH) EVERY DAY AS NEEDED FOR SEVERE HEADACHES ONLY. ALLOW AT LEAST 24 HOURS BETWEEN DOSES.    METFORMIN (GLUCOPHAGE-XR) 500 MG ER 24HR TABLET    TAKE 2 TABLETS (BY MOUTH) EVERY MORNING    NURTEC 75 MG ODT    Take 75 mg by mouth daily as needed.    ONDANSETRON (ZOFRAN) 8 MG TABLET    Take 8 mg by mouth.    PANTOPRAZOLE (PROTONIX) 40 MG TABLET    Take 40 mg by mouth once daily.    POTASSIUM CHLORIDE SA (K-DUR,KLOR-CON M) 10 MEQ TABLET    Take 1 tablet (10 mEq total) by mouth 2 (two) times daily.    TOPIRAMATE (TOPAMAX) 100 MG TABLET    Take 1 tablet (100 mg total) by mouth 2 (two) times daily.    TOPIRAMATE 25 MG CAPSULE    Take 1 capsule (25 mg total) by mouth 2 (two) times daily.    TRAMADOL (ULTRAM) 50 MG TABLET    Take 1 tablet (50 mg total) by mouth every 6 (six) hours.    VENLAFAXINE (EFFEXOR) 37.5 MG TAB    Take 37.5 mg by mouth once daily.         Disclaimer: This note was prepared using voice recognition system and is likely to have sound alike  errors and is not proofread.  Please message me with any questions.      Thank you for allowing me to participate in the care of Cindy Garcia.    Abdiaziz Dixon MD

## 2022-11-08 ENCOUNTER — OFFICE VISIT (OUTPATIENT)
Dept: NEUROLOGY | Facility: CLINIC | Age: 37
End: 2022-11-08
Payer: MEDICAID

## 2022-11-08 DIAGNOSIS — G43.E09 CHRONIC MIGRAINE WITH AURA: Primary | ICD-10-CM

## 2022-11-08 PROCEDURE — 99214 PR OFFICE/OUTPT VISIT, EST, LEVL IV, 30-39 MIN: ICD-10-PCS | Mod: 95,,, | Performed by: NURSE PRACTITIONER

## 2022-11-08 PROCEDURE — 1160F RVW MEDS BY RX/DR IN RCRD: CPT | Mod: CPTII,95,, | Performed by: NURSE PRACTITIONER

## 2022-11-08 PROCEDURE — 99214 OFFICE O/P EST MOD 30 MIN: CPT | Mod: 95,,, | Performed by: NURSE PRACTITIONER

## 2022-11-08 PROCEDURE — 1159F PR MEDICATION LIST DOCUMENTED IN MEDICAL RECORD: ICD-10-PCS | Mod: CPTII,95,, | Performed by: NURSE PRACTITIONER

## 2022-11-08 PROCEDURE — 1160F PR REVIEW ALL MEDS BY PRESCRIBER/CLIN PHARMACIST DOCUMENTED: ICD-10-PCS | Mod: CPTII,95,, | Performed by: NURSE PRACTITIONER

## 2022-11-08 PROCEDURE — 1159F MED LIST DOCD IN RCRD: CPT | Mod: CPTII,95,, | Performed by: NURSE PRACTITIONER

## 2022-11-08 RX ORDER — TOPIRAMATE 25 MG/1
1 TABLET ORAL 2 TIMES DAILY
COMMUNITY
Start: 2022-10-25 | End: 2022-11-08 | Stop reason: SDUPTHER

## 2022-11-08 RX ORDER — MUPIROCIN 20 MG/G
OINTMENT TOPICAL 2 TIMES DAILY
COMMUNITY
Start: 2022-10-27 | End: 2023-03-27

## 2022-11-08 RX ORDER — FERROUS SULFATE TAB 325 MG (65 MG ELEMENTAL FE) 325 (65 FE) MG
1 TAB ORAL DAILY
COMMUNITY
Start: 2022-10-18 | End: 2023-10-24 | Stop reason: SDUPTHER

## 2022-11-08 RX ORDER — TOPIRAMATE 25 MG/1
25 TABLET ORAL 2 TIMES DAILY
Qty: 30 TABLET | Refills: 11 | Status: SHIPPED | OUTPATIENT
Start: 2022-11-08 | End: 2022-11-29 | Stop reason: SDUPTHER

## 2022-11-08 RX ORDER — CETIRIZINE HYDROCHLORIDE 10 MG/1
11 TABLET ORAL DAILY
COMMUNITY
Start: 2022-10-27

## 2022-11-08 RX ORDER — GALCANEZUMAB 120 MG/ML
120 INJECTION, SOLUTION SUBCUTANEOUS
Qty: 1 EACH | Refills: 11 | Status: SHIPPED | OUTPATIENT
Start: 2022-11-08 | End: 2023-05-08 | Stop reason: SDUPTHER

## 2022-11-08 RX ORDER — LASMIDITAN 100 MG/1
TABLET ORAL
Qty: 16 TABLET | Refills: 2 | Status: SHIPPED | OUTPATIENT
Start: 2022-11-08

## 2022-11-08 RX ORDER — TOPIRAMATE 100 MG/1
100 TABLET, FILM COATED ORAL 2 TIMES DAILY
Qty: 60 TABLET | Refills: 11 | Status: SHIPPED | OUTPATIENT
Start: 2022-11-08 | End: 2022-11-28 | Stop reason: SDUPTHER

## 2022-11-08 RX ORDER — DILTIAZEM HYDROCHLORIDE 120 MG/1
1 CAPSULE, EXTENDED RELEASE ORAL DAILY
COMMUNITY
Start: 2022-10-25 | End: 2022-11-08

## 2022-11-08 RX ORDER — POTASSIUM CHLORIDE 750 MG/1
1 TABLET, EXTENDED RELEASE ORAL 2 TIMES DAILY
COMMUNITY
Start: 2022-10-18 | End: 2022-12-22 | Stop reason: SDUPTHER

## 2022-11-08 NOTE — PROGRESS NOTES
This is a real-time audio/video visit that was performed with the originating site at patient's home and the distant site, [facility]. Verbal consent to participate in interactive audio & video visit was obtained.    I discussed with the patient regarding the nature of our telehealth visits, that:    - Our sessions are not being recorded and that personal health information is protected  - Provider would evaluate the patient and recommend diagnostics and treatments based on my assessment  - [facility] will provide follow up care in person if/when the patient needs it.     Subjective:       Patient ID: Cindy Garcia is a 37 y.o. female.    Chief Complaint: Migraine    HPI  This is a 36 year old female with PMHX of PEs, HERB, TMJ issues, HTN, GERD, Fibromyalgia, Grave's Disease, Sickle Cell Trait, TIA, who was referred for medically intractable chronic migraine with visual aura with status migrainosus not responding to Topamax or Elavil. Also complaining of insomnia. Patient was last seen on 8/4/2022.   During that visit, Emgality 120mg/mL once per month was restarted, (Nurtec ODT 75mg once a day PRN, Lasmiditan 200mg PRN for severe headaches).    Today, Pt states migraines improved on Emgality. Averaging 4 migraines/month. Respond well to Ubrelvy. Woke up with migraine this AM. Dx w/HERB, utilizes CPAP. Not monitoring type of food intake as possibility of migraine. Denies photophobia/phonophobia/nausea.     Age of Onset - childhood    Semiology - left temporal, pounding, 10/10, lasting up to 48 hrs, with nausea, photophobia, phonophobia. Associated with Left eye vision peripheral aura.     Frequency - 16 migraine headache days/month; 4 migraine/month on Emgality    Exacerbating Factors - denied    Risk Factors -  - Family history of headache disorder? sister and daughter with migraine.  - History of Head Trauma? denied  -  History of CNS infection? denied  - History of underlying mood disorder? denied  - Illicit drug use? denied  - Tobacco use? denied  - History of sleep disorder? HERB on CPAP. Still staying up the whole night for this past 1 month. Having trouble falling asleep. Stays asleep for 5-6 hours when she falls asleep.     Workup -  - MRI Brain +/- Yassine 11/24/2015 - I have reviewed the study independently and with the patient. Unremarkable  - MRI Brain w/o Yassine 3/1/2018 - I have reviewed the study independently and with the patient. Unremarkable.  - MRI Face/Orbit +/- Yassine 6/5/2018 - I have reviewed the study independently and with the patient. Unremarkable  - MRI Pituitary +/- Yassine 11/13/2019 - I have reviewed the study independently and with the patient. Unremarkable  - Lumbar Puncture:  - MRA Head:  - MRV Head:    Current ppx meds -  TPM IR 100mg BID -  Emgality 120mg/mL once per month (5/7/2021 - present) - effective  Effexor 37.5mg daily (8/4/2021 - present) - started by mental health provider    Current abortive meds -  Tylenol PRN - ineffective. Takes it daily for the six months.  Lasmiditan 200mg PRN for severe headaches (5/7/2021 - present) - partially effective    Prior ppx meds -  Amitriptyline 50mg qhs    Prior abortive meds -   Naproxen 500mg BID PRN  Nurtec ODT 75mg once a day PRN (5/7/2021 - 2/8/2022) - ineffective    Review of Systems   Constitutional: no fever, fatigue, weakness  Eye: no vision loss, eye redness, drainage, or pain  ENMT: no sore throat, ear pain, sinus pain/congestion, nasal congestion/drainage  Respiratory: no cough, no wheezing, no shortness of breath  Cardiovascular: no chest pain, no palpitations, no edema  Gastrointestinal: no nausea, vomiting, or diarrhea. No abdominal pain  Genitourinary: no dysuria, no urinary frequency or urgency, no hematuria  Hema/Lymph: no abnormal bruising or bleeding  Endocrine: no heat or cold intolerance, no excessive thirst or excessive  urination  Musculoskeletal: no muscle or joint pain, no joint swelling  Integumentary: no skin rash or abnormal lesion  Psychiatric: no depressed mood, no anxiety, no visual/auditory hallucinations, no delusions, no suicidal or homicidal ideation  Neurologic: as per HPI    Objective:      Physical Exam    General: no acute distress  Eye: unable to assess due to nature of visit  HENT: unable to assess due to nature of visit  Neck: unable to assess due to nature of visit  Respiratory: no distress on RA  Cardiovascular: unable to assess due to nature of visit   Gastrointestinal: unable to assess due to nature of visit  Genitourinary: unable to assess due to nature of visit  Musculoskeletal: rull ROM all extremities without difficulty  Integumentary: no rashes or lesions noted    Comprehensive Neurological Exam:  Mental Status: AAOx4 Naming, repetition, reading, and writing wnl. Comprehension wnl. No dysarthria.   CN II - XII: No ptosis OU, EOMI without nystagmus, Hearing grossly intact, Face Symmetric, Tongue and Uvula midline, Trapezius symmetric bilateral.   Motor: tone and bulk wnl throughout, no abnormal involuntary or voluntary movements, no satelliting w/orbiting, Fine finger movements wnl b/l, No pronator drift.   Sensory: unable to assess due to nature of visit  Reflexes: unable to assess due to nature of visit  Cerebellar: FNF wnl b/l  Romberg: Negative  Gait: normal, bilat arm swing intact    Assessment:       This is a 36 year old female with PMHX of PEs, HERB, TMJ issues, HTN, GERD, Fibromyalgia, Grave's Disease, Sickle Cell Trait, TIA, who was referred for medically intractable chronic migraine with visual aura with status migrainosus not responding to Topamax or Elavil. Recent increase in migraines to 16 on average per month. Denies due to sleep as she utilizes CPAP. Not monitoring food intake, no HA diary. Discussed possible Botox, not interested at this time. Ubrelvy effective for migraine, lasmitidan  used for severe migraines. Emgality effective for migraine.    1. Chronic migraine with aura      Plan:       [] c/w Ubrelvy 100mg PO PRN migraine - triptans are contraindicated in pts with hx of HTN and TIA  [] c/w Emgality 120mg/mL once per month  [] c/w Lasmiditan 200mg PRN for severe headaches  [] c/w Topamax 125mg BID  [] consider Botox in future  [] handout on keeping Ha diary provided    RTC 6 MONTHS    Headache education provided - including good sleep hygiene, stress management, medication overuse education provided - using more 3 OTC per week may worsen headaches, High intensity interval training has shown to reduce headache frequency. Low carb, high protein has shown to reduce headache frequency. Patient is instructed to keep headache diary.    I have explained the treatment plan, diagnosis, and prognosis to the patient, caretaker, family. All questions are answered to the best of my knowledge.    Face to Face Time 30 minute, including, counseling, education, review of test results, relevant medical records, and coordination of care.

## 2022-11-22 DIAGNOSIS — R52 GENERALIZED PAIN: ICD-10-CM

## 2022-11-23 RX ORDER — TRAMADOL HYDROCHLORIDE 50 MG/1
50 TABLET ORAL EVERY 6 HOURS
Qty: 28 TABLET | Refills: 0 | OUTPATIENT
Start: 2022-11-23

## 2022-11-28 ENCOUNTER — PATIENT MESSAGE (OUTPATIENT)
Dept: INTERNAL MEDICINE | Facility: CLINIC | Age: 37
End: 2022-11-28
Payer: MEDICAID

## 2022-11-29 DIAGNOSIS — G43.E09 CHRONIC MIGRAINE WITH AURA: ICD-10-CM

## 2022-11-29 DIAGNOSIS — I27.82 CHRONIC PULMONARY EMBOLISM, UNSPECIFIED PULMONARY EMBOLISM TYPE, UNSPECIFIED WHETHER ACUTE COR PULMONALE PRESENT: ICD-10-CM

## 2022-11-29 DIAGNOSIS — R52 GENERALIZED PAIN: Primary | ICD-10-CM

## 2022-11-29 RX ORDER — GABAPENTIN 800 MG/1
TABLET ORAL
Qty: 270 TABLET | Refills: 1 | Status: SHIPPED | OUTPATIENT
Start: 2022-11-29 | End: 2023-03-27 | Stop reason: SDUPTHER

## 2022-11-29 RX ORDER — TOPIRAMATE 100 MG/1
100 TABLET, FILM COATED ORAL 2 TIMES DAILY
Qty: 180 TABLET | Refills: 1 | Status: SHIPPED | OUTPATIENT
Start: 2022-11-29 | End: 2023-04-12 | Stop reason: SDUPTHER

## 2022-11-29 RX ORDER — TOPIRAMATE 25 MG/1
25 TABLET ORAL 2 TIMES DAILY
Qty: 180 TABLET | Refills: 1 | Status: SHIPPED | OUTPATIENT
Start: 2022-11-29 | End: 2023-04-05 | Stop reason: SDUPTHER

## 2022-12-15 ENCOUNTER — TELEPHONE (OUTPATIENT)
Dept: FAMILY MEDICINE | Facility: CLINIC | Age: 37
End: 2022-12-15
Payer: MEDICAID

## 2022-12-15 NOTE — TELEPHONE ENCOUNTER
Sending msg as a courtesy...    Citlaly with [facility] left a msg on the voicemail of [facility] refill line, requesting blood work for surgery clearance be faxed to their ofc at Fax# [phone]. Please call ofc with any questions at phone # [phone]. Thank you!

## 2022-12-16 NOTE — TELEPHONE ENCOUNTER
Ms. Garcia is having surgery? If she is, we need to schedule her with surgery clearance.    Thank you,    JERRY Vences

## 2022-12-21 RX ORDER — TIZANIDINE 4 MG/1
2 TABLET ORAL NIGHTLY PRN
COMMUNITY
Start: 2022-12-14 | End: 2023-03-27

## 2022-12-21 RX ORDER — NAPROXEN SODIUM 500 MG/1
500 TABLET, FILM COATED, EXTENDED RELEASE ORAL EVERY 12 HOURS PRN
COMMUNITY
Start: 2022-11-09 | End: 2023-03-27

## 2022-12-21 RX ORDER — UBROGEPANT 100 MG/1
100 TABLET ORAL DAILY PRN
COMMUNITY
Start: 2022-11-10 | End: 2023-05-08 | Stop reason: SDUPTHER

## 2022-12-22 ENCOUNTER — OFFICE VISIT (OUTPATIENT)
Dept: INTERNAL MEDICINE | Facility: CLINIC | Age: 37
End: 2022-12-22
Payer: MEDICAID

## 2022-12-22 DIAGNOSIS — F41.9 ANXIETY: Primary | ICD-10-CM

## 2022-12-22 DIAGNOSIS — E87.6 HYPOKALEMIA: ICD-10-CM

## 2022-12-22 DIAGNOSIS — K21.9 GASTROESOPHAGEAL REFLUX DISEASE WITHOUT ESOPHAGITIS: ICD-10-CM

## 2022-12-22 DIAGNOSIS — E55.9 VITAMIN D INSUFFICIENCY: ICD-10-CM

## 2022-12-22 PROCEDURE — 99214 OFFICE O/P EST MOD 30 MIN: CPT | Mod: 95,,,

## 2022-12-22 PROCEDURE — 1160F PR REVIEW ALL MEDS BY PRESCRIBER/CLIN PHARMACIST DOCUMENTED: ICD-10-PCS | Mod: CPTII,95,,

## 2022-12-22 PROCEDURE — 1160F RVW MEDS BY RX/DR IN RCRD: CPT | Mod: CPTII,95,,

## 2022-12-22 PROCEDURE — 99214 PR OFFICE/OUTPT VISIT, EST, LEVL IV, 30-39 MIN: ICD-10-PCS | Mod: 95,,,

## 2022-12-22 PROCEDURE — 1159F PR MEDICATION LIST DOCUMENTED IN MEDICAL RECORD: ICD-10-PCS | Mod: CPTII,95,,

## 2022-12-22 PROCEDURE — 1159F MED LIST DOCD IN RCRD: CPT | Mod: CPTII,95,,

## 2022-12-22 RX ORDER — CYCLOBENZAPRINE HCL 5 MG
5 TABLET ORAL 3 TIMES DAILY PRN
COMMUNITY
Start: 2022-12-15 | End: 2023-03-27

## 2022-12-22 RX ORDER — PANTOPRAZOLE SODIUM 40 MG/1
40 TABLET, DELAYED RELEASE ORAL DAILY
Qty: 90 TABLET | Refills: 1 | Status: SHIPPED | OUTPATIENT
Start: 2022-12-22 | End: 2023-03-27 | Stop reason: SDUPTHER

## 2022-12-22 RX ORDER — ASPIRIN 325 MG
50000 TABLET, DELAYED RELEASE (ENTERIC COATED) ORAL
Qty: 24 CAPSULE | Refills: 0 | Status: SHIPPED | OUTPATIENT
Start: 2022-12-22 | End: 2023-03-27

## 2022-12-22 RX ORDER — BUSPIRONE HYDROCHLORIDE 5 MG/1
5 TABLET ORAL 2 TIMES DAILY
Qty: 60 TABLET | Refills: 11 | Status: SHIPPED | OUTPATIENT
Start: 2022-12-22 | End: 2023-10-24 | Stop reason: SDUPTHER

## 2022-12-22 RX ORDER — ERGOCALCIFEROL 1.25 MG/1
50000 CAPSULE ORAL
Qty: 24 CAPSULE | Refills: 1 | Status: SHIPPED | OUTPATIENT
Start: 2022-12-22 | End: 2022-12-22

## 2022-12-22 RX ORDER — HYDROXYZINE HYDROCHLORIDE 25 MG/1
25 TABLET, FILM COATED ORAL 3 TIMES DAILY PRN
Qty: 60 TABLET | Refills: 1 | Status: SHIPPED | OUTPATIENT
Start: 2022-12-22 | End: 2023-02-01 | Stop reason: SDUPTHER

## 2022-12-22 RX ORDER — POTASSIUM CHLORIDE 750 MG/1
10 TABLET, EXTENDED RELEASE ORAL 2 TIMES DAILY
Qty: 180 TABLET | Refills: 1 | Status: SHIPPED | OUTPATIENT
Start: 2022-12-22 | End: 2023-10-24 | Stop reason: SDUPTHER

## 2022-12-22 NOTE — ASSESSMENT & PLAN NOTE
Lab Results   Component Value Date    K 3.7 12/12/2022     Take potassium chloride as prescribed.   Incorporated potassium rich foods into you diet such as nuts, seeds, peas, beans (dried), whole grains, fresh fruits and vegetables, lean red meat and yogurt.   Education provided on additional sources of potassium-rich foods including: carrots, broccoli, potatoes, celery, spinach, squash, tomatoes, avocados, apricots, cantaloupe, bananas, nectarines, prunes, figs and raisins.   Seek health care assistance if you experience chest pain, shortness of breath, vomiting or diarrhea lasting more than 2 days, fainting, palpitations, or weakness worsens.

## 2022-12-22 NOTE — ASSESSMENT & PLAN NOTE
Lab Results   Component Value Date    [id number] 16.1 (L) 12/12/2022   Continue vitamin D3 81286 iu twice weekly.  Educated on increasing foods high in Vitamin D such as fish oil, cod liver oil, salmon, milk fortified with vitamin D.  Begin taking Vitamin D 2000 I.U. tablets daily (purchase over the counter).  Repeat Vitamin D level as ordered.

## 2022-12-22 NOTE — PROGRESS NOTES
VISIT DATE: 22    PATIENT NAME: Cindy Garcia  : 1985  MRN: [id number]     The patient location is: [place], [place]  The chief complaint leading to consultation is: Lab review    Visit type: audio only    Audio time with patient: 20 minutes  33 minutes of total time spent on the encounter, which includes face to face time and non-face to face time preparing to see the patient (eg, review of tests), Obtaining and/or reviewing separately obtained history, Documenting clinical information in the electronic or other health record, Independently interpreting results (not separately reported) and communicating results to the patient/family/caregiver, or Care coordination (not separately reported).     Each patient to whom he or she provides medical services by telemedicine is:  (1) informed of the relationship between the physician and patient and the respective role of any other health care provider with respect to management of the patient; and (2) notified that he or she may decline to receive medical services by telemedicine and may withdraw from such care at any time.    Reason for visit / Chief Complaint:  Follow-up, Labs Only, and Anxiety       History of Present Illness (HPI):  Cindy Garcia is a 37 y.o. Black female presenting virtually by audio only for Follow-up, Labs Only, and Anxiety. PMH PE, optic neuritis, peripheral neuropathy, HERB, Meniere's, TMJ, cluster headaches, GERD, HTN, diverticulosis, DDD w/ herniated disc, Grave's disease, sickle cell trait, TIA, and fibromyalgia. She is followed by [facility] neuro clinic for headaches, Dr. Ramsay (ENT) in [place], Dr. Sharon Casiano (GYN), Dr. Chinmay Benitez (GI) and Dr. Vignesh Jaquez, and [facility] in [place] for multiple PE's, palpitations, and SOB. She was previously followed by Dr. Andersen (rheumatology) and a rheumatology clinic in [place], and Dr. Marciano Ramos (endocrinology). She was seen by Dr. Garcia (rheumatology) on 10/18/2022,  his recommendation was for patient to perform low intensity aerobic exercises. No f/u indicate..      All pertinent labs dated 12/12/2022 and diagnotic tests reviewed and discussed with patient. Vitamin D continues to be deficient. She is prescribed vitamin D3 02427 twice weekly, but it has [facility] since 9/27/20[facility].    GAD7-8, PHQ-1. She states she has been having increase in anxiety after mother passed away the day before Thanksgiving. She endorses not sleeping well. She declines counseling at this time.    Denies smoking, drinking, or illicit drug use. Denies chest pain, shortness of breath, cough, headache, dizziness, weakness, abdominal pain, nausea, vomiting, diarrhea, constipation, dysuria, depression, anxiety, SI, and HI.     Cervical Cancer Screening - Next appt is in 9/2022, she will send records.   Breast Cancer Screening - Deferred due to age.  Colon Cancer Screening - Deferred due to age.  Vaccinations: Declines vaccines.          Review of Systems     Review of Systems   Constitutional: Negative.    HENT: Negative.     Eyes: Negative.    Respiratory: Negative.     Cardiovascular: Negative.    Gastrointestinal: Negative.    Endocrine: Negative.    Genitourinary: Negative.    Musculoskeletal: Negative.    Skin: Negative.    Allergic/Immunologic: Negative.    Neurological: Negative.    Hematological: Negative.    Psychiatric/Behavioral:  Positive for sleep disturbance. Negative for dysphoric mood and suicidal ideas. The patient is nervous/anxious.    All other systems reviewed and are negative.    Medical / Social / Family History     Past Medical History:   Diagnosis Date    Carpal tunnel syndrome     Cluster headaches     Cubital tunnel syndrome     Diabetes mellitus     Digestive disorder     Diverticulosis     Dysphagia     Fibromyalgia     Fluid retention     GERD (gastroesophageal reflux disease)     Graves disease     Graves' disease     Hypertension     Meniere  disease     Neuropathy     PE (pulmonary thromboembolism)     Sciatic nerve pain     Sleep apnea     TMJ (dislocation of temporomandibular joint)          Past Surgical History:   Procedure Laterality Date    ADENOIDECTOMY N/A     CARPAL TUNNEL RELEASE Left 5/19/2022    Procedure: RELEASE, CARPAL TUNNEL;  Surgeon: Eben Bolanos MD;  Location: [facility];  Service: Orthopedics;  Laterality: Left;    CHOLECYSTECTOMY N/A     laryngoscopy and other tracheoscopy N/A 12/23/2014    ULNAR TUNNEL RELEASE Left 5/19/2022    Procedure: RELEASE, CUBITAL TUNNEL;  Surgeon: Eben Bolanos MD;  Location: [facility];  Service: Orthopedics;  Laterality: Left;         Social History  Cindy Garcia's  reports that she has never smoked. She has never used smokeless tobacco. She reports that she does not currently use alcohol. She reports that she does not use drugs.    Family History  Cindy Garcia's family history includes Arthritis in her mother; Asthma in her daughter; COPD in her mother; Cancer in her paternal aunt; Diabetes in her mother; Hypertension in her mother; Kidney disease in her mother; Stroke in her paternal aunt; Vision loss in her mother.    Medications and Allergies     Medications  Outpatient Medications Marked as Taking for the 12/22/22 encounter (Office Visit) with JAIMIE Sutton   Medication Sig Dispense Refill    albuterol (PROVENTIL/VENTOLIN HFA) 90 mcg/actuation inhaler   2 puffs, Inhale, every 6 hr, PRN as needed for wheezing, # 8.5 g, 0 Refill(s), Start Date: 09/01/22 1:44:00 CDT      apixaban (ELIQUIS) 5 mg Tab Take 1 tablet (5 mg total) by mouth once daily. 90 tablet 1    baclofen (LIORESAL) 10 MG tablet Take 1 tablet (10 mg total) by mouth 3 (three) times daily. 90 tablet 1    cetirizine (ZYRTEC) 10 MG tablet Take 11 tablets by mouth once daily.      diclofenac sodium (VOLTAREN) 1 % Gel Apply topically.      diltiaZEM (CARDIZEM CD) 120 MG Cp24 Take 120 mg by mouth once daily.       FEROSUL 325 mg (65 mg iron) Tab tablet Take 1 tablet by mouth once daily.      fluticasone propionate (FLONASE) 50 mcg/actuation nasal spray once daily.      furosemide (LASIX) 20 MG tablet Take 20 mg by mouth once daily.      gabapentin (NEURONTIN) 800 MG tablet ONE TABLET (BY MOUTH) THREE TIMES A  tablet 1    galcanezumab-gnlm (EMGALITY PEN) 120 mg/mL PnIj Inject 120 mg into the skin every 28 days. 1 each 11    ipratropium (ATROVENT) 42 mcg (0.06 %) nasal spray by Nasal route 2 (two) times a day.      lasmiditan (REYVOW) tablet 100 mg TAKE 2 TABLETS (BY MOUTH) EVERY DAY AS NEEDED FOR SEVERE HEADACHES ONLY. ALLOW AT LEAST 24 HOURS BETWEEN DOSES. 16 tablet 2    metFORMIN (GLUCOPHAGE-XR) 500 MG ER 24hr tablet TAKE 2 TABLETS (BY MOUTH) EVERY MORNING      metoclopramide HCl (REGLAN) 5 MG tablet Take 5 mg by mouth 4 (four) times daily.      NURTEC 75 mg odt Take 75 mg by mouth daily as needed.      ondansetron (ZOFRAN) 8 MG tablet Take 8 mg by mouth every 8 (eight) hours as needed.      tiZANidine (ZANAFLEX) 4 MG tablet Take 2 mg by mouth nightly as needed.      topiramate (TOPAMAX) 100 MG tablet Take 1 tablet (100 mg total) by mouth 2 (two) times daily. 180 tablet 1    topiramate (TOPAMAX) 25 MG tablet Take 1 tablet (25 mg total) by mouth 2 (two) times daily. 180 tablet 1    traMADoL (ULTRAM) 50 mg tablet Take 1 tablet (50 mg total) by mouth every 6 (six) hours. 28 tablet 0    ubrogepant (UBRELVY) 100 mg tablet Take 100 mg by mouth daily as needed.      [DISCONTINUED] amitriptyline (ELAVIL) 50 MG tablet ONE TABLET (BY MOUTH) AT BEDTIME      [DISCONTINUED] pantoprazole (PROTONIX) 40 MG tablet Take 40 mg by mouth once daily.      [DISCONTINUED] potassium chloride (KLOR-CON) 10 MEQ TbSR Take 1 tablet by mouth 2 (two) times daily.         Allergies  Review of patient's allergies indicates:  No Known Allergies    Physical Examination   No vitals due to virtual visit.  Physical Exam  Pulmonary:      Effort: Pulmonary  effort is normal.   Neurological:      General: No focal deficit present.      Mental Status: She is alert and oriented to person, place, and time.   Psychiatric:         Mood and Affect: Mood normal.         Behavior: Behavior normal.         Thought Content: Thought content normal.         Judgment: Judgment normal.         Results     Lab Results   Component Value Date    WBC 5.0 12/12/2022    RBC 4.20 12/12/2022    HGB 12.2 12/12/2022    HCT 37.2 12/12/2022    MCV 88.6 12/12/2022    MCH 29.0 12/12/2022    MCHC 32.8 (L) 12/12/2022    RDW 13.4 12/12/2022     12/12/2022    MPV 11.5 (H) 12/12/2022     Lab Results   Component Value Date     12/12/2022    K 3.7 12/12/2022    CO2 22 12/12/2022    BUN 7.3 12/12/2022    CREATININE 1.10 (H) 12/12/2022    CALCIUM 9.5 12/12/2022    ALBUMIN 4.0 12/12/2022    BILITOT 0.6 12/12/2022    ALKPHOS 95 12/12/2022    AST 14 12/12/2022    ALT 16 12/12/2022    EGFRIFAFRICA >60 05/18/2022    EGFRNONAA 56 (L) 10/19/2021     Lab Results   Component Value Date    TSH 0.5218 12/12/2022     Lab Results   Component Value Date    CHOL 159 08/08/2022    HDL 38 08/08/2022    LDL 96.00 08/08/2022    TRIG 125 08/08/2022     Lab Results   Component Value Date    COLORUA Light-Yellow 12/12/2022    SGUA 1.011 12/12/2022    PROTEINUA Negative 12/12/2022    GLUCOSEUA Normal 12/12/2022    BILIRUBINUA Negative 12/12/2022    BLOODUA Negative 12/12/2022    RBCUA 0-5 12/12/2022    BACTERIA None Seen 12/12/2022    NITRITE Negative 07/09/2020    LEUKOCYTESUR Negative 12/12/2022    UROBILINOGEN Normal 12/12/2022      No results found for: MICROALBUR, [id number]  Lab Results   Component Value Date    OGMDNFJX14PR 16.1 (L) 12/12/2022    FOLATE 7.3 08/08/2022     Lab Results   Component Value Date    HIV Nonreactive 08/08/2022    HEPAIGM Nonreactive 08/08/2022    HEPBCOREM Nonreactive 08/08/2022    HEPCAB Nonreactive 08/08/2022     No results found for: FITDIAG, COLOGUARD  No results found for:  KAYCEE [id number]        Assessment and Plan (including Health Maintenance)     Health Maintenance Due   Topic Date Due    Cervical Cancer Screening  Never done    COVID-19 Vaccine (1) Never done    TETANUS VACCINE  06/24/2018    Influenza Vaccine (1) Never done       Problem List Items Addressed This Visit          Psychiatric    Anxiety - Primary    Current Assessment & Plan     CROW-7 Score: 8  Interpretation: Mild Anxiety   Rx buspirone and hydroxyzine.  RTC in 6 weeks for evaluation.  Declines counseling.  Take meds as prescribed.  Practice deep breathing or abdominal breathing exercises when anxiety occurs.  Exercise daily. Get sunlight daily.  Avoid caffeine, alcohol and stimulants.  Do not use illicit drugs.  Practice positive phrases and repeat throughout the day, yoga, lavender scents or Chamomile tea will help anxiety.  Set healthy boundaries, avoid people and conversations that increase stress.  Reports any symptoms of suicidal or homicidal ideations immediately, go to nearest emergency room.           Relevant Medications    busPIRone (BUSPAR) 5 MG Tab    hydrOXYzine HCL (ATARAX) 25 MG tablet       Renal/    Hypokalemia    Current Assessment & Plan     Lab Results   Component Value Date    K 3.7 12/12/2022   Take potassium chloride as prescribed.   Incorporated potassium rich foods into you diet such as nuts, seeds, peas, beans (dried), whole grains, fresh fruits and vegetables, lean red meat and yogurt.   Education provided on additional sources of potassium-rich foods including: carrots, broccoli, potatoes, celery, spinach, squash, tomatoes, avocados, apricots, cantaloupe, bananas, nectarines, prunes, figs and raisins.   Seek health care assistance if you experience chest pain, shortness of breath, vomiting or diarrhea lasting more than 2 days, fainting, palpitations, or weakness worsens.           Relevant Medications    potassium chloride (KLOR-CON) 10 MEQ TbSR       Endocrine    Vitamin D  insufficiency    Current Assessment & Plan     Lab Results   Component Value Date    [id number] 16.1 (L) 12/12/2022   Continue vitamin D3 84136 iu twice weekly.  Educated on increasing foods high in Vitamin D such as fish oil, cod liver oil, salmon, milk fortified with vitamin D.  Begin taking Vitamin D 2000 I.U. tablets daily (purchase over the counter).  Repeat Vitamin D level as ordered.           Relevant Medications    cholecalciferol, vitamin D3, 1,250 mcg (50,000 unit) capsule       GI    Gastroesophageal reflux disease without esophagitis    Current Assessment & Plan     Continue pantoprazole - refilled today.  Avoid spicy, acidic, fried food and alcohol.    Eat 2-3 hours before bed.   Avoid tight fitting clothes and chew food thoroughly.    Reduce caffeine intake, avoid soda.    Take meds as prescribed.             Relevant Medications    pantoprazole (PROTONIX) 40 MG tablet       The patient has no Health Maintenance topics of status Not Due    Future Appointments   Date Time Provider Department Center   2/2/2023  8:30 AM JAIMIE [facility]    5/8/2023  9:30 AM CHRISTIE Ashton [facility]        RTC in 6 week(s) for evaluation of anxiety.  RTC prn.  Labs not needed prior to next visit.           Signature:  JAIMIE Sutton  [facility]  [address] [id number]    Date of encounter: 12/22/22

## 2022-12-22 NOTE — ASSESSMENT & PLAN NOTE
CROW-7 Score: 8  Interpretation: Mild Anxiety   Rx buspirone and hydroxyzine.  RTC in 6 weeks for evaluation.  Declines counseling.  Take meds as prescribed.  Practice deep breathing or abdominal breathing exercises when anxiety occurs.  Exercise daily. Get sunlight daily.  Avoid caffeine, alcohol and stimulants.  Do not use illicit drugs.  Practice positive phrases and repeat throughout the day, yoga, lavender scents or Chamomile tea will help anxiety.  Set healthy boundaries, avoid people and conversations that increase stress.  Reports any symptoms of suicidal or homicidal ideations immediately, go to nearest emergency room.

## 2022-12-22 NOTE — ASSESSMENT & PLAN NOTE
Continue pantoprazole - refilled today.  Avoid spicy, acidic, fried food and alcohol.    Eat 2-3 hours before bed.   Avoid tight fitting clothes and chew food thoroughly.    Reduce caffeine intake, avoid soda.    Take meds as prescribed.

## 2023-02-01 DIAGNOSIS — F41.9 ANXIETY: ICD-10-CM

## 2023-02-01 RX ORDER — HYDROXYZINE HYDROCHLORIDE 25 MG/1
25 TABLET, FILM COATED ORAL 3 TIMES DAILY PRN
Qty: 60 TABLET | Refills: 1 | Status: SHIPPED | OUTPATIENT
Start: 2023-02-01 | End: 2023-03-15 | Stop reason: SDUPTHER

## 2023-02-01 NOTE — TELEPHONE ENCOUNTER
Received a refill request through the fax que for hydrozyzine    LOV: 12/22/22  NOV: 02/14/23  Cancellations: 02/02/23

## 2023-03-15 DIAGNOSIS — F41.9 ANXIETY: ICD-10-CM

## 2023-03-15 NOTE — TELEPHONE ENCOUNTER
Received a refill request through the fax que for hydroxyzine    LOV: 12/22/22  NOV: 03/27/23  No Shows: 02/27/23  Cancellations: 02/14/23, 02/02/23

## 2023-03-16 RX ORDER — HYDROXYZINE HYDROCHLORIDE 25 MG/1
25 TABLET, FILM COATED ORAL 3 TIMES DAILY PRN
Qty: 60 TABLET | Refills: 1 | Status: SHIPPED | OUTPATIENT
Start: 2023-03-16 | End: 2023-10-24 | Stop reason: SDUPTHER

## 2023-03-27 ENCOUNTER — HOSPITAL ENCOUNTER (OUTPATIENT)
Dept: RADIOLOGY | Facility: HOSPITAL | Age: 38
Discharge: HOME | End: 2023-03-27
Payer: MEDICAID

## 2023-03-27 ENCOUNTER — OFFICE VISIT (OUTPATIENT)
Dept: INTERNAL MEDICINE | Facility: CLINIC | Age: 38
End: 2023-03-27
Payer: MEDICAID

## 2023-03-27 VITALS
HEIGHT: 63 IN | HEART RATE: 97 BPM | TEMPERATURE: 98 F | DIASTOLIC BLOOD PRESSURE: 69 MMHG | RESPIRATION RATE: 18 BRPM | SYSTOLIC BLOOD PRESSURE: 115 MMHG | WEIGHT: 211.88 LBS | OXYGEN SATURATION: 96 % | BODY MASS INDEX: 37.54 KG/M2

## 2023-03-27 DIAGNOSIS — K21.9 GASTROESOPHAGEAL REFLUX DISEASE WITHOUT ESOPHAGITIS: ICD-10-CM

## 2023-03-27 DIAGNOSIS — E55.9 VITAMIN D DEFICIENCY: ICD-10-CM

## 2023-03-27 DIAGNOSIS — I27.82 CHRONIC PULMONARY EMBOLISM, UNSPECIFIED PULMONARY EMBOLISM TYPE, UNSPECIFIED WHETHER ACUTE COR PULMONALE PRESENT: ICD-10-CM

## 2023-03-27 DIAGNOSIS — R73.03 PREDIABETES: ICD-10-CM

## 2023-03-27 DIAGNOSIS — K59.04 CHRONIC IDIOPATHIC CONSTIPATION: Primary | ICD-10-CM

## 2023-03-27 DIAGNOSIS — R52 GENERALIZED PAIN: ICD-10-CM

## 2023-03-27 DIAGNOSIS — R11.0 NAUSEA: ICD-10-CM

## 2023-03-27 DIAGNOSIS — E87.6 HYPOKALEMIA: ICD-10-CM

## 2023-03-27 DIAGNOSIS — K59.04 CHRONIC IDIOPATHIC CONSTIPATION: ICD-10-CM

## 2023-03-27 DIAGNOSIS — B37.0 ORAL CANDIDIASIS: ICD-10-CM

## 2023-03-27 PROCEDURE — 99214 PR OFFICE/OUTPT VISIT, EST, LEVL IV, 30-39 MIN: ICD-10-PCS | Mod: S$PBB,,,

## 2023-03-27 PROCEDURE — 3078F DIAST BP <80 MM HG: CPT | Mod: CPTII,,,

## 2023-03-27 PROCEDURE — 3074F PR MOST RECENT SYSTOLIC BLOOD PRESSURE < 130 MM HG: ICD-10-PCS | Mod: CPTII,,,

## 2023-03-27 PROCEDURE — 3078F PR MOST RECENT DIASTOLIC BLOOD PRESSURE < 80 MM HG: ICD-10-PCS | Mod: CPTII,,,

## 2023-03-27 PROCEDURE — 3008F BODY MASS INDEX DOCD: CPT | Mod: CPTII,,,

## 2023-03-27 PROCEDURE — 3008F PR BODY MASS INDEX (BMI) DOCUMENTED: ICD-10-PCS | Mod: CPTII,,,

## 2023-03-27 PROCEDURE — 1159F MED LIST DOCD IN RCRD: CPT | Mod: CPTII,,,

## 2023-03-27 PROCEDURE — 99215 OFFICE O/P EST HI 40 MIN: CPT | Mod: PBBFAC

## 2023-03-27 PROCEDURE — 1160F PR REVIEW ALL MEDS BY PRESCRIBER/CLIN PHARMACIST DOCUMENTED: ICD-10-PCS | Mod: CPTII,,,

## 2023-03-27 PROCEDURE — 99214 OFFICE O/P EST MOD 30 MIN: CPT | Mod: S$PBB,,,

## 2023-03-27 PROCEDURE — 1159F PR MEDICATION LIST DOCUMENTED IN MEDICAL RECORD: ICD-10-PCS | Mod: CPTII,,,

## 2023-03-27 PROCEDURE — 74019 RADEX ABDOMEN 2 VIEWS: CPT | Mod: TC

## 2023-03-27 PROCEDURE — 3074F SYST BP LT 130 MM HG: CPT | Mod: CPTII,,,

## 2023-03-27 PROCEDURE — 1160F RVW MEDS BY RX/DR IN RCRD: CPT | Mod: CPTII,,,

## 2023-03-27 RX ORDER — METFORMIN HYDROCHLORIDE 500 MG/1
TABLET, EXTENDED RELEASE ORAL
Qty: 90 TABLET | Refills: 1 | Status: SHIPPED | OUTPATIENT
Start: 2023-03-27 | End: 2023-07-25 | Stop reason: SDUPTHER

## 2023-03-27 RX ORDER — LACTULOSE 10 G/15ML
20 SOLUTION ORAL 3 TIMES DAILY PRN
Qty: 480 ML | Refills: 1 | Status: SHIPPED | OUTPATIENT
Start: 2023-03-27

## 2023-03-27 RX ORDER — METHOCARBAMOL 500 MG/1
500 TABLET, FILM COATED ORAL 4 TIMES DAILY PRN
Qty: 90 TABLET | Refills: 1 | Status: SHIPPED | OUTPATIENT
Start: 2023-03-27 | End: 2023-10-19 | Stop reason: SDUPTHER

## 2023-03-27 RX ORDER — PANTOPRAZOLE SODIUM 40 MG/1
40 TABLET, DELAYED RELEASE ORAL DAILY
Qty: 90 TABLET | Refills: 1 | Status: SHIPPED | OUTPATIENT
Start: 2023-03-27 | End: 2023-10-24 | Stop reason: SDUPTHER

## 2023-03-27 RX ORDER — ALBUTEROL SULFATE 90 UG/1
AEROSOL, METERED RESPIRATORY (INHALATION)
Qty: 18 G | Refills: 1 | Status: SHIPPED | OUTPATIENT
Start: 2023-03-27 | End: 2023-05-10 | Stop reason: SDUPTHER

## 2023-03-27 RX ORDER — DILTIAZEM HYDROCHLORIDE 120 MG/1
120 CAPSULE, COATED, EXTENDED RELEASE ORAL DAILY
Qty: 90 CAPSULE | Refills: 1 | Status: SHIPPED | OUTPATIENT
Start: 2023-03-27 | End: 2023-09-27 | Stop reason: SDUPTHER

## 2023-03-27 RX ORDER — FUROSEMIDE 20 MG/1
20 TABLET ORAL DAILY
Qty: 90 TABLET | Refills: 1 | Status: SHIPPED | OUTPATIENT
Start: 2023-03-27 | End: 2023-09-14 | Stop reason: SDUPTHER

## 2023-03-27 RX ORDER — METOCLOPRAMIDE 5 MG/1
5 TABLET ORAL 4 TIMES DAILY
Qty: 180 TABLET | Refills: 1 | Status: SHIPPED | OUTPATIENT
Start: 2023-03-27 | End: 2023-10-24 | Stop reason: SDUPTHER

## 2023-03-27 RX ORDER — TOPIRAMATE 100 MG/1
TABLET, FILM COATED ORAL
COMMUNITY
End: 2023-03-27

## 2023-03-27 RX ORDER — ONDANSETRON HYDROCHLORIDE 8 MG/1
8 TABLET, FILM COATED ORAL EVERY 8 HOURS PRN
Qty: 60 TABLET | Refills: 1 | Status: SHIPPED | OUTPATIENT
Start: 2023-03-27

## 2023-03-27 RX ORDER — GABAPENTIN 800 MG/1
TABLET ORAL
Qty: 270 TABLET | Refills: 1 | Status: SHIPPED | OUTPATIENT
Start: 2023-03-27 | End: 2023-10-10 | Stop reason: SDUPTHER

## 2023-03-27 RX ORDER — GABAPENTIN 600 MG/1
1 TABLET ORAL
COMMUNITY
End: 2023-03-27

## 2023-03-27 NOTE — PROGRESS NOTES
PATIENT NAME: Cindy Garcia  : 1985  DATE: 3/27/23  MRN: [id number]          Reason for Visit/Chief Complaint   Constipation, Follow-up, and Labs Only       History of Present Illness (HPI)     Cindy Garcia is a 37 y.o. Black female presenting in clinic today for Constipation, Follow-up, and Labs Only. PMH PE, optic neuritis, peripheral neuropathy, HERB, Meniere's, TMJ, cluster headaches, GERD, HTN, diverticulosis, DDD w/ herniated disc, Grave's disease, sickle cell trait, TIA, and fibromyalgia. She is followed by [facility] neuro clinic for headaches, Dr. Ramsay (ENT) in [place], Dr. Sharon Casiano (GYN), Dr. Chinmay Benitez (GI) and Dr. Vignesh Jaquez, and [facility] in [place] for multiple PE's, palpitations, and SOB. She was previously followed by Dr. Andersen (rheumatology) and a rheumatology clinic in [place], and Dr. Marciano Ramos (endocrinology). She was seen by Dr. Garcia (rheumatology) on 10/18/2022, his recommendation was for patient to perform low intensity aerobic exercises. No f/u indicated.     She states she had not had a regular bowel movement in 3 weeks. She states she experienced diarrhea approximately one week ago. She endorses vomiting x2 one day ago. She also has been burping. She endorses passing gas. She has taken miralax, stool softerners without relief. She reports having intermittent thrush in mouth. She has previously been treated with nystatin swish and spit. She states she is experiencing hair loss in the front, middle of head. No previous treatment.    Denies smoking, drinking, or illicit drug use. Denies chest pain, shortness of breath, cough, headache, dizziness, weakness, abdominal pain, nausea, vomiting, diarrhea, constipation, dysuria, depression, anxiety, SI, and HI.       Review of Systems     Review of Systems   Constitutional:  Negative for activity change and unexpected weight change.   HENT:  Negative for hearing loss, rhinorrhea and trouble swallowing.     Eyes:  Negative for discharge and visual disturbance.   Respiratory:  Negative for chest tightness and wheezing.    Cardiovascular:  Negative for chest pain and palpitations.   Gastrointestinal:  Negative for blood in stool, constipation, diarrhea and vomiting.   Endocrine: Positive for polyuria. Negative for polydipsia.   Genitourinary:  Negative for difficulty urinating, dysuria, hematuria and menstrual problem.   Musculoskeletal:  Positive for arthralgias. Negative for joint swelling and neck pain.   Neurological:  Positive for weakness and headaches.   Psychiatric/Behavioral:  Negative for confusion and dysphoric mood.      Medical / Social / Family History     Past Medical History:   Diagnosis Date    Carpal tunnel syndrome     Cluster headaches     Cubital tunnel syndrome     Diabetes mellitus     Digestive disorder     Diverticulosis     Dysphagia     Fibromyalgia     Fluid retention     GERD (gastroesophageal reflux disease)     Graves disease     Graves' disease     Hypertension     Meniere disease     Neuropathy     PE (pulmonary thromboembolism)     Sciatic nerve pain     Sleep apnea     TMJ (dislocation of temporomandibular joint)          Past Surgical History:   Procedure Laterality Date    ADENOIDECTOMY N/A     CARPAL TUNNEL RELEASE Left 5/19/2022    Procedure: RELEASE, CARPAL TUNNEL;  Surgeon: Eben Bolanos MD;  Location: [facility];  Service: Orthopedics;  Laterality: Left;    CHOLECYSTECTOMY N/A     laryngoscopy and other tracheoscopy N/A 12/23/2014    ULNAR TUNNEL RELEASE Left 5/19/2022    Procedure: RELEASE, CUBITAL TUNNEL;  Surgeon: Eben Bolanos MD;  Location: [facility];  Service: Orthopedics;  Laterality: Left;         Social History  Cindy Garcia's  reports that she has never smoked. She has never used smokeless tobacco. She reports that she does not currently use alcohol. She reports that she does not use drugs.    Family History  Cindy Garcia's family history includes  Arthritis in her mother; Asthma in her daughter; COPD in her mother; Cancer in her paternal aunt; Diabetes in her mother; Hypertension in her mother; Kidney disease in her mother; Stroke in her paternal aunt; Vision loss in her mother.    Medications and Allergies     Medications  Current Outpatient Medications   Medication Instructions    (Magic mouthwash) 1:1:1 diphenhydrAMINE(Benadryl) 12.5mg/5ml liq, aluminum & magnesium hydroxide-simethicone (Maalox), LIDOcaine viscous 2% 15 mLs, Swish & Spit, Every 4 hours PRN, for mouth sores    albuterol (PROVENTIL/VENTOLIN HFA) 90 mcg/actuation inhaler 2 puffs, Inhale, every 6 hr, PRN as needed for wheezing, # 8.5 g, 0 Refill(s), Start Date: 09/01/22 1:44:00 CDT    apixaban (ELIQUIS) 5 mg, Oral, Daily    busPIRone (BUSPAR) 5 mg, Oral, 2 times daily    cetirizine (ZYRTEC) 10 MG tablet 11 tablets, Oral, Daily    diltiaZEM (CARDIZEM CD) 120 mg, Oral, Daily    EMGALITY  mg, Subcutaneous, Every 28 days    FEROSUL 325 mg (65 mg iron) Tab tablet 1 tablet, Oral, Daily    furosemide (LASIX) 20 mg, Oral, Daily    gabapentin (NEURONTIN) 800 MG tablet ONE TABLET (BY MOUTH) THREE TIMES A DAY    hydrOXYzine HCL (ATARAX) 25 mg, Oral, 3 times daily PRN    lactulose (CHRONULAC) 20 g, Oral, 3 times daily PRN    lasmiditan (REYVOW) tablet 100 mg TAKE 2 TABLETS (BY MOUTH) EVERY DAY AS NEEDED FOR SEVERE HEADACHES ONLY. ALLOW AT LEAST 24 HOURS BETWEEN DOSES.    linaCLOtide (LINZESS) 72 mcg, Oral, Before breakfast    metFORMIN (GLUCOPHAGE-XR) 500 MG ER 24hr tablet TAKE 2 TABLETS (BY MOUTH) EVERY MORNING    methocarbamoL (ROBAXIN) 500 mg, Oral, 4 times daily PRN    metoclopramide HCl (REGLAN) 5 mg, Oral, 4 times daily    NURTEC 75 mg, Oral, Daily PRN    ondansetron (ZOFRAN) 8 mg, Oral, Every 8 hours PRN    pantoprazole (PROTONIX) 40 mg, Oral, Daily    potassium chloride (KLOR-CON) 10 MEQ TbSR 10 mEq, Oral, 2 times daily    topiramate (TOPAMAX) 100 mg, Oral, 2 times daily    topiramate  "(TOPAMAX) 25 mg, Oral, 2 times daily    UBRELVY 100 mg, Oral, Daily PRN       Allergies  Review of patient's allergies indicates:  No Known Allergies    Physical Examination   Visit Vitals  /69 (BP Location: Right arm, Patient Position: Sitting, BP Method: Large (Automatic))   Pulse 97   Temp 98 °F (36.7 °C) (Oral)   Resp 18   Ht 5' 3" (1.6 m)   Wt 96.1 kg (211 lb 13.8 oz)   LMP  (LMP Unknown)   SpO2 96%   BMI 37.53 kg/m²     Physical Exam  Vitals reviewed.   Constitutional:       Appearance: Normal appearance. She is normal weight.   HENT:      Head: Normocephalic and atraumatic. Hair is abnormal.        Right Ear: External ear normal.      Left Ear: External ear normal.      Nose: Nose normal.      Mouth/Throat:      Mouth: Mucous membranes are moist. Oral lesions present.      Pharynx: Oropharynx is clear.      Comments: White lesion on left side of mouth on cheek.  Eyes:      Extraocular Movements: Extraocular movements intact.      Conjunctiva/sclera: Conjunctivae normal.      Pupils: Pupils are equal, round, and reactive to light.   Cardiovascular:      Rate and Rhythm: Normal rate and regular rhythm.      Pulses: Normal pulses.      Heart sounds: Normal heart sounds.   Pulmonary:      Effort: Pulmonary effort is normal.      Breath sounds: Normal breath sounds.   Abdominal:      General: Bowel sounds are normal.      Palpations: Abdomen is soft.   Musculoskeletal:         General: Normal range of motion.      Cervical back: Normal range of motion and neck supple.   Skin:     General: Skin is warm and dry.      Capillary Refill: Capillary refill takes less than 2 seconds.   Neurological:      General: No focal deficit present.      Mental Status: She is alert and oriented to person, place, and time.   Psychiatric:         Mood and Affect: Mood normal.         Behavior: Behavior normal.         Thought Content: Thought content normal.         Judgment: Judgment normal.         Results     Lab Results "   Component Value Date     WBC 5.0 12/12/2022     RBC 4.20 12/12/2022     HGB 12.2 12/12/2022     HCT 37.2 12/12/2022     MCV 88.6 12/12/2022     MCH 29.0 12/12/2022     MCHC 32.8 (L) 12/12/2022     RDW 13.4 12/12/2022      12/12/2022     MPV 11.5 (H) 12/12/2022            Lab Results   Component Value Date      12/12/2022     K 3.7 12/12/2022     CO2 22 12/12/2022     BUN 7.3 12/12/2022     CREATININE 1.10 (H) 12/12/2022     CALCIUM 9.5 12/12/2022     ALBUMIN 4.0 12/12/2022     BILITOT 0.6 12/12/2022     ALKPHOS 95 12/12/2022     AST 14 12/12/2022     ALT 16 12/12/2022     EGFRIFAFRICA >60 05/18/2022     EGFRNONAA 56 (L) 10/19/2021            Lab Results   Component Value Date     TSH 0.5218 12/12/2022            Lab Results   Component Value Date     CHOL 159 08/08/2022     HDL 38 08/08/2022     LDL 96.00 08/08/2022     TRIG 125 08/08/2022            Lab Results   Component Value Date     COLORUA Light-Yellow 12/12/2022     SGUA 1.011 12/12/2022     PROTEINUA Negative 12/12/2022     GLUCOSEUA Normal 12/12/2022     BILIRUBINUA Negative 12/12/2022     BLOODUA Negative 12/12/2022     RBCUA 0-5 12/12/2022     BACTERIA None Seen 12/12/2022     NITRITE Negative 07/09/2020     LEUKOCYTESUR Negative 12/12/2022     UROBILINOGEN Normal 12/12/2022           Lab Results   Component Value Date     WXCJICUK64XV 16.1 (L) 12/12/2022     FOLATE 7.3 08/08/2022            Lab Results   Component Value Date     HIV Nonreactive 08/08/2022     HEPAIGM Nonreactive 08/08/2022     HEPBCOREM Nonreactive 08/08/2022     HEPCAB Nonreactive 08/08/2022        Assessment and Plan (including Health Maintenance)     1. Chronic idiopathic constipation  - X-Ray Abdomen Flat And Erect; Future  - lactulose (CHRONULAC) 20 gram/30 mL Soln; Take 30 mLs (20 g total) by mouth 3 (three) times daily as needed (constipation).  Dispense: 480 mL; Refill: 1  - linaCLOtide (LINZESS) 72 mcg Cap capsule; Take 1 capsule (72 mcg total) by mouth before  breakfast.  Dispense: 90 capsule; Refill: 1  Rx lactulose.  Rx linaclotide.  Ok to take stool softener daily.  Educated on high fiber diet and exercise.  Drink at least 64 ozs of water daily.  Eat hot breakfast q am.     2. Chronic pulmonary embolism, unspecified pulmonary embolism type, unspecified whether acute cor pulmonale present  - apixaban (ELIQUIS) 5 mg Tab; Take 1 tablet (5 mg total) by mouth once daily.  Dispense: 90 tablet; Refill: 1  - CBC Auto Differential; Future  - Ferritin; Future  ED precautions.  Continue appts with [facility].    3. Generalized pain  - gabapentin (NEURONTIN) 800 MG tablet; ONE TABLET (BY MOUTH) THREE TIMES A DAY  Dispense: 270 tablet; Refill: 1  - methocarbamoL (ROBAXIN) 500 MG Tab; Take 1 tablet (500 mg total) by mouth 4 (four) times daily as needed (muscle spasms).  Dispense: 90 tablet; Refill: 1    4. Gastroesophageal reflux disease without esophagitis  - pantoprazole (PROTONIX) 40 MG tablet; Take 1 tablet (40 mg total) by mouth once daily.  Dispense: 90 tablet; Refill: 1  Avoid spicy, acidic, fried food and alcohol.    Eat 2-3 hours before bed.   Avoid tight fitting clothes and chew food thoroughly.    Reduce caffeine intake, avoid soda.    Take pantoprazole as prescribed.      5. Oral candidiasis  - (Magic mouthwash) 1:1:1 diphenhydrAMINE(Benadryl) 12.5mg/5ml liq, aluminum & magnesium hydroxide-simethicone (Maalox), LIDOcaine viscous 2%; Swish and spit 15 mLs every 4 (four) hours as needed (thrush). for mouth sores  Dispense: 360 mL; Refill: 0    6. BMI 37.0-37.9, adult  Body mass index is 37.53 kg/m².  Goal BMI <30.  Aerobic exercise 150 minutes per week.  Avoid soda, simple sugars, sweets, excessive rice, pasta, potatoes or bread.   Choose brown options when available and portion control.  Limit fast foods and fried foods.   Choose complex carbs in moderation (ex: green, leafy vegetables, beans, oatmeal).  Eat plenty of fresh fruits and vegetables with lean meats daily.   Consider  permanent healthy lifestyle changes.     7. Prediabetes  - Comprehensive Metabolic Panel; Future  - Hemoglobin A1C; Future  - Lipid Panel; Future  - Microalbumin/Creatinine Ratio, Urine; Future  - Urinalysis, Reflex to Urine Culture; Future    8. Nausea  - metoclopramide HCl (REGLAN) 5 MG tablet; Take 1 tablet (5 mg total) by mouth 4 (four) times daily.  Dispense: 180 tablet; Refill: 1  - ondansetron (ZOFRAN) 8 MG tablet; Take 1 tablet (8 mg total) by mouth every 8 (eight) hours as needed for Nausea.  Dispense: 60 tablet; Refill: 1    9. Vitamin D deficiency  - Vitamin D; Future    10. Hypokalemia  - Comprehensive Metabolic Panel; Future      Health Maintenance Due   Topic Date Due    Cervical Cancer Screening  Never done    COVID-19 Vaccine (1) Never done     Tests to Keep You Healthy    Cervical Cancer Screening: DUE      Health Maintenance Topics with due status: Not Due       Topic Last Completion Date    Hemoglobin A1c (Prediabetes) 03/27/2023       Future Appointments   Date Time Provider Department Center   4/5/2023 12:15 PM JAIMIE Sutton [facility]    5/8/2023  9:30 AM CHRISTIE Ashton [facility]        Follow up in about 1 week (around 4/3/2023) for Lab review, Follow up, Med check, Virtual Visit.  RTC PRN.  Labs within a week of visit.        Signature:        JAIMIE Sutton  [facility]  [address] [facility] [id number]    Date of encounter: 3/27/23

## 2023-04-05 ENCOUNTER — OFFICE VISIT (OUTPATIENT)
Dept: INTERNAL MEDICINE | Facility: CLINIC | Age: 38
End: 2023-04-05
Payer: MEDICAID

## 2023-04-05 DIAGNOSIS — K59.04 CHRONIC IDIOPATHIC CONSTIPATION: ICD-10-CM

## 2023-04-05 DIAGNOSIS — R73.03 PREDIABETES: ICD-10-CM

## 2023-04-05 DIAGNOSIS — N34.2 INFECTIVE URETHRITIS: ICD-10-CM

## 2023-04-05 DIAGNOSIS — E55.9 VITAMIN D DEFICIENCY: Primary | ICD-10-CM

## 2023-04-05 DIAGNOSIS — D50.8 IRON DEFICIENCY ANEMIA SECONDARY TO INADEQUATE DIETARY IRON INTAKE: ICD-10-CM

## 2023-04-05 PROCEDURE — 1160F RVW MEDS BY RX/DR IN RCRD: CPT | Mod: CPTII,95,,

## 2023-04-05 PROCEDURE — 1160F PR REVIEW ALL MEDS BY PRESCRIBER/CLIN PHARMACIST DOCUMENTED: ICD-10-PCS | Mod: CPTII,95,,

## 2023-04-05 PROCEDURE — 1159F PR MEDICATION LIST DOCUMENTED IN MEDICAL RECORD: ICD-10-PCS | Mod: CPTII,95,,

## 2023-04-05 PROCEDURE — 99214 OFFICE O/P EST MOD 30 MIN: CPT | Mod: 95,,,

## 2023-04-05 PROCEDURE — 1159F MED LIST DOCD IN RCRD: CPT | Mod: CPTII,95,,

## 2023-04-05 PROCEDURE — 99214 PR OFFICE/OUTPT VISIT, EST, LEVL IV, 30-39 MIN: ICD-10-PCS | Mod: 95,,,

## 2023-04-05 RX ORDER — CIPROFLOXACIN 500 MG/1
500 TABLET ORAL DAILY
Qty: 3 TABLET | Refills: 0 | Status: SHIPPED | OUTPATIENT
Start: 2023-04-05 | End: 2023-04-08

## 2023-04-05 RX ORDER — ASPIRIN 325 MG
50000 TABLET, DELAYED RELEASE (ENTERIC COATED) ORAL
Qty: 32 CAPSULE | Refills: 0 | Status: SHIPPED | OUTPATIENT
Start: 2023-04-06 | End: 2023-10-24 | Stop reason: SDUPTHER

## 2023-04-05 RX ORDER — LACTULOSE 10 G/15ML
SOLUTION ORAL; RECTAL
COMMUNITY
Start: 2023-04-04 | End: 2023-04-05 | Stop reason: SDUPTHER

## 2023-04-05 NOTE — PROGRESS NOTES
VISIT DATE: 23    PATIENT NAME: Cindy Garcia  : 1985  MRN: [id number]     The patient location is: [place], [place]  The chief complaint leading to consultation is: Lab review    Visit type: audiovisual    Face to Face time with patient: 10 minutes  35 minutes of total time spent on the encounter, which includes face to face time and non-face to face time preparing to see the patient (eg, review of tests), Obtaining and/or reviewing separately obtained history, Documenting clinical information in the electronic or other health record, Independently interpreting results (not separately reported) and communicating results to the patient/family/caregiver, or Care coordination (not separately reported).     Each patient to whom he or she provides medical services by telemedicine is:  (1) informed of the relationship between the physician and patient and the respective role of any other health care provider with respect to management of the patient; and (2) notified that he or she may decline to receive medical services by telemedicine and may withdraw from such care at any time.    Reason for visit / Chief Complaint:  Follow-up, Labs Only, and Constipation       History of Present Illness (HPI):  Cindy Garcia is a 37 y.o. Black female presenting virtually by audio/visual for Follow-up, Labs Only, and Constipation. PMH PE, optic neuritis, peripheral neuropathy, HERB, Meniere's, TMJ, cluster headaches, GERD, HTN, diverticulosis, DDD w/ herniated disc, Grave's disease, sickle cell trait, TIA, and fibromyalgia. She is followed by [facility] neuro clinic for headaches, Dr. Ramsay (ENT) in [place], Dr. Sharon Casiano (GYN), Dr. Chinmay Benitez (GI) and Dr. Vignesh Jaquez, and [facility] in [place] for multiple PE's, palpitations, and SOB. She was previously followed by Dr. Andersen (rheumatology) and a rheumatology clinic in [place], and Dr. Marciano Ramos (endocrinology). She was seen by Dr. Garcia  (rheumatology) on 10/18/2022, his recommendation was for patient to perform low intensity aerobic exercises. No f/u indicated.     All pertinent labs dated 3/27/2023 and diagnotic tests reviewed and discussed with patient.     She voices compliance with taking lactulose and linzess and now reports regular bowel movements.     Denies smoking, drinking, or illicit drug use. Denies chest pain, shortness of breath, cough, headache, dizziness, weakness, abdominal pain, nausea, vomiting, diarrhea, constipation, dysuria, depression, anxiety, SI, and HI.    Cervical Cancer Screening - Next appt is in 9/2022, she will send records.   Breast Cancer Screening - Deferred due to age.  Colon Cancer Screening - Deferred due to age.  Vaccinations: Declines vaccines.   Eye Exam - Several years. List of local eye doctors provided to patient.  Dental Exam - Several years. List of local dentists provided to patient.       Review of Systems     Review of Systems   Constitutional:  Negative for activity change and unexpected weight change.   HENT:  Negative for hearing loss, rhinorrhea and trouble swallowing.    Eyes:  Negative for discharge and visual disturbance.   Respiratory:  Negative for chest tightness and wheezing.    Cardiovascular:  Negative for chest pain and palpitations.   Gastrointestinal:  Negative for blood in stool, constipation, diarrhea and vomiting.   Endocrine: Negative for polydipsia and polyuria.   Genitourinary:  Negative for difficulty urinating, dysuria and hematuria.   Musculoskeletal:  Negative for arthralgias, joint swelling and neck pain.   Skin: Negative.    Neurological:  Negative for weakness and headaches.   Psychiatric/Behavioral:  Negative for confusion and dysphoric mood.      Medical / Social / Family History     Past Medical History:   Diagnosis Date    Carpal tunnel syndrome     Cluster headaches     Cubital tunnel syndrome     Diabetes mellitus     Digestive disorder     Diverticulosis      Dysphagia     Fibromyalgia     Fluid retention     GERD (gastroesophageal reflux disease)     Graves disease     Graves' disease     Hypertension     Meniere disease     Neuropathy     PE (pulmonary thromboembolism)     Sciatic nerve pain     Sleep apnea     TMJ (dislocation of temporomandibular joint)          Past Surgical History:   Procedure Laterality Date    ADENOIDECTOMY N/A     CARPAL TUNNEL RELEASE Left 5/19/2022    Procedure: RELEASE, CARPAL TUNNEL;  Surgeon: Eben Bolanos MD;  Location: [facility];  Service: Orthopedics;  Laterality: Left;    CHOLECYSTECTOMY N/A     laryngoscopy and other tracheoscopy N/A 12/23/2014    ULNAR TUNNEL RELEASE Left 5/19/2022    Procedure: RELEASE, CUBITAL TUNNEL;  Surgeon: Eben Bolanos MD;  Location: [facility];  Service: Orthopedics;  Laterality: Left;     Social History  Cindy Garcia's  reports that she has never smoked. She has never used smokeless tobacco. She reports that she does not currently use alcohol. She reports that she does not use drugs.    Family History  Cindy Garcia's family history includes Arthritis in her mother; Asthma in her daughter; COPD in her mother; Cancer in her paternal aunt; Diabetes in her mother; Hypertension in her mother; Kidney disease in her mother; Stroke in her paternal aunt; Vision loss in her mother.    Medications and Allergies     Medications  Current Outpatient Medications   Medication Instructions    (Magic mouthwash) 1:1:1 diphenhydrAMINE(Benadryl) 12.5mg/5ml liq, aluminum & magnesium hydroxide-simethicone (Maalox), LIDOcaine viscous 2% 15 mLs, Swish & Spit, Every 4 hours PRN, for mouth sores    albuterol (PROVENTIL/VENTOLIN HFA) 90 mcg/actuation inhaler 2 puffs, Inhale, every 6 hr, PRN as needed for wheezing, # 8.5 g, 0 Refill(s), Start Date: 09/01/22 1:44:00 CDT    apixaban (ELIQUIS) 5 mg, Oral, Daily    busPIRone (BUSPAR) 5 mg, Oral, 2 times daily    cetirizine (ZYRTEC) 10 MG tablet 11 tablets, Oral, Daily     [START ON 4/6/2023] cholecalciferol (vitamin D3) 50,000 Units, Oral, Twice weekly    diltiaZEM (CARDIZEM CD) 120 mg, Oral, Daily    EMGALITY  mg, Subcutaneous, Every 28 days    FEROSUL 325 mg (65 mg iron) Tab tablet 1 tablet, Oral, Daily    furosemide (LASIX) 20 mg, Oral, Daily    gabapentin (NEURONTIN) 800 MG tablet ONE TABLET (BY MOUTH) THREE TIMES A DAY    hydrOXYzine HCL (ATARAX) 25 mg, Oral, 3 times daily PRN    lactulose (CHRONULAC) 20 g, Oral, 3 times daily PRN    lasmiditan (REYVOW) tablet 100 mg TAKE 2 TABLETS (BY MOUTH) EVERY DAY AS NEEDED FOR SEVERE HEADACHES ONLY. ALLOW AT LEAST 24 HOURS BETWEEN DOSES.    linaCLOtide (LINZESS) 72 mcg, Oral, Before breakfast    metFORMIN (GLUCOPHAGE-XR) 500 MG ER 24hr tablet TAKE 2 TABLETS (BY MOUTH) EVERY MORNING    methocarbamoL (ROBAXIN) 500 mg, Oral, 4 times daily PRN    metoclopramide HCl (REGLAN) 5 mg, Oral, 4 times daily    NURTEC 75 mg, Oral, Daily PRN    ondansetron (ZOFRAN) 8 mg, Oral, Every 8 hours PRN    pantoprazole (PROTONIX) 40 mg, Oral, Daily    potassium chloride (KLOR-CON) 10 MEQ TbSR 10 mEq, Oral, 2 times daily    topiramate (TOPAMAX) 100 mg, Oral, 2 times daily    topiramate (TOPAMAX) 25 mg, Oral, 2 times daily    UBRELVY 100 mg, Oral, Daily PRN        Allergies  Review of patient's allergies indicates:  No Known Allergies    Physical Examination   No vitals due to virtual visit.  Physical Exam  HENT:      Head: Normocephalic.      Nose: Nose normal.   Eyes:      Extraocular Movements: Extraocular movements intact.      Conjunctiva/sclera: Conjunctivae normal.      Pupils: Pupils are equal, round, and reactive to light.   Pulmonary:      Effort: Pulmonary effort is normal.   Musculoskeletal:      Cervical back: Normal range of motion.   Neurological:      General: No focal deficit present.      Mental Status: She is alert and oriented to person, place, and time.   Psychiatric:         Mood and Affect: Mood normal.         Behavior: Behavior  normal.         Thought Content: Thought content normal.         Judgment: Judgment normal.         Results     Lab Results   Component Value Date    WBC 5.3 03/27/2023    RBC 4.51 03/27/2023    HGB 13.1 03/27/2023    HCT 40.2 03/27/2023    MCV 89.1 03/27/2023    MCH 29.0 03/27/2023    MCHC 32.6 (L) 03/27/2023    RDW 13.0 03/27/2023     03/27/2023    MPV 12.1 (H) 03/27/2023     Lab Results   Component Value Date     03/27/2023    K 3.9 03/27/2023    CO2 24 03/27/2023    BUN 8.5 03/27/2023    CREATININE 1.05 (H) 03/27/2023    CALCIUM 10.0 03/27/2023    ALBUMIN 4.1 03/27/2023    BILITOT 0.6 03/27/2023    ALKPHOS 101 03/27/2023    AST 11 03/27/2023    ALT 13 03/27/2023    EGFRIFAFRICA >60 05/18/2022    EGFRNONAA 56 (L) 10/19/2021     Lab Results   Component Value Date    TSH 0.5218 12/12/2022     Lab Results   Component Value Date    CHOL 177 03/27/2023    HDL 43 03/27/2023    .00 03/27/2023    TRIG 103 03/27/2023     Lab Results   Component Value Date    COLORUA Light-Yellow 03/27/2023    SGUA 1.018 03/27/2023    PROTEINUA Negative 03/27/2023    GLUCOSEUA Normal 03/27/2023    BILIRUBINUA Negative 03/27/2023    BLOODUA Negative 03/27/2023    RBCUA 0-5 03/27/2023    BACTERIA Trace (A) 03/27/2023    NITRITE Negative 07/09/2020    LEUKOCYTESUR Negative 03/27/2023    UROBILINOGEN Normal 03/27/2023      No results found for: MICROALBUR, [id number]  Lab Results   Component Value Date    UMQZNUVO32SY 16.3 (L) 03/27/2023    FOLATE 7.3 08/08/2022     Lab Results   Component Value Date    HIV Nonreactive 08/08/2022    HEPAIGM Nonreactive 08/08/2022    HEPBCOREM Nonreactive 08/08/2022    HEPCAB Nonreactive 08/08/2022     Assessment and Plan (including Health Maintenance)   1. Vitamin D deficiency  - cholecalciferol, vitamin D3, 1,250 mcg (50,000 unit) capsule; Take 1 capsule (50,000 Units total) by mouth twice a week.  Dispense: 32 capsule; Refill: 0  - Vitamin D; Future  Lab Results   Component Value Date     [id number] 16.3 (L) 03/27/2023     Educated on increasing foods high in Vitamin D such as fish oil, cod liver oil, salmon, milk fortified with vitamin D.  RX Vitamin D3 13834 IU twice weekly x 16 weeks.  Complete entire 16 weeks of Vitamin D prescription.  After completion of prescription (16 weeks/4 months), begin taking Vitamin D 5000 I.U. tablets daily (purchase over the counter).  Repeat Vitamin D level as ordered.     2. Chronic idiopathic constipation  Continue linzess and lactulose as prescribed.  High fiber diet.    3. Prediabetes  - Comprehensive Metabolic Panel; Future  - Hemoglobin A1C; Future  - Microalbumin/Creatinine Ratio, Urine; Future  - Urinalysis, Reflex to Urine Culture; Future    4. Iron deficiency anemia secondary to inadequate dietary iron intake  - CBC Auto Differential; Future  - Ferritin; Future  - Iron and TIBC; Future    5. Infective urethritis  - Ciprofloxacin HCl (CIPRO) 500 MG tablet; Take 1 tablet (500 mg) by mouth daily for 3 days. Dispense: 3; Refill: 0  Start antibiotics as prescribed.    Complete the full course of the medication.   Report any continuing signs such as nausea/vomiting, visible blood in urine, increased low back or flank pain, worsening burning upon urination after antibiotic completion or fever.   Drink plenty of water.   Avoid soda or carbonated beverages.    Urinate frequently; do not hold urine for extended periods of time.   Wear cotton underwear, avoid tight fitting pants.   Use OTC AZO or pyridium for relief of urinary spasms.   Women: wipe front to back, urinate after sexual intercourse, and avoid scented or irritating feminine products, bubble baths, body washes, bath bombs, and soaps.      Health Maintenance Due   Topic Date Due    Cervical Cancer Screening  Never done    COVID-19 Vaccine (1) Never done     Tests to Keep You Healthy    Cervical Cancer Screening: DUE        Health Maintenance Topics with due status: Not Due       Topic Last Completion  Date    Hemoglobin A1c (Prediabetes) 03/27/2023       Future Appointments   Date Time Provider Department Center   5/8/2023  9:30 AM Allison Butts ANP [facility]        Follow up in about 6 months (around 10/5/2023) for F2F, Follow up, Med check, Lab review.  RTC PRN.  Labs within a week of visit.             Signature:  JAIMIE Sutton  [facility]  [address] [id number]    Date of encounter: 4/5/23

## 2023-04-10 ENCOUNTER — PATIENT MESSAGE (OUTPATIENT)
Dept: INTERNAL MEDICINE | Facility: CLINIC | Age: 38
End: 2023-04-10
Payer: MEDICAID

## 2023-04-10 DIAGNOSIS — F51.01 PRIMARY INSOMNIA: Primary | ICD-10-CM

## 2023-04-12 DIAGNOSIS — G43.E09 CHRONIC MIGRAINE WITH AURA: ICD-10-CM

## 2023-04-12 RX ORDER — MIRTAZAPINE 7.5 MG/1
7.5 TABLET, FILM COATED ORAL NIGHTLY
Qty: 30 TABLET | Refills: 1 | Status: SHIPPED | OUTPATIENT
Start: 2023-04-12 | End: 2023-06-08 | Stop reason: SDUPTHER

## 2023-04-12 RX ORDER — TOPIRAMATE 100 MG/1
100 TABLET, FILM COATED ORAL 2 TIMES DAILY
Qty: 180 TABLET | Refills: 1 | Status: SHIPPED | OUTPATIENT
Start: 2023-04-12 | End: 2023-10-24

## 2023-05-01 ENCOUNTER — PATIENT MESSAGE (OUTPATIENT)
Dept: INTERNAL MEDICINE | Facility: CLINIC | Age: 38
End: 2023-05-01
Payer: MEDICAID

## 2023-05-01 DIAGNOSIS — M79.7 FIBROMYALGIA: Primary | ICD-10-CM

## 2023-05-01 DIAGNOSIS — B37.0 ORAL CANDIDIASIS: ICD-10-CM

## 2023-05-08 ENCOUNTER — OFFICE VISIT (OUTPATIENT)
Dept: NEUROLOGY | Facility: CLINIC | Age: 38
End: 2023-05-08
Payer: MEDICAID

## 2023-05-08 DIAGNOSIS — G43.E09 CHRONIC MIGRAINE WITH AURA: Primary | ICD-10-CM

## 2023-05-08 DIAGNOSIS — G47.33 OSA ON CPAP: ICD-10-CM

## 2023-05-08 DIAGNOSIS — E66.01 CLASS 2 SEVERE OBESITY DUE TO EXCESS CALORIES WITH SERIOUS COMORBIDITY AND BODY MASS INDEX (BMI) OF 37.0 TO 37.9 IN ADULT: ICD-10-CM

## 2023-05-08 PROBLEM — E66.9 CLASS 2 OBESITY WITH BODY MASS INDEX (BMI) OF 37.0 TO 37.9 IN ADULT: Status: ACTIVE | Noted: 2023-05-08

## 2023-05-08 PROBLEM — E66.812 CLASS 2 OBESITY WITH BODY MASS INDEX (BMI) OF 37.0 TO 37.9 IN ADULT: Status: ACTIVE | Noted: 2023-05-08

## 2023-05-08 PROCEDURE — 99214 OFFICE O/P EST MOD 30 MIN: CPT | Mod: 95,,, | Performed by: NURSE PRACTITIONER

## 2023-05-08 PROCEDURE — 1160F RVW MEDS BY RX/DR IN RCRD: CPT | Mod: CPTII,95,, | Performed by: NURSE PRACTITIONER

## 2023-05-08 PROCEDURE — 1160F PR REVIEW ALL MEDS BY PRESCRIBER/CLIN PHARMACIST DOCUMENTED: ICD-10-PCS | Mod: CPTII,95,, | Performed by: NURSE PRACTITIONER

## 2023-05-08 PROCEDURE — 1159F PR MEDICATION LIST DOCUMENTED IN MEDICAL RECORD: ICD-10-PCS | Mod: CPTII,95,, | Performed by: NURSE PRACTITIONER

## 2023-05-08 PROCEDURE — 1159F MED LIST DOCD IN RCRD: CPT | Mod: CPTII,95,, | Performed by: NURSE PRACTITIONER

## 2023-05-08 PROCEDURE — 99214 PR OFFICE/OUTPT VISIT, EST, LEVL IV, 30-39 MIN: ICD-10-PCS | Mod: 95,,, | Performed by: NURSE PRACTITIONER

## 2023-05-08 RX ORDER — GALCANEZUMAB 120 MG/ML
120 INJECTION, SOLUTION SUBCUTANEOUS
Qty: 1 EACH | Refills: 11 | Status: SHIPPED | OUTPATIENT
Start: 2023-05-08

## 2023-05-08 RX ORDER — UBROGEPANT 100 MG/1
100 TABLET ORAL DAILY PRN
Qty: 16 TABLET | Refills: 2 | Status: SHIPPED | OUTPATIENT
Start: 2023-05-08

## 2023-05-08 NOTE — PROGRESS NOTES
[facility] Neurology Telemedicine Visit Note  This is a real-time audio/video visit that was performed with the originating site at patient's home and the distant site, [facility]. Verbal consent to participate in interactive audio & video visit was obtained.    I discussed with the patient regarding the nature of our telehealth visits, that:    - Our sessions are not being recorded and that personal health information is protected  - Provider would evaluate the patient and recommend diagnostics and treatments based on my assessment  - [facility] will provide follow up care in person if/when the patient needs it.     Subjective:      Patient ID: Cindy Garcia is a 37 y.o. female.    Chief Complaint: Migraine (Pt reports about the same.)    HPI  This is a 37 year old female with PMHX of PEs, HERB, TMJ issues, HTN, GERD, Fibromyalgia, Grave's Disease, Sickle Cell Trait, TIA, who was referred for medically intractable chronic migraine with visual aura with status migrainosus not responding to Topamax or Elavil. Also complaining of insomnia. Patient was last seen on 11/82022.   During that visit, Emgality 120mg/mL monthly, Ubrelvy, Lasmitidan and TPM were continued    Today, Pt states migraines improved on Emgality. Averaging 4 migraines/month. Last migraine yesterday, Ubrelvy effective as abortive therapy, but tries Reyvow first and if not effective, will try Ubrelvy. Dx w/HERB, utilizes CPAP. States she is on a low sodium diet, has improved her bedtime routine. Denies photophobia/phonophobia/nausea. Started walking daily and walks often during the day at work.    Age of Onset - childhood    Semiology - left temporal, pounding, 10/10, lasting up to 48 hrs, with nausea, photophobia, phonophobia. Associated with Left eye vision peripheral aura.     Frequency - 16 migraine headache days/month; 4 migraine/month on  Emgality    Exacerbating Factors - denied    Risk Factors -  - Family history of headache disorder? sister and daughter with migraine.  - History of Head Trauma? denied  - History of CNS infection? denied  - History of underlying mood disorder? denied  - Illicit drug use? denied  - Tobacco use? denied  - History of sleep disorder? HERB on CPAP. Still staying up the whole night for this past 1 month. Having trouble falling asleep. Stays asleep for 5-6 hours when she falls asleep.     Workup -  - MRI Brain +/- Yassine 11/24/2015 - I have reviewed the study independently and with the patient. Unremarkable  - MRI Brain w/o Yassine 3/1/2018 - I have reviewed the study independently and with the patient. Unremarkable.  - MRI Face/Orbit +/- Yassine 6/5/2018 - I have reviewed the study independently and with the patient. Unremarkable  - MRI Pituitary +/- Yassine 11/13/2019 - I have reviewed the study independently and with the patient. Unremarkable  - Lumbar Puncture:  - MRA Head:  - MRV Head:    Current ppx meds -  TPM IR 100mg BID -  Emgality 120mg/mL once per month (5/7/2021 - present) - effective  Effexor 37.5mg daily (8/4/2021 - present) - started by mental health provider    Current abortive meds -  Tylenol PRN - ineffective. Takes it daily for the six months.  Lasmiditan 200mg PRN for severe headaches (5/7/2021 - present) - partially effective  Ubrelvy 100mg PO BID PRN at onset of migraine     Prior ppx meds -  Amitriptyline 50mg qhs    Prior abortive meds -   Naproxen 500mg BID PRN  Nurtec ODT 75mg once a day PRN (5/7/2021 - 2/8/2022) - ineffective    Review of Systems   Constitutional: no fever, fatigue, weakness  Eye: no vision loss, eye redness, drainage, or pain  ENMT: no sore throat, ear pain, sinus pain/congestion, nasal congestion/drainage  Respiratory: no cough, no wheezing, no shortness of breath  Cardiovascular: no chest pain, no palpitations, no edema  Gastrointestinal: no nausea, vomiting, or diarrhea. No abdominal  pain  Genitourinary: no dysuria, no urinary frequency or urgency, no hematuria  Hema/Lymph: no abnormal bruising or bleeding  Endocrine: no heat or cold intolerance, no excessive thirst or excessive urination  Musculoskeletal: no muscle or joint pain, no joint swelling  Integumentary: no skin rash or abnormal lesion  Psychiatric: no depressed mood, no anxiety, no visual/auditory hallucinations, no delusions, no suicidal or homicidal ideation  Neurologic: as per HPI    Objective:      Physical Exam    General: no acute distress  Eye: unable to assess due to nature of visit  HENT: unable to assess due to nature of visit  Neck: unable to assess due to nature of visit  Respiratory: no distress on RA  Cardiovascular: unable to assess due to nature of visit   Gastrointestinal: unable to assess due to nature of visit  Genitourinary: unable to assess due to nature of visit  Musculoskeletal: rull ROM all extremities without difficulty  Integumentary: no rashes or lesions noted    Comprehensive Neurological Exam:  Mental Status: AAOx4 Naming, repetition, reading, and writing wnl. Comprehension wnl. No dysarthria.   CN II - XII: No ptosis OU, EOMI without nystagmus, Hearing grossly intact, Face Symmetric, Tongue and Uvula midline, Trapezius symmetric bilateral.   Motor: tone and bulk wnl throughout, no abnormal involuntary or voluntary movements, no satelliting w/orbiting, Fine finger movements wnl b/l, No pronator drift.   Sensory: unable to assess due to nature of visit  Reflexes: unable to assess due to nature of visit  Cerebellar: FNF wnl b/l  Romberg: Negative  Gait: normal, bilat arm swing intact    Assessment:       This is a 36 year old female with PMHX of PEs, HERB, TMJ issues, HTN, GERD, Fibromyalgia, Grave's Disease, Sickle Cell Trait, TIA, who was referred for medically intractable chronic migraine with visual aura with status migrainosus not responding to Topamax or Elavil. Recent increase in migraines to 16 on  average per month. Denies due to sleep as she utilizes CPAP. Now on low sodium diet, no HA diary. Ubrelvy effective for migraine, lasmitidan used for severe migraines. Emgality effective for migraine. Continues w/4 migraine/month.    1. Chronic migraine with aura  - galcanezumab-gnlm (EMGALITY PEN) 120 mg/mL PnIj; Inject 120 mg into the skin every 28 days.  Dispense: 1 each; Refill: 11    2. HERB on CPAP    3. Class 2 severe obesity due to excess calories with serious comorbidity and body mass index (BMI) of 37.0 to 37.9 in adult        Plan:       [] c/w Ubrelvy 100mg PO PRN migraine - triptans are contraindicated in pts with hx of HTN and TIA  [] c/w Emgality 120mg/mL once per month  [] c/w Lasmiditan 200mg PRN for severe headaches  [] c/w Topamax 125mg BID  [] consider Botox in future  [] handout on weight loss  [] call office for migraine >24 hrs and failed abortive therapy; will call in HA cocktail    RTC 6 MONTHS    Headache education provided - including good sleep hygiene, stress management, medication overuse education provided - using more 3 OTC per week may worsen headaches, High intensity interval training has shown to reduce headache frequency. Low carb, high protein has shown to reduce headache frequency. Patient is instructed to keep headache diary.    I have explained the treatment plan, diagnosis, and prognosis to the patient, caretaker, family. All questions are answered to the best of my knowledge.    Face to Face Time 30 minute, including, counseling, education, review of test results, relevant medical records, and coordination of care.

## 2023-05-10 ENCOUNTER — OFFICE VISIT (OUTPATIENT)
Dept: ORTHOPEDICS | Facility: CLINIC | Age: 38
End: 2023-05-10
Payer: MEDICAID

## 2023-05-10 VITALS — BODY MASS INDEX: 37.39 KG/M2 | WEIGHT: 211 LBS | HEIGHT: 63 IN

## 2023-05-10 DIAGNOSIS — G56.21 CUBITAL TUNNEL SYNDROME ON RIGHT: Primary | ICD-10-CM

## 2023-05-10 DIAGNOSIS — G56.01 RIGHT CARPAL TUNNEL SYNDROME: ICD-10-CM

## 2023-05-10 DIAGNOSIS — G56.00 CARPAL TUNNEL SYNDROME: ICD-10-CM

## 2023-05-10 PROCEDURE — 99499 UNLISTED E&M SERVICE: CPT | Mod: S$PBB,,, | Performed by: ORTHOPAEDIC SURGERY

## 2023-05-10 PROCEDURE — 3008F PR BODY MASS INDEX (BMI) DOCUMENTED: ICD-10-PCS | Mod: CPTII,,, | Performed by: ORTHOPAEDIC SURGERY

## 2023-05-10 PROCEDURE — 1160F RVW MEDS BY RX/DR IN RCRD: CPT | Mod: CPTII,,, | Performed by: ORTHOPAEDIC SURGERY

## 2023-05-10 PROCEDURE — 99215 OFFICE O/P EST HI 40 MIN: CPT | Mod: PBBFAC

## 2023-05-10 PROCEDURE — 3008F BODY MASS INDEX DOCD: CPT | Mod: CPTII,,, | Performed by: ORTHOPAEDIC SURGERY

## 2023-05-10 PROCEDURE — 1159F PR MEDICATION LIST DOCUMENTED IN MEDICAL RECORD: ICD-10-PCS | Mod: CPTII,,, | Performed by: ORTHOPAEDIC SURGERY

## 2023-05-10 PROCEDURE — 1160F PR REVIEW ALL MEDS BY PRESCRIBER/CLIN PHARMACIST DOCUMENTED: ICD-10-PCS | Mod: CPTII,,, | Performed by: ORTHOPAEDIC SURGERY

## 2023-05-10 PROCEDURE — 99499 NO LOS: ICD-10-PCS | Mod: S$PBB,,, | Performed by: ORTHOPAEDIC SURGERY

## 2023-05-10 PROCEDURE — 1159F MED LIST DOCD IN RCRD: CPT | Mod: CPTII,,, | Performed by: ORTHOPAEDIC SURGERY

## 2023-05-10 RX ORDER — SODIUM CHLORIDE 9 MG/ML
INJECTION, SOLUTION INTRAVENOUS CONTINUOUS
Status: CANCELLED | OUTPATIENT
Start: 2023-05-10

## 2023-05-10 RX ORDER — MUPIROCIN 20 MG/G
OINTMENT TOPICAL
Status: CANCELLED | OUTPATIENT
Start: 2023-05-10

## 2023-05-10 RX ORDER — ALBUTEROL SULFATE 90 UG/1
AEROSOL, METERED RESPIRATORY (INHALATION)
Qty: 18 G | Refills: 1 | Status: SHIPPED | OUTPATIENT
Start: 2023-05-10

## 2023-05-10 NOTE — PROGRESS NOTES
[facility]  Established Patient Office Visit  05/10/2023       Patient ID: Cindy Garcia  YOB: 1985  MRN: [id number]    36F, LHD, with PMH L carpal/cubital syndrome s/p releases 5/19/2022, symptoms much improved she still has a little bit of residual have pain but her numbness and tingling has since completely resolved.  She comes in today complaining mainly of right upper extremity numbness and tingling.  This numbness it comes from her elbow down to the ulnar-sided 2 digits as well as from her wrist into the radial sided 3 digits.  She does have night symptoms that wake her up at night.  She is failed bracing at this point.  She is been dealing with this for some time.  When she was last seen we ordered an EMG.  She had this done.  Otherwise patient's history is unchanged.  At this point she is interested in a right-sided carpal and cubital tunnel release.    Past Medical History:    Past Medical History:   Diagnosis Date    Carpal tunnel syndrome     Cluster headaches     Cubital tunnel syndrome     Diabetes mellitus     Digestive disorder     Diverticulosis     Dysphagia     Fibromyalgia     Fluid retention     GERD (gastroesophageal reflux disease)     Graves disease     Graves' disease     Hypertension     Meniere disease     Neuropathy     PE (pulmonary thromboembolism)     Sciatic nerve pain     Sleep apnea     TMJ (dislocation of temporomandibular joint)      Past Surgical History:   Procedure Laterality Date    ADENOIDECTOMY N/A     CARPAL TUNNEL RELEASE Left 5/19/2022    Procedure: RELEASE, CARPAL TUNNEL;  Surgeon: Eben Bolanos MD;  Location: [facility];  Service: Orthopedics;  Laterality: Left;    CHOLECYSTECTOMY N/A     laryngoscopy and other tracheoscopy N/A 12/23/2014    ULNAR TUNNEL RELEASE Left 5/19/2022    Procedure: RELEASE, CUBITAL TUNNEL;  Surgeon: Eben Bolanos MD;  Location: [facility] OR;  Service: Orthopedics;  Laterality: Left;     Family  History   Problem Relation Age of Onset    Diabetes Mother     COPD Mother     Hypertension Mother     Arthritis Mother     Kidney disease Mother     Vision loss Mother     Asthma Daughter     Cancer Paternal Aunt     Stroke Paternal Aunt      Social History     Socioeconomic History    Marital status:    Tobacco Use    Smoking status: Never    Smokeless tobacco: Never   Substance and Sexual Activity    Alcohol use: Not Currently    Drug use: Never    Sexual activity: Yes     Medication List with Changes/Refills   Current Medications    (MAGIC MOUTHWASH) 1:1:1 DIPHENHYDRAMINE(BENADRYL) 12.5MG/5ML LIQ, ALUMINUM & MAGNESIUM HYDROXIDE-SIMETHICONE (MAALOX), LIDOCAINE VISCOUS 2%    Swish and spit 15 mLs every 4 (four) hours as needed (thrush). for mouth sores    ALBUTEROL (PROVENTIL/VENTOLIN HFA) 90 MCG/ACTUATION INHALER    2 puffs, Inhale, every 6 hr, PRN as needed for wheezing, # 8.5 g, 0 Refill(s), Start Date: 09/01/22 1:44:00 CDT    APIXABAN (ELIQUIS) 5 MG TAB    Take 1 tablet (5 mg total) by mouth once daily.    BUSPIRONE (BUSPAR) 5 MG TAB    Take 1 tablet (5 mg total) by mouth 2 (two) times daily.    CETIRIZINE (ZYRTEC) 10 MG TABLET    Take 11 tablets by mouth once daily.    CHOLECALCIFEROL, VITAMIN D3, 1,250 MCG (50,000 UNIT) CAPSULE    Take 1 capsule (50,000 Units total) by mouth twice a week.    DILTIAZEM (CARDIZEM CD) 120 MG CP24    Take 1 capsule (120 mg total) by mouth once daily.    FEROSUL 325 MG (65 MG IRON) TAB TABLET    Take 1 tablet by mouth once daily.    FUROSEMIDE (LASIX) 20 MG TABLET    Take 1 tablet (20 mg total) by mouth once daily.    GABAPENTIN (NEURONTIN) 800 MG TABLET    ONE TABLET (BY MOUTH) THREE TIMES A DAY    GALCANEZUMAB-GNLM (EMGALITY PEN) 120 MG/ML PNIJ    Inject 120 mg into the skin every 28 days.    HYDROXYZINE HCL (ATARAX) 25 MG TABLET    Take 1 tablet (25 mg total) by mouth 3 (three) times daily as needed for Anxiety.    LACTULOSE (CHRONULAC) 20 GRAM/30 ML SOLN    Take 30 mLs  (20 g total) by mouth 3 (three) times daily as needed (constipation).    LASMIDITAN (REYVOW) TABLET 100 MG    TAKE 2 TABLETS (BY MOUTH) EVERY DAY AS NEEDED FOR SEVERE HEADACHES ONLY. ALLOW AT LEAST 24 HOURS BETWEEN DOSES.    LINACLOTIDE (LINZESS) 72 MCG CAP CAPSULE    Take 1 capsule (72 mcg total) by mouth before breakfast.    METFORMIN (GLUCOPHAGE-XR) 500 MG ER 24HR TABLET    TAKE 2 TABLETS (BY MOUTH) EVERY MORNING    METHOCARBAMOL (ROBAXIN) 500 MG TAB    Take 1 tablet (500 mg total) by mouth 4 (four) times daily as needed (muscle spasms).    METOCLOPRAMIDE HCL (REGLAN) 5 MG TABLET    Take 1 tablet (5 mg total) by mouth 4 (four) times daily.    MIRTAZAPINE (REMERON) 7.5 MG TAB    Take 1 tablet (7.5 mg total) by mouth nightly.    ONDANSETRON (ZOFRAN) 8 MG TABLET    Take 1 tablet (8 mg total) by mouth every 8 (eight) hours as needed for Nausea.    PANTOPRAZOLE (PROTONIX) 40 MG TABLET    Take 1 tablet (40 mg total) by mouth once daily.    POTASSIUM CHLORIDE (KLOR-CON) 10 MEQ TBSR    Take 1 tablet (10 mEq total) by mouth 2 (two) times daily.    TOPIRAMATE (TOPAMAX) 100 MG TABLET    Take 1 tablet (100 mg total) by mouth 2 (two) times daily.    TOPIRAMATE (TOPAMAX) 25 MG TABLET    Take 1 tablet (25 mg total) by mouth 2 (two) times daily.    UBROGEPANT (UBRELVY) 100 MG TABLET    Take 1 tablet (100 mg total) by mouth daily as needed for Migraine (May repeat in 2 hrs; no more than 2 tabs in 24 hours).     Review of patient's allergies indicates:  No Known Allergies    ROS:    Body mass index is 37.38 kg/m².  GENERAL: Well appearing, appropriate for stated age, no acute distress.  CARDIOVASCULAR: Pulses regular by peripheral palpation.  PULMONARY: Respirations are even and non-labored.  NEURO: Awake, alert, and oriented x 3.  PSYCH: Mood & affect are appropriate.  HEENT: Head is normocephalic and atraumatic.    Physical Exam:  Right upper extremity   Negative Spurling C5-T1 5/5 motor sensation intact in this distribution    No atrophy noted of the hand or forearm   No wounds or abrasions   Able make a full fist flex and extend at the wrist and elbow with full range of motion   Two-point discrimination equivocal but she does report slightly decreased sensation on the radial side over palm versus the ulnar side with both sides being diminished   Positive Tinel's Durkan's compression and Phalen's test at the wrist   Positive Tinel's at the elbow over the ulnar nerve and positive elbow flexion test   Capillary refill less than 2 seconds    EMG conclusion read as abnormal electrodiagnostic study of the right upper extremity consistent with:  1.  Sensory motor polyneuropathy 2.  Mild right median neuropathy at the wrist 3.  Mild right ulnar neuropathy at the wrist.    Imaging:    Relevant imaging results reviewed and interpreted by me, discussed with the patient and / or family today. None    Assessment and Plan:    Cindy Garcia is a 37 y.o. female seen in the office today for The primary encounter diagnosis was Cubital tunnel syndrome on right. Diagnoses of Right carpal tunnel syndrome and Carpal tunnel syndrome were also pertinent to this visit..     36F, LHD, with PMH L carpal/cubital syndrome s/p releases 5/19/2022, symptoms much improved.     -patient booked and consented today for right carpal and cubital tunnel release on 06/08/2023, this is pending her EMG results which we received after patient left clinic today, she will see our hand staff on 06/05/2023 and we will discuss and re-examined patient on that day to confirm surgical indication for right carpal tunnel release and/or right cubital tunnel release  -follow up to see Dr. Hunter on 6/5/23    Eric Baird

## 2023-05-12 NOTE — PROGRESS NOTES
Faculty Attestation: Cindy Reyes Garcia  was seen at [facility] in the Orthopaedic Clinic. Patient chart reviewed. History of Present Illness, Physical Exam, and Assessment and Plan reviewed. Treatment plan is reasonable and appropriate. Compliance with treatment recommendations is important. No procedure was performed.     Robert Rogers MD  Orthopaedic Surgery

## 2023-06-05 ENCOUNTER — OFFICE VISIT (OUTPATIENT)
Dept: ORTHOPEDICS | Facility: CLINIC | Age: 38
End: 2023-06-05
Payer: MEDICAID

## 2023-06-05 ENCOUNTER — PATIENT MESSAGE (OUTPATIENT)
Dept: ADMINISTRATIVE | Facility: OTHER | Age: 38
End: 2023-06-05

## 2023-06-05 DIAGNOSIS — G56.00 CARPAL TUNNEL SYNDROME, UNSPECIFIED LATERALITY: Primary | ICD-10-CM

## 2023-06-05 PROCEDURE — 99213 OFFICE O/P EST LOW 20 MIN: CPT | Mod: S$PBB,,, | Performed by: STUDENT IN AN ORGANIZED HEALTH CARE EDUCATION/TRAINING PROGRAM

## 2023-06-05 PROCEDURE — 99213 OFFICE O/P EST LOW 20 MIN: CPT | Mod: PBBFAC

## 2023-06-05 PROCEDURE — 99213 PR OFFICE/OUTPT VISIT, EST, LEVL III, 20-29 MIN: ICD-10-PCS | Mod: S$PBB,,, | Performed by: STUDENT IN AN ORGANIZED HEALTH CARE EDUCATION/TRAINING PROGRAM

## 2023-06-05 NOTE — PROGRESS NOTES
[facility]  Established Patient Office Visit  06/05/2023       Patient ID: Cindy Garcia  YOB: 1985  MRN: [id number]        Today:  Patient is here to discuss surgery with our hand staff.   She is still having  mainly of right upper extremity numbness and tingling.  This numbness it comes from her elbow down to the ulnar-sided 2 digits as well as from her wrist into the radial sided 3 digits.  She does have night symptoms that wake her up at night.  She is failed bracing at this point.  She is been dealing with this for some time.  At this point she is interested in a right-sided carpal and cubital tunnel release. Still interested in right carpal and cubital tunnel release.    Past Medical History:    Past Medical History:   Diagnosis Date    Carpal tunnel syndrome     Cluster headaches     Cubital tunnel syndrome     Diabetes mellitus     Digestive disorder     Diverticulosis     Dysphagia     Fibromyalgia     Fluid retention     GERD (gastroesophageal reflux disease)     Graves disease     Graves' disease     Hypertension     Meniere disease     Neuropathy     PE (pulmonary thromboembolism)     Sciatic nerve pain     Sleep apnea     TMJ (dislocation of temporomandibular joint)      Past Surgical History:   Procedure Laterality Date    ADENOIDECTOMY N/A     CARPAL TUNNEL RELEASE Left 5/19/2022    Procedure: RELEASE, CARPAL TUNNEL;  Surgeon: Eben Bolanos MD;  Location: [facility] OR;  Service: Orthopedics;  Laterality: Left;    CHOLECYSTECTOMY N/A     laryngoscopy and other tracheoscopy N/A 12/23/2014    ULNAR TUNNEL RELEASE Left 5/19/2022    Procedure: RELEASE, CUBITAL TUNNEL;  Surgeon: Eben Bolanos MD;  Location: [facility] OR;  Service: Orthopedics;  Laterality: Left;     Family History   Problem Relation Age of Onset    Diabetes Mother     COPD Mother     Hypertension Mother     Arthritis Mother     Kidney disease Mother     Vision loss Mother     Asthma Daughter      Cancer Paternal Aunt     Stroke Paternal Aunt      Social History     Socioeconomic History    Marital status:    Tobacco Use    Smoking status: Never    Smokeless tobacco: Never   Substance and Sexual Activity    Alcohol use: Not Currently    Drug use: Never    Sexual activity: Yes     Medication List with Changes/Refills   Current Medications    (MAGIC MOUTHWASH) 1:1:1 DIPHENHYDRAMINE(BENADRYL) 12.5MG/5ML LIQ, ALUMINUM & MAGNESIUM HYDROXIDE-SIMETHICONE (MAALOX), LIDOCAINE VISCOUS 2%    Swish and spit 15 mLs every 4 (four) hours as needed (thrush). for mouth sores    ALBUTEROL (PROVENTIL/VENTOLIN HFA) 90 MCG/ACTUATION INHALER    2 puffs, Inhale, every 6 hr, PRN as needed for wheezing, # 8.5 g, 0 Refill(s), Start Date: 09/01/22 1:44:00 CDT    APIXABAN (ELIQUIS) 5 MG TAB    Take 1 tablet (5 mg total) by mouth once daily.    BUSPIRONE (BUSPAR) 5 MG TAB    Take 1 tablet (5 mg total) by mouth 2 (two) times daily.    CETIRIZINE (ZYRTEC) 10 MG TABLET    Take 11 tablets by mouth once daily.    CHOLECALCIFEROL, VITAMIN D3, 1,250 MCG (50,000 UNIT) CAPSULE    Take 1 capsule (50,000 Units total) by mouth twice a week.    DILTIAZEM (CARDIZEM CD) 120 MG CP24    Take 1 capsule (120 mg total) by mouth once daily.    FEROSUL 325 MG (65 MG IRON) TAB TABLET    Take 1 tablet by mouth once daily.    FUROSEMIDE (LASIX) 20 MG TABLET    Take 1 tablet (20 mg total) by mouth once daily.    GABAPENTIN (NEURONTIN) 800 MG TABLET    ONE TABLET (BY MOUTH) THREE TIMES A DAY    GALCANEZUMAB-GNLM (EMGALITY PEN) 120 MG/ML PNIJ    Inject 120 mg into the skin every 28 days.    HYDROXYZINE HCL (ATARAX) 25 MG TABLET    Take 1 tablet (25 mg total) by mouth 3 (three) times daily as needed for Anxiety.    LACTULOSE (CHRONULAC) 20 GRAM/30 ML SOLN    Take 30 mLs (20 g total) by mouth 3 (three) times daily as needed (constipation).    LASMIDITAN (REYVOW) TABLET 100 MG    TAKE 2 TABLETS (BY MOUTH) EVERY DAY AS NEEDED FOR SEVERE HEADACHES ONLY. ALLOW  AT LEAST 24 HOURS BETWEEN DOSES.    LINACLOTIDE (LINZESS) 72 MCG CAP CAPSULE    Take 1 capsule (72 mcg total) by mouth before breakfast.    METFORMIN (GLUCOPHAGE-XR) 500 MG ER 24HR TABLET    TAKE 2 TABLETS (BY MOUTH) EVERY MORNING    METHOCARBAMOL (ROBAXIN) 500 MG TAB    Take 1 tablet (500 mg total) by mouth 4 (four) times daily as needed (muscle spasms).    METOCLOPRAMIDE HCL (REGLAN) 5 MG TABLET    Take 1 tablet (5 mg total) by mouth 4 (four) times daily.    MIRTAZAPINE (REMERON) 7.5 MG TAB    Take 1 tablet (7.5 mg total) by mouth nightly.    ONDANSETRON (ZOFRAN) 8 MG TABLET    Take 1 tablet (8 mg total) by mouth every 8 (eight) hours as needed for Nausea.    PANTOPRAZOLE (PROTONIX) 40 MG TABLET    Take 1 tablet (40 mg total) by mouth once daily.    POTASSIUM CHLORIDE (KLOR-CON) 10 MEQ TBSR    Take 1 tablet (10 mEq total) by mouth 2 (two) times daily.    TOPIRAMATE (TOPAMAX) 100 MG TABLET    Take 1 tablet (100 mg total) by mouth 2 (two) times daily.    TOPIRAMATE (TOPAMAX) 25 MG TABLET    Take 1 tablet (25 mg total) by mouth 2 (two) times daily.    UBROGEPANT (UBRELVY) 100 MG TABLET    Take 1 tablet (100 mg total) by mouth daily as needed for Migraine (May repeat in 2 hrs; no more than 2 tabs in 24 hours).     Review of patient's allergies indicates:  No Known Allergies    ROS:    There is no height or weight on file to calculate BMI.  GENERAL: Well appearing, appropriate for stated age, no acute distress.  CARDIOVASCULAR: Pulses regular by peripheral palpation.  PULMONARY: Respirations are even and non-labored.  NEURO: Awake, alert, and oriented x 3.  PSYCH: Mood & affect are appropriate.  HEENT: Head is normocephalic and atraumatic.    Physical Exam:  Right upper extremity   Negative Spurling C5-T1 5/5 motor sensation intact in this distribution   No atrophy noted of the hand or forearm   No wounds or abrasions   Able make a full fist flex and extend at the wrist and elbow with full range of motion   Two-point  discrimination equivocal but she does report slightly decreased sensation on the radial side over palm versus the ulnar side with both sides being diminished   Positive Tinel's Durkan's compression and Phalen's test at the wrist   Positive Tinel's at the elbow over the ulnar nerve and positive elbow flexion test   Capillary refill less than 2 seconds    EMG conclusion read as abnormal electrodiagnostic study of the right upper extremity consistent with:  1.  Sensory motor polyneuropathy 2.  Mild right median neuropathy at the wrist 3.  Mild right ulnar neuropathy at the wrist.    Imaging:    Relevant imaging results reviewed and interpreted by me, discussed with the patient and / or family today. None    Assessment and Plan:    Cindy Garcia is a 37 y.o. female seen in the office today for There were no encounter diagnoses..     36F, LHD, with PMH L carpal/cubital syndrome s/p releases 5/19/2022, now presenting with right carpal tunnel and right cubital tunnel symptoms.    -patient booked and consented today for right carpal and cubital tunnel release on 06/08/2023,   -we discussed with her on exam it does appear she has both carpal and cubital tunnel symptoms on the right and will plan to release but due to the EMG findings of polyneuropathy she understands this may not provide complete relief  -follow up to see Dr. Hunter on 6/5/23    Eric Baird

## 2023-06-06 ENCOUNTER — ANESTHESIA EVENT (OUTPATIENT)
Dept: SURGERY | Facility: HOSPITAL | Age: 38
End: 2023-06-06
Payer: MEDICAID

## 2023-06-06 ENCOUNTER — PATIENT MESSAGE (OUTPATIENT)
Dept: ADMINISTRATIVE | Facility: OTHER | Age: 38
End: 2023-06-06

## 2023-06-06 NOTE — ANESTHESIA PREPROCEDURE EVALUATION
06/06/2023  Cindy Garcia is a 37 y.o., female with PMHx of obesity, HTN, h/o PE, HERB CPAP machine recalled, GERD, TIA, fibromyalgia, depression/anxiety presents for Rt CTR, CuTR.    NO BETA BLOCKER USE    Active Ambulatory Problems     Diagnosis Date Noted    Chronic migraine with aura 06/14/2022    Generalized pain 07/28/2022    BMI 37.0-37.9, adult 07/28/2022    Carpal tunnel syndrome 08/08/2022    Right carpal tunnel syndrome 08/08/2022    Cubital tunnel syndrome on right 08/08/2022    Elevated C-reactive protein (CRP) 08/23/2022    Hypokalemia 08/23/2022    Iron deficiency anemia secondary to inadequate dietary iron intake 08/23/2022    Subclinical hyperthyroidism 08/23/2022    Vitamin D deficiency 08/23/2022    Chronic pulmonary embolism 11/29/2022    Anxiety 12/22/2022    Gastroesophageal reflux disease without esophagitis 12/22/2022    Chronic idiopathic constipation 03/27/2023    Oral candidiasis 03/27/2023    Infective urethritis 04/05/2023    HERB on CPAP 05/08/2023    Class 2 severe obesity with serious comorbidity and body mass index (BMI) of 37.0 to 37.9 in adult 05/08/2023     Resolved Ambulatory Problems     Diagnosis Date Noted    No Resolved Ambulatory Problems     Past Medical History:   Diagnosis Date    Cluster headaches     Cubital tunnel syndrome     Diabetes mellitus     Digestive disorder     Diverticulosis     Dysphagia     Fibromyalgia     Fluid retention     GERD (gastroesophageal reflux disease)     Graves disease     Graves' disease     Hypertension     Meniere disease     Neuropathy     PE (pulmonary thromboembolism)     Sciatic nerve pain     Sleep apnea     TMJ (dislocation of temporomandibular joint)          Pre-op Assessment    I have reviewed the Patient Summary Reports.     I have reviewed the Nursing Notes. I have reviewed  the NPO Status.   I have reviewed the Medications.     Review of Systems  Anesthesia Hx:  No problems with previous Anesthesia  Denies Family Hx of Anesthesia complications.   Denies Personal Hx of Anesthesia complications.   Hematology/Oncology:  Hematology Normal   Oncology Normal     EENT/Dental:  EENT/Dental Normal  Ears General/Symptom(s) Ear Disease: Tympanic Membrane Problem   Cardiovascular:  Cardiovascular Normal     Pulmonary:  Pulmonary Normal    Renal/:  Renal/ Normal     Hepatic/GI:  Hepatic/GI Normal    Musculoskeletal:  Musculoskeletal Normal    Neurological:  Neurology Normal    Endocrine:  Endocrine Normal    Dermatological:  Skin Normal    Psych:  Psychiatric Normal           Physical Exam  General: Alert    Airway:  Mallampati: II   Mouth Opening: Normal  TM Distance: Normal  Tongue: Normal  Neck ROM: Normal ROM    Dental:  Intact    Chest/Lungs:  Clear to auscultation, Normal Respiratory Rate    Heart:  Rate: Normal  Rhythm: Regular Rhythm      Lab Results   Component Value Date    WBC 5.3 03/27/2023    HGB 13.1 03/27/2023    HCT 40.2 03/27/2023    MCV 89.1 03/27/2023     03/27/2023       CMP  Sodium Level   Date Value Ref Range Status   03/27/2023 141 136 - 145 mmol/L Final     Potassium Level   Date Value Ref Range Status   03/27/2023 3.9 3.5 - 5.1 mmol/L Final     Carbon Dioxide   Date Value Ref Range Status   03/27/2023 24 22 - 29 mmol/L Final     Blood Urea Nitrogen   Date Value Ref Range Status   03/27/2023 8.5 7.0 - 18.7 mg/dL Final     Creatinine   Date Value Ref Range Status   03/27/2023 1.05 (H) 0.55 - 1.02 mg/dL Final     Calcium Level Total   Date Value Ref Range Status   03/27/2023 10.0 8.4 - 10.2 mg/dL Final     Albumin Level   Date Value Ref Range Status   03/27/2023 4.1 3.5 - 5.0 g/dL Final     Bilirubin Total   Date Value Ref Range Status   03/27/2023 0.6 <=1.5 mg/dL Final     Alkaline Phosphatase   Date Value Ref Range Status   03/27/2023 101 40 - 150 unit/L Final      Aspartate Aminotransferase   Date Value Ref Range Status   03/27/2023 11 5 - 34 unit/L Final     Alanine Aminotransferase   Date Value Ref Range Status   03/27/2023 13 0 - 55 unit/L Final     eGFR   Date Value Ref Range Status   03/27/2023 >60 mls/min/1.73/m2 Final           Anesthesia Plan  Type of Anesthesia, risks & benefits discussed:    Anesthesia Type: MAC, Regional  Intra-op Monitoring Plan: Standard ASA Monitors  Post Op Pain Control Plan: multimodal analgesia, IV/PO Opioids PRN and peripheral nerve block  Induction:  IV  Informed Consent: Informed consent signed with the Patient and all parties understand the risks and agree with anesthesia plan.  All questions answered.   ASA Score: 3  Day of Surgery Review of History & Physical: H&P Update referred to the surgeon/provider.I have interviewed and examined the patient. I have reviewed the patient's H&P dated: There are no significant changes. H&P completed by Anesthesiologist.    Ready For Surgery From Anesthesia Perspective.     .

## 2023-06-07 RX ORDER — HYDROCODONE BITARTRATE AND ACETAMINOPHEN 5; 325 MG/1; MG/1
1 TABLET ORAL EVERY 6 HOURS PRN
Qty: 20 TABLET | Refills: 0 | Status: SHIPPED | OUTPATIENT
Start: 2023-06-07 | End: 2023-06-12

## 2023-06-07 RX ORDER — ONDANSETRON 4 MG/1
4 TABLET, ORALLY DISINTEGRATING ORAL EVERY 8 HOURS PRN
Qty: 21 TABLET | Refills: 0 | Status: SHIPPED | OUTPATIENT
Start: 2023-06-07 | End: 2023-06-14

## 2023-06-07 RX ORDER — HYDROMORPHONE HYDROCHLORIDE 1 MG/ML
0.2 INJECTION, SOLUTION INTRAMUSCULAR; INTRAVENOUS; SUBCUTANEOUS EVERY 5 MIN PRN
Status: CANCELLED | OUTPATIENT
Start: 2023-06-07

## 2023-06-07 RX ORDER — DROPERIDOL 2.5 MG/ML
0.25 INJECTION, SOLUTION INTRAMUSCULAR; INTRAVENOUS ONCE AS NEEDED
Status: CANCELLED | OUTPATIENT
Start: 2023-06-07 | End: 2034-11-03

## 2023-06-07 RX ORDER — DIPHENHYDRAMINE HYDROCHLORIDE 50 MG/ML
6.25 INJECTION INTRAMUSCULAR; INTRAVENOUS ONCE AS NEEDED
Status: CANCELLED | OUTPATIENT
Start: 2023-06-07 | End: 2034-11-03

## 2023-06-07 RX ORDER — METOCLOPRAMIDE HYDROCHLORIDE 5 MG/ML
10 INJECTION INTRAMUSCULAR; INTRAVENOUS ONCE AS NEEDED
Status: CANCELLED | OUTPATIENT
Start: 2023-06-07 | End: 2034-11-03

## 2023-06-07 NOTE — H&P
There are no changes in the H&P documented below. Plan to proceed with surgery as previously discussed.     Eric Castanon Schexnayder [facility]  Established Patient Office Visit  06/05/2023         Patient ID: Cindy Garcia  YOB: 1985  MRN: [id number]           Today:  Patient is here to discuss surgery with our hand staff.   She is still having  mainly of right upper extremity numbness and tingling.  This numbness it comes from her elbow down to the ulnar-sided 2 digits as well as from her wrist into the radial sided 3 digits.  She does have night symptoms that wake her up at night.  She is failed bracing at this point.  She is been dealing with this for some time.  At this point she is interested in a right-sided carpal and cubital tunnel release. Still interested in right carpal and cubital tunnel release.     Past Medical History:          Past Medical History:   Diagnosis Date    Carpal tunnel syndrome      Cluster headaches      Cubital tunnel syndrome      Diabetes mellitus      Digestive disorder      Diverticulosis      Dysphagia      Fibromyalgia      Fluid retention      GERD (gastroesophageal reflux disease)      Graves disease      Graves' disease      Hypertension      Meniere disease      Neuropathy      PE (pulmonary thromboembolism)      Sciatic nerve pain      Sleep apnea      TMJ (dislocation of temporomandibular joint)              Past Surgical History:   Procedure Laterality Date    ADENOIDECTOMY N/A      CARPAL TUNNEL RELEASE Left 5/19/2022     Procedure: RELEASE, CARPAL TUNNEL;  Surgeon: Eben Bolanos MD;  Location: [facility];  Service: Orthopedics;  Laterality: Left;    CHOLECYSTECTOMY N/A      laryngoscopy and other tracheoscopy N/A 12/23/2014    ULNAR TUNNEL RELEASE Left 5/19/2022     Procedure: RELEASE, CUBITAL TUNNEL;  Surgeon: Eben Bolanos MD;  Location: [facility] OR;  Service: Orthopedics;  Laterality: Left;            Family  History   Problem Relation Age of Onset    Diabetes Mother      COPD Mother      Hypertension Mother      Arthritis Mother      Kidney disease Mother      Vision loss Mother      Asthma Daughter      Cancer Paternal Aunt      Stroke Paternal Aunt        Social History              Socioeconomic History    Marital status:    Tobacco Use    Smoking status: Never    Smokeless tobacco: Never   Substance and Sexual Activity    Alcohol use: Not Currently    Drug use: Never    Sexual activity: Yes              Medication List with Changes/Refills   Current Medications     (MAGIC MOUTHWASH) 1:1:1 DIPHENHYDRAMINE(BENADRYL) 12.5MG/5ML LIQ, ALUMINUM & MAGNESIUM HYDROXIDE-SIMETHICONE (MAALOX), LIDOCAINE VISCOUS 2%    Swish and spit 15 mLs every 4 (four) hours as needed (thrush). for mouth sores     ALBUTEROL (PROVENTIL/VENTOLIN HFA) 90 MCG/ACTUATION INHALER    2 puffs, Inhale, every 6 hr, PRN as needed for wheezing, # 8.5 g, 0 Refill(s), Start Date: 09/01/22 1:44:00 CDT     APIXABAN (ELIQUIS) 5 MG TAB    Take 1 tablet (5 mg total) by mouth once daily.     BUSPIRONE (BUSPAR) 5 MG TAB    Take 1 tablet (5 mg total) by mouth 2 (two) times daily.     CETIRIZINE (ZYRTEC) 10 MG TABLET    Take 11 tablets by mouth once daily.     CHOLECALCIFEROL, VITAMIN D3, 1,250 MCG (50,000 UNIT) CAPSULE    Take 1 capsule (50,000 Units total) by mouth twice a week.     DILTIAZEM (CARDIZEM CD) 120 MG CP24    Take 1 capsule (120 mg total) by mouth once daily.     FEROSUL 325 MG (65 MG IRON) TAB TABLET    Take 1 tablet by mouth once daily.     FUROSEMIDE (LASIX) 20 MG TABLET    Take 1 tablet (20 mg total) by mouth once daily.     GABAPENTIN (NEURONTIN) 800 MG TABLET    ONE TABLET (BY MOUTH) THREE TIMES A DAY     GALCANEZUMAB-GNLM (EMGALITY PEN) 120 MG/ML PNIJ    Inject 120 mg into the skin every 28 days.     HYDROXYZINE HCL (ATARAX) 25 MG TABLET    Take 1 tablet (25 mg total) by mouth 3 (three) times daily as needed for Anxiety.     LACTULOSE  (CHRONULAC) 20 GRAM/30 ML SOLN    Take 30 mLs (20 g total) by mouth 3 (three) times daily as needed (constipation).     LASMIDITAN (REYVOW) TABLET 100 MG    TAKE 2 TABLETS (BY MOUTH) EVERY DAY AS NEEDED FOR SEVERE HEADACHES ONLY. ALLOW AT LEAST 24 HOURS BETWEEN DOSES.     LINACLOTIDE (LINZESS) 72 MCG CAP CAPSULE    Take 1 capsule (72 mcg total) by mouth before breakfast.     METFORMIN (GLUCOPHAGE-XR) 500 MG ER 24HR TABLET    TAKE 2 TABLETS (BY MOUTH) EVERY MORNING     METHOCARBAMOL (ROBAXIN) 500 MG TAB    Take 1 tablet (500 mg total) by mouth 4 (four) times daily as needed (muscle spasms).     METOCLOPRAMIDE HCL (REGLAN) 5 MG TABLET    Take 1 tablet (5 mg total) by mouth 4 (four) times daily.     MIRTAZAPINE (REMERON) 7.5 MG TAB    Take 1 tablet (7.5 mg total) by mouth nightly.     ONDANSETRON (ZOFRAN) 8 MG TABLET    Take 1 tablet (8 mg total) by mouth every 8 (eight) hours as needed for Nausea.     PANTOPRAZOLE (PROTONIX) 40 MG TABLET    Take 1 tablet (40 mg total) by mouth once daily.     POTASSIUM CHLORIDE (KLOR-CON) 10 MEQ TBSR    Take 1 tablet (10 mEq total) by mouth 2 (two) times daily.     TOPIRAMATE (TOPAMAX) 100 MG TABLET    Take 1 tablet (100 mg total) by mouth 2 (two) times daily.     TOPIRAMATE (TOPAMAX) 25 MG TABLET    Take 1 tablet (25 mg total) by mouth 2 (two) times daily.     UBROGEPANT (UBRELVY) 100 MG TABLET    Take 1 tablet (100 mg total) by mouth daily as needed for Migraine (May repeat in 2 hrs; no more than 2 tabs in 24 hours).      Review of patient's allergies indicates:  No Known Allergies     ROS:     There is no height or weight on file to calculate BMI.  GENERAL: Well appearing, appropriate for stated age, no acute distress.  CARDIOVASCULAR: Pulses regular by peripheral palpation.  PULMONARY: Respirations are even and non-labored.  NEURO: Awake, alert, and oriented x 3.  PSYCH: Mood & affect are appropriate.  HEENT: Head is normocephalic and atraumatic.     Physical Exam:  Right upper  extremity   Negative Spurling C5-T1 5/5 motor sensation intact in this distribution   No atrophy noted of the hand or forearm   No wounds or abrasions   Able make a full fist flex and extend at the wrist and elbow with full range of motion   Two-point discrimination equivocal but she does report slightly decreased sensation on the radial side over palm versus the ulnar side with both sides being diminished   Positive Tinel's Durkan's compression and Phalen's test at the wrist   Positive Tinel's at the elbow over the ulnar nerve and positive elbow flexion test   Capillary refill less than 2 seconds     EMG conclusion read as abnormal electrodiagnostic study of the right upper extremity consistent with:  1.  Sensory motor polyneuropathy 2.  Mild right median neuropathy at the wrist 3.  Mild right ulnar neuropathy at the wrist.     Imaging:     Relevant imaging results reviewed and interpreted by me, discussed with the patient and / or family today. None     Assessment and Plan:     Cindy Garcia is a 37 y.o. female seen in the office today for There were no encounter diagnoses..      36F, LHD, with PMH L carpal/cubital syndrome s/p releases 5/19/2022, now presenting with right carpal tunnel and right cubital tunnel symptoms.     -patient booked and consented today for right carpal and cubital tunnel release on 06/08/2023,   -we discussed with her on exam it does appear she has both carpal and cubital tunnel symptoms on the right and will plan to release but due to the EMG findings of polyneuropathy she understands this may not provide complete relief  -follow up to see Dr. Hunter on 6/5/23     Eric Baird

## 2023-06-07 NOTE — DISCHARGE INSTRUCTIONS
· Keep follow up appointment on [facility].    · Take pain medication as prescribed.    · Keep arm elevated on pillow.    · No driving or consuming alcohol for the next 24 hours or while taking narcotic pain medicine.    · May apply ice pack to surgical area for 20 minutes at a time 6-8 times per day.    · Dressing: Leave the bandages on until clinic, keep the bandages clean and dry, if you choose, you maykeep clean and dry for 5 days then remove-you may shower after removing dressing (incision may get wet but do not use right hand to scrub and do not scrub incision)  then redress incision with bandaids.   DO NOT scrub the surgical sites    · NO LIFTING OR PULLING WITH AFFECTED ARM until cleared by MD.  Start wriggling fingers and making a fist and bending elbow GENTLY today    · Notify MD of any moderate to severe pain unrelieved by pain medicine or for any signs of infection including fever above 100.4, excessive redness or swelling, yellow/green foul- smelling drainage, nausea or vomiting. Call clinic at: [phone]. After business hours, if you are unable to reach a doctor on call at [phone] or your concern is an emergency, call 911 or report to your nearest emergency room.    · Thanks for choosing [facility]! Have a smooth recovery!

## 2023-06-07 NOTE — PROGRESS NOTES
Faculty Attestation: Cindy Reyes Jose  was seen at [facility] in the Orthopaedic Clinic. Patient seen and evaluated at the time of the visit. History of Present Illness, Physical Exam, and Assessment and Plan reviewed. Treatment plan is reasonable and appropriate. Compliance with treatment recommendations is important. No procedure was performed.     Shola Hunter MD  Orthopaedic Surgery

## 2023-06-08 ENCOUNTER — ANESTHESIA (OUTPATIENT)
Dept: SURGERY | Facility: HOSPITAL | Age: 38
End: 2023-06-08
Payer: MEDICAID

## 2023-06-08 ENCOUNTER — HOSPITAL ENCOUNTER (OUTPATIENT)
Facility: HOSPITAL | Age: 38
Discharge: HOME | End: 2023-06-08
Attending: ORTHOPAEDIC SURGERY | Admitting: ORTHOPAEDIC SURGERY
Payer: MEDICAID

## 2023-06-08 ENCOUNTER — PATIENT MESSAGE (OUTPATIENT)
Dept: ADMINISTRATIVE | Facility: OTHER | Age: 38
End: 2023-06-08

## 2023-06-08 DIAGNOSIS — F51.01 PRIMARY INSOMNIA: ICD-10-CM

## 2023-06-08 DIAGNOSIS — G56.21 CUBITAL TUNNEL SYNDROME ON RIGHT: ICD-10-CM

## 2023-06-08 DIAGNOSIS — G56.01 RIGHT CARPAL TUNNEL SYNDROME: ICD-10-CM

## 2023-06-08 DIAGNOSIS — G56.00 CARPAL TUNNEL SYNDROME: ICD-10-CM

## 2023-06-08 LAB
B-HCG UR QL: NEGATIVE
CTP QC/QA: YES
POCT GLUCOSE: 106 MG/DL (ref 70–110)
POCT GLUCOSE: 117 MG/DL (ref 70–110)

## 2023-06-08 PROCEDURE — D9220A PRA ANESTHESIA: ICD-10-PCS | Mod: ANES,,, | Performed by: ANESTHESIOLOGY

## 2023-06-08 PROCEDURE — 36000707: Performed by: STUDENT IN AN ORGANIZED HEALTH CARE EDUCATION/TRAINING PROGRAM

## 2023-06-08 PROCEDURE — 82962 GLUCOSE BLOOD TEST: CPT | Performed by: STUDENT IN AN ORGANIZED HEALTH CARE EDUCATION/TRAINING PROGRAM

## 2023-06-08 PROCEDURE — 63600175 PHARM REV CODE 636 W HCPCS: Performed by: NURSE ANESTHETIST, CERTIFIED REGISTERED

## 2023-06-08 PROCEDURE — 37000008 HC ANESTHESIA 1ST 15 MINUTES: Performed by: STUDENT IN AN ORGANIZED HEALTH CARE EDUCATION/TRAINING PROGRAM

## 2023-06-08 PROCEDURE — 64721 PR REVISE MEDIAN N/CARPAL TUNNEL SURG: ICD-10-PCS | Mod: 51,RT,, | Performed by: STUDENT IN AN ORGANIZED HEALTH CARE EDUCATION/TRAINING PROGRAM

## 2023-06-08 PROCEDURE — 63600175 PHARM REV CODE 636 W HCPCS

## 2023-06-08 PROCEDURE — 81025 URINE PREGNANCY TEST: CPT | Performed by: ORTHOPAEDIC SURGERY

## 2023-06-08 PROCEDURE — 63600175 PHARM REV CODE 636 W HCPCS: Performed by: ANESTHESIOLOGY

## 2023-06-08 PROCEDURE — 71000015 HC POSTOP RECOV 1ST HR: Performed by: STUDENT IN AN ORGANIZED HEALTH CARE EDUCATION/TRAINING PROGRAM

## 2023-06-08 PROCEDURE — 25000003 PHARM REV CODE 250: Performed by: ANESTHESIOLOGY

## 2023-06-08 PROCEDURE — D9220A PRA ANESTHESIA: ICD-10-PCS | Mod: CRNA,,, | Performed by: NURSE ANESTHETIST, CERTIFIED REGISTERED

## 2023-06-08 PROCEDURE — 25000003 PHARM REV CODE 250: Performed by: NURSE ANESTHETIST, CERTIFIED REGISTERED

## 2023-06-08 PROCEDURE — 64721 CARPAL TUNNEL SURGERY: CPT | Mod: 51,RT,, | Performed by: STUDENT IN AN ORGANIZED HEALTH CARE EDUCATION/TRAINING PROGRAM

## 2023-06-08 PROCEDURE — D9220A PRA ANESTHESIA: Mod: ANES,,, | Performed by: ANESTHESIOLOGY

## 2023-06-08 PROCEDURE — 36000706: Performed by: STUDENT IN AN ORGANIZED HEALTH CARE EDUCATION/TRAINING PROGRAM

## 2023-06-08 PROCEDURE — 71000016 HC POSTOP RECOV ADDL HR: Performed by: STUDENT IN AN ORGANIZED HEALTH CARE EDUCATION/TRAINING PROGRAM

## 2023-06-08 PROCEDURE — 64718 PR REVISE ULNAR NERVE AT ELBOW: ICD-10-PCS | Mod: RT,,, | Performed by: STUDENT IN AN ORGANIZED HEALTH CARE EDUCATION/TRAINING PROGRAM

## 2023-06-08 PROCEDURE — 64415 PERIPHERAL BLOCK: ICD-10-PCS | Mod: 59,LT,, | Performed by: ANESTHESIOLOGY

## 2023-06-08 PROCEDURE — 64718 REVISE ULNAR NERVE AT ELBOW: CPT | Mod: RT,,, | Performed by: STUDENT IN AN ORGANIZED HEALTH CARE EDUCATION/TRAINING PROGRAM

## 2023-06-08 PROCEDURE — D9220A PRA ANESTHESIA: Mod: CRNA,,, | Performed by: NURSE ANESTHETIST, CERTIFIED REGISTERED

## 2023-06-08 PROCEDURE — 71000033 HC RECOVERY, INTIAL HOUR: Performed by: STUDENT IN AN ORGANIZED HEALTH CARE EDUCATION/TRAINING PROGRAM

## 2023-06-08 PROCEDURE — 64415 NJX AA&/STRD BRCH PLXS IMG: CPT | Mod: 59 | Performed by: ANESTHESIOLOGY

## 2023-06-08 PROCEDURE — 37000009 HC ANESTHESIA EA ADD 15 MINS: Performed by: STUDENT IN AN ORGANIZED HEALTH CARE EDUCATION/TRAINING PROGRAM

## 2023-06-08 PROCEDURE — 01710 ANES PX NRV MUSC UPR A&E NOS: CPT | Performed by: STUDENT IN AN ORGANIZED HEALTH CARE EDUCATION/TRAINING PROGRAM

## 2023-06-08 RX ORDER — OXYCODONE HYDROCHLORIDE 5 MG/1
5 TABLET ORAL
Status: DISCONTINUED | OUTPATIENT
Start: 2023-06-08 | End: 2023-06-08 | Stop reason: HOSPADM

## 2023-06-08 RX ORDER — MIDAZOLAM HYDROCHLORIDE 1 MG/ML
5 INJECTION INTRAMUSCULAR; INTRAVENOUS ONCE AS NEEDED
Status: COMPLETED | OUTPATIENT
Start: 2023-06-08 | End: 2023-06-08

## 2023-06-08 RX ORDER — PROPOFOL 10 MG/ML
VIAL (ML) INTRAVENOUS
Status: DISCONTINUED | OUTPATIENT
Start: 2023-06-08 | End: 2023-06-08

## 2023-06-08 RX ORDER — CEFAZOLIN SODIUM 1 G/3ML
INJECTION, POWDER, FOR SOLUTION INTRAMUSCULAR; INTRAVENOUS
Status: DISCONTINUED | OUTPATIENT
Start: 2023-06-08 | End: 2023-06-08

## 2023-06-08 RX ORDER — MIRTAZAPINE 7.5 MG/1
7.5 TABLET, FILM COATED ORAL NIGHTLY
Qty: 90 TABLET | Refills: 1 | Status: SHIPPED | OUTPATIENT
Start: 2023-06-08 | End: 2023-10-24 | Stop reason: SDUPTHER

## 2023-06-08 RX ORDER — DEXAMETHASONE SODIUM PHOSPHATE 4 MG/ML
INJECTION, SOLUTION INTRA-ARTICULAR; INTRALESIONAL; INTRAMUSCULAR; INTRAVENOUS; SOFT TISSUE
Status: DISCONTINUED | OUTPATIENT
Start: 2023-06-08 | End: 2023-06-08

## 2023-06-08 RX ORDER — ONDANSETRON 2 MG/ML
INJECTION INTRAMUSCULAR; INTRAVENOUS
Status: DISCONTINUED | OUTPATIENT
Start: 2023-06-08 | End: 2023-06-08

## 2023-06-08 RX ORDER — KETOROLAC TROMETHAMINE 30 MG/ML
INJECTION, SOLUTION INTRAMUSCULAR; INTRAVENOUS
Status: DISCONTINUED | OUTPATIENT
Start: 2023-06-08 | End: 2023-06-08

## 2023-06-08 RX ORDER — MIDAZOLAM HYDROCHLORIDE 1 MG/ML
INJECTION INTRAMUSCULAR; INTRAVENOUS
Status: COMPLETED
Start: 2023-06-08 | End: 2023-06-08

## 2023-06-08 RX ORDER — FENTANYL CITRATE 50 UG/ML
INJECTION, SOLUTION INTRAMUSCULAR; INTRAVENOUS
Status: DISCONTINUED | OUTPATIENT
Start: 2023-06-08 | End: 2023-06-08

## 2023-06-08 RX ORDER — CEFAZOLIN SODIUM 2 G/50ML
2 SOLUTION INTRAVENOUS
Status: DISCONTINUED | OUTPATIENT
Start: 2023-06-08 | End: 2023-06-08 | Stop reason: HOSPADM

## 2023-06-08 RX ORDER — LIDOCAINE HYDROCHLORIDE 20 MG/ML
INJECTION INTRAVENOUS
Status: DISCONTINUED | OUTPATIENT
Start: 2023-06-08 | End: 2023-06-08

## 2023-06-08 RX ORDER — MUPIROCIN 20 MG/G
OINTMENT TOPICAL
Status: DISCONTINUED | OUTPATIENT
Start: 2023-06-08 | End: 2023-06-08 | Stop reason: HOSPADM

## 2023-06-08 RX ORDER — SODIUM CHLORIDE, SODIUM LACTATE, POTASSIUM CHLORIDE, CALCIUM CHLORIDE 600; 310; 30; 20 MG/100ML; MG/100ML; MG/100ML; MG/100ML
INJECTION, SOLUTION INTRAVENOUS CONTINUOUS
Status: DISCONTINUED | OUTPATIENT
Start: 2023-06-08 | End: 2023-06-08 | Stop reason: HOSPADM

## 2023-06-08 RX ORDER — SODIUM CHLORIDE 9 MG/ML
INJECTION, SOLUTION INTRAVENOUS CONTINUOUS
Status: DISCONTINUED | OUTPATIENT
Start: 2023-06-08 | End: 2023-06-08 | Stop reason: HOSPADM

## 2023-06-08 RX ADMIN — MIDAZOLAM HYDROCHLORIDE 4 MG: 1 INJECTION, SOLUTION INTRAMUSCULAR; INTRAVENOUS at 11:06

## 2023-06-08 RX ADMIN — KETOROLAC TROMETHAMINE 30 MG: 30 INJECTION, SOLUTION INTRAMUSCULAR at 11:06

## 2023-06-08 RX ADMIN — OXYCODONE HYDROCHLORIDE 5 MG: 5 TABLET ORAL at 01:06

## 2023-06-08 RX ADMIN — LIDOCAINE HYDROCHLORIDE 100 MG: 20 INJECTION INTRAVENOUS at 11:06

## 2023-06-08 RX ADMIN — CEFAZOLIN 2 G: 330 INJECTION, POWDER, FOR SOLUTION INTRAMUSCULAR; INTRAVENOUS at 11:06

## 2023-06-08 RX ADMIN — FENTANYL CITRATE 50 MCG: 50 INJECTION INTRAMUSCULAR; INTRAVENOUS at 11:06

## 2023-06-08 RX ADMIN — DEXAMETHASONE SODIUM PHOSPHATE 8 MG: 4 INJECTION, SOLUTION INTRA-ARTICULAR; INTRALESIONAL; INTRAMUSCULAR; INTRAVENOUS; SOFT TISSUE at 11:06

## 2023-06-08 RX ADMIN — ONDANSETRON 4 MG: 2 INJECTION INTRAMUSCULAR; INTRAVENOUS at 11:06

## 2023-06-08 RX ADMIN — MIDAZOLAM HYDROCHLORIDE 4 MG: 1 INJECTION INTRAMUSCULAR; INTRAVENOUS at 11:06

## 2023-06-08 RX ADMIN — SODIUM CHLORIDE, POTASSIUM CHLORIDE, SODIUM LACTATE AND CALCIUM CHLORIDE: 600; 310; 30; 20 INJECTION, SOLUTION INTRAVENOUS at 11:06

## 2023-06-08 RX ADMIN — PROPOFOL 180 MG: 10 INJECTION, EMULSION INTRAVENOUS at 11:06

## 2023-06-08 NOTE — PROGRESS NOTES
Faculty Attestation  Preop Dx: Right carpal tunnel and cubital tunnel syndrome  Plan:  Right carpal tunnel and cubital tunnel release  I agree with the resident's History & Physical.  Proceed with case.    Shola Hunter  Orthopaedic Surgery

## 2023-06-08 NOTE — ANESTHESIA PROCEDURE NOTES
Peripheral Block    Patient location during procedure: pre-op   Block not for primary anesthetic.  Reason for block: at surgeon's request and post-op pain management   Post-op Pain Location: left elbow anf wrist   Start time: 6/8/2023 11:12 AM  Timeout: 6/8/2023 11:11 AM   End time: 6/8/2023 11:15 AM    Staffing  Authorizing Provider: Lauren Mendoza MD  Performing Provider: Lauren Mendoza MD    Preanesthetic Checklist  Completed: patient identified, IV checked, site marked, risks and benefits discussed, surgical consent, monitors and equipment checked, pre-op evaluation and timeout performed  Peripheral Block  Patient position: supine  Prep: ChloraPrep  Patient monitoring: heart rate, cardiac monitor, continuous pulse ox, continuous capnometry and frequent blood pressure checks  Block type: supraclavicular  Laterality: left  Injection technique: single shot  Needle  Needle type: Stimuplex   Needle gauge: 22 G  Needle length: 2 in  Needle localization: anatomical landmarks and ultrasound guidance   -ultrasound image captured on disc.  Assessment  Injection assessment: negative aspiration, negative parasthesia and local visualized surrounding nerve  Paresthesia pain: none  Heart rate change: no  Slow fractionated injection: yes        Additional Notes  VSS.  [facility] RN monitoring vitals throughout procedure.  Patient tolerated procedure well.     1:400,000 epi added

## 2023-06-08 NOTE — LETTER
Radha Junior accompanied her mother  to the hospital on 6/06/2023 Please excuse her from work 6/08/2023 and 06/09/2023.  I you have any questions or concerns, please don't hesitate to call.      Maira IVAN @ [phone]

## 2023-06-08 NOTE — ANESTHESIA PROCEDURE NOTES
Intubation    Date/Time: 6/8/2023 11:29 AM  Performed by: Anahi Amato CRNA  Authorized by: Lauren Mendoza MD     Intubation:     Induction:  Intravenous    Intubated:  Postinduction    Mask Ventilation:  Not attempted    Attempts:  1    Attempted By:  CRNA    Difficult Airway Encountered?: No      Complications:  None    Airway Device:  Supraglottic airway/LMA    Airway Device Size:  4.0    Style/Cuff Inflation:  Uncuffed    Placement Verified By:  Capnometry    Complicating Factors:  None    Findings Post-Intubation:  BS equal bilateral and atraumatic/condition of teeth unchanged

## 2023-06-08 NOTE — OP NOTE
Operative Note    Patient Information:  Cindy Garcia    Date of Surgery:  06/08/2023    Surgeon:  Shola Hunter MD    Assistant:  Eric Cordero MD PGY5    Pre-operative Diagnosis:  Right Carpal tunnel and Cubital tunnel syndromes    Post-operative Diagnosis:  same    Procedure Performed:  Right Carpal tunnel release (CPT 99360)  Right Open in situ cubital tunnel release (CPT 15233)    Anesthesia:  General    Complications:  None    Blood Loss:  10ml    Specimens:  None    Implants:  None    Indications for Procedure:  Cindy Garcia is a 37 y.o. female that has right carpal and cubital tunnel syndromes.     I explained that surgery and the nature of their condition are not without risks. These include, but are not limited to, bleeding, infection, neurovascular compromise, wound complications, scarring, cosmetic defects, need for later and/or repeated surgeries, pain, loss of ROM, loss of function, deformity, functional abnormalities, stiffness, thromboembolic complications, compartment syndrome, loss of limb, loss of life, anesthetic complications, and other imponderables. I explained that these can occur despite the adequacy of treatments rendered, and that their risks are heightened given the nature of their condition. They verbalized understanding. They would like to continue with surgery at this time. If appropriate family was involved with surgical discussion.  The patient expressed understanding of and agreement with the plan. Informed consent was obtained and signed prior going to the operative room.    Procedure in Detail:  Patient was brought to the operating room and placed under anesthesia by the anesthesia department without difficulty. The patient was then placed in the supine position on the operating room table.     Time out was performed and all parties present agreed with correct patient, correct procedure, correct side, correct site. The operative extremity was prepped and  draped in standard normal fashion.     Pre-incision antibiotics were administered prior to skin incision.    A curvilinear incision was made over the medial elbow between the medial epicondyle and olecranon.  Dissection was carefully taken through subcutaneous tissue with care to avoid any injury to crossing cutaneous branches specifically the medial antebrachial cutaneous nerve.  Dissection was carried down to the ulnar nerve at the medial epicondyle.  The ligament of [place] was released and the nerve was freed at the level of the medial epicondyle.  Under direct visualization, the FCU fascia was carefully released.   The sheath above the ulnar nerve was carefully released to the level of the 1st motor branch under direct visualization.    Attention was turned proximally. Under direct visualization the arcade of [place] was released.  Sheath above the ulnar nerve was also released to the mid brachium.    The elbow was ranged and the nerve was found to not sublux.    The wound was copiously irrigated. The skin was closed with 3-0 Monocryl and 3-0 nylon suture.    An approximately 2.0 centimeter longitudinal incision was made beginning 0.5 centimeters distal to the volar wrist crease extending distalward in line with the radial aspect of the ring finger ray.  Careful dissection was performed in order to preserve and to protect the palmar cutaneous branch of the median nerve. Bipolar electrocautery was used for hemostasis.  The palmar aponeurosis was divided longitudinally to expose the underlying convergence of the volar carpal ligament and the thenar fascia. The transverse carpal ligament was exposed.  Under direct visualization the transverse carpal ligament was divided from its distal most aspect to the level of the volar wrist crease with care is to protect the median nerve, the ulnar neurovascular structures, the superficial palmar arch, and the thenar motor branch of the median nerve.  The distal 3 to 4  centimeters of the antebrachial forearm fascia were divided longitudinally under direct visualization using curved tenotomy scissors contiguous with the transverse carpal ligament incision site. The nerve was observed to be free of compression or constriction from the surrounding tissues throughout its course in the distal forearm to the mid-palm.     The median nerve was noted to be intact and was without evidence of constrictive deformity.  There was no evidence of significant flexor tenosynovitis nor evidence of an intra-carpal canal mass.    Incision was copiously irrigated and closed with 4-0 nylon and 5-0 plain gut suture. A soft dressing was applied.  Patient was extubated without complications and transferred to the recovery room in stable condition.       Post-operative Plan:  The patient tolerated the procedure and anesthesia well and was taken to the postoperative care unit in good and stable condition.  Postoperatively, the digits were pink and well-perfused and the hand compartments and soft tissues were supple.  The dressings were clean and dry.     Postoperative discharge instructions were reviewed with the patient and family / responsible party accompanying the patient today.  Instructions for strict hand elevation over the next 48 hours and as needed to minimize swelling, loosening of the dressing as needed for swelling or tightness, general wound care instructions, activity restrictions, and postoperative medication use / analgesic use have been advised.  The patient has been instructed to keep the incision dry and to keep dressing on until clinic follow up in 2 weeks.      Activity and work restrictions have been advised for the followin. No work for 2 to 7 days postoperatively tolerated; 2. Incision is to remain covered at all times while at work; 3. No lifting, pushing, pulling of greater than 1 pound for 2 weeks, advancing to no more than 2 to 3 pounds until week 4, then the patient may  progress with activity as tolerated with anticipated discharge to full activity at 6 to 8 weeks postoperatively; 4.  no repetitive use of the hands and wrists for 4 weeks postoperatively; 5. No climbing or use of heavy machinery until released to full activity.

## 2023-06-08 NOTE — TRANSFER OF CARE
Anesthesia Transfer of Care Note    Patient: Cindy Garcia    Procedure(s) Performed: Procedure(s) (LRB):  RELEASE, CARPAL TUNNEL AND CUBITAL TUNNEL RELEASE (Right)    Patient location: PACU    Anesthesia Type: general    Transport from OR: Transported from OR on room air with adequate spontaneous ventilation    Post pain: adequate analgesia    Post assessment: no apparent anesthetic complications    Post vital signs: stable    Level of consciousness: sedated    Nausea/Vomiting: no nausea/vomiting    Complications: none    Transfer of care protocol was followed      Last vitals:

## 2023-06-09 VITALS
TEMPERATURE: 98 F | BODY MASS INDEX: 38.16 KG/M2 | OXYGEN SATURATION: 97 % | WEIGHT: 215.38 LBS | DIASTOLIC BLOOD PRESSURE: 84 MMHG | SYSTOLIC BLOOD PRESSURE: 120 MMHG | HEART RATE: 71 BPM | RESPIRATION RATE: 18 BRPM

## 2023-06-10 NOTE — DISCHARGE SUMMARY
[facility] Periop Services  Discharge Note  Short Stay    Procedure(s) (LRB):  RELEASE, CARPAL TUNNEL (Right)  RELEASE, CUBITAL TUNNEL (Right)      OUTCOME: Patient tolerated treatment/procedure well without complication and is now ready for discharge.    DISPOSITION: Home or Self Care    FINAL DIAGNOSIS:  <principal problem not specified>    FOLLOWUP: In clinic    DISCHARGE INSTRUCTIONS:  No discharge procedures on file.      Clinical Reference Documents Added to Patient Instructions         Document    CARPAL TUNNEL RELEASE (ENGLISH)    CUBITAL TUNNEL SYNDROME DISCHARGE INSTRUCTIONS (ENGLISH)            TIME SPENT ON DISCHARGE:  5 minutes

## 2023-06-13 ENCOUNTER — PATIENT MESSAGE (OUTPATIENT)
Dept: ORTHOPEDICS | Facility: CLINIC | Age: 38
End: 2023-06-13

## 2023-06-14 DIAGNOSIS — F51.01 PRIMARY INSOMNIA: ICD-10-CM

## 2023-06-14 RX ORDER — MIRTAZAPINE 7.5 MG/1
7.5 TABLET, FILM COATED ORAL NIGHTLY
Qty: 90 TABLET | Refills: 1 | Status: CANCELLED | OUTPATIENT
Start: 2023-06-14 | End: 2024-06-13

## 2023-06-21 ENCOUNTER — OFFICE VISIT (OUTPATIENT)
Dept: ORTHOPEDICS | Facility: CLINIC | Age: 38
End: 2023-06-21
Payer: MEDICAID

## 2023-06-21 ENCOUNTER — HOSPITAL ENCOUNTER (OUTPATIENT)
Dept: RADIOLOGY | Facility: HOSPITAL | Age: 38
Discharge: HOME | End: 2023-06-21
Attending: STUDENT IN AN ORGANIZED HEALTH CARE EDUCATION/TRAINING PROGRAM
Payer: MEDICAID

## 2023-06-21 VITALS — HEIGHT: 63 IN | WEIGHT: 215 LBS | RESPIRATION RATE: 18 BRPM | BODY MASS INDEX: 38.09 KG/M2

## 2023-06-21 DIAGNOSIS — M79.601 RIGHT ARM PAIN: Primary | ICD-10-CM

## 2023-06-21 DIAGNOSIS — M79.601 RIGHT ARM PAIN: ICD-10-CM

## 2023-06-21 PROCEDURE — 1159F MED LIST DOCD IN RCRD: CPT | Mod: CPTII,,, | Performed by: STUDENT IN AN ORGANIZED HEALTH CARE EDUCATION/TRAINING PROGRAM

## 2023-06-21 PROCEDURE — 3008F BODY MASS INDEX DOCD: CPT | Mod: CPTII,,, | Performed by: STUDENT IN AN ORGANIZED HEALTH CARE EDUCATION/TRAINING PROGRAM

## 2023-06-21 PROCEDURE — 99024 PR POST-OP FOLLOW-UP VISIT: ICD-10-PCS | Mod: ,,, | Performed by: STUDENT IN AN ORGANIZED HEALTH CARE EDUCATION/TRAINING PROGRAM

## 2023-06-21 PROCEDURE — 99214 OFFICE O/P EST MOD 30 MIN: CPT | Mod: PBBFAC

## 2023-06-21 PROCEDURE — 99024 POSTOP FOLLOW-UP VISIT: CPT | Mod: ,,, | Performed by: STUDENT IN AN ORGANIZED HEALTH CARE EDUCATION/TRAINING PROGRAM

## 2023-06-21 PROCEDURE — 1159F PR MEDICATION LIST DOCUMENTED IN MEDICAL RECORD: ICD-10-PCS | Mod: CPTII,,, | Performed by: STUDENT IN AN ORGANIZED HEALTH CARE EDUCATION/TRAINING PROGRAM

## 2023-06-21 PROCEDURE — 3008F PR BODY MASS INDEX (BMI) DOCUMENTED: ICD-10-PCS | Mod: CPTII,,, | Performed by: STUDENT IN AN ORGANIZED HEALTH CARE EDUCATION/TRAINING PROGRAM

## 2023-06-21 NOTE — PROGRESS NOTES
[facility]  Established Patient Office Visit  06/21/2023       Patient ID: Cindy Garcia  YOB: 1985  MRN: [id number]    Status post right carpal tunnel cubital tunnel release 06/08/2023     Patient here today for 1st postoperative visit.  Two weeks out from surgery.  Doing well.  Still complaining of some throbbing pain along the elbow and wrist.  Feels like numbness and tingling is somewhat better but unsure because of the pain.  Wounds have healed well no drainage or other concern for infection.  Denies fever chills shortness of breath or chest pains.  No other issues.    Past Medical History:    Past Medical History:   Diagnosis Date    Carpal tunnel syndrome     Cluster headaches     Cubital tunnel syndrome     Diabetes mellitus     Digestive disorder     Diverticulosis     Dysphagia     Fibromyalgia     Fluid retention     GERD (gastroesophageal reflux disease)     Graves disease     Graves' disease     Hypertension     Meniere disease     Neuropathy     PE (pulmonary thromboembolism)     Sciatic nerve pain     Sleep apnea     TMJ (dislocation of temporomandibular joint)      Past Surgical History:   Procedure Laterality Date    ADENOIDECTOMY N/A     CARPAL TUNNEL RELEASE Left 5/19/2022    Procedure: RELEASE, CARPAL TUNNEL;  Surgeon: Eben Bolanos MD;  Location: [facility];  Service: Orthopedics;  Laterality: Left;    CARPAL TUNNEL RELEASE Right 6/8/2023    Procedure: RELEASE, CARPAL TUNNEL AND CUBITAL TUNNEL RELEASE;  Surgeon: Shola Hunter MD;  Location: [facility];  Service: Orthopedics;  Laterality: Right;    CHOLECYSTECTOMY N/A     laryngoscopy and other tracheoscopy N/A 12/23/2014    ULNAR TUNNEL RELEASE Left 5/19/2022    Procedure: RELEASE, CUBITAL TUNNEL;  Surgeon: Eben Bolanos MD;  Location: [facility];  Service: Orthopedics;  Laterality: Left;    ULNAR TUNNEL RELEASE Right 6/8/2023    Procedure: RELEASE, CUBITAL TUNNEL;  Surgeon: Shola Hunter MD;   Location: [facility];  Service: Orthopedics;  Laterality: Right;     Family History   Problem Relation Age of Onset    Diabetes Mother     COPD Mother     Hypertension Mother     Arthritis Mother     Kidney disease Mother     Vision loss Mother     Asthma Daughter     Cancer Paternal Aunt     Stroke Paternal Aunt      Social History     Socioeconomic History    Marital status:    Tobacco Use    Smoking status: Never    Smokeless tobacco: Never   Substance and Sexual Activity    Alcohol use: Not Currently    Drug use: Never    Sexual activity: Yes     Medication List with Changes/Refills   Current Medications    (MAGIC MOUTHWASH) 1:1:1 DIPHENHYDRAMINE(BENADRYL) 12.5MG/5ML LIQ, ALUMINUM & MAGNESIUM HYDROXIDE-SIMETHICONE (MAALOX), LIDOCAINE VISCOUS 2%    Swish and spit 15 mLs every 4 (four) hours as needed (thrush). for mouth sores    ALBUTEROL (PROVENTIL/VENTOLIN HFA) 90 MCG/ACTUATION INHALER    2 puffs, Inhale, every 6 hr, PRN as needed for wheezing, # 8.5 g, 0 Refill(s), Start Date: 09/01/22 1:44:00 CDT    APIXABAN (ELIQUIS) 5 MG TAB    Take 1 tablet (5 mg total) by mouth once daily.    BUSPIRONE (BUSPAR) 5 MG TAB    Take 1 tablet (5 mg total) by mouth 2 (two) times daily.    CETIRIZINE (ZYRTEC) 10 MG TABLET    Take 11 tablets by mouth once daily.    CHOLECALCIFEROL, VITAMIN D3, 1,250 MCG (50,000 UNIT) CAPSULE    Take 1 capsule (50,000 Units total) by mouth twice a week.    DILTIAZEM (CARDIZEM CD) 120 MG CP24    Take 1 capsule (120 mg total) by mouth once daily.    FEROSUL 325 MG (65 MG IRON) TAB TABLET    Take 1 tablet by mouth once daily.    FUROSEMIDE (LASIX) 20 MG TABLET    Take 1 tablet (20 mg total) by mouth once daily.    GABAPENTIN (NEURONTIN) 800 MG TABLET    ONE TABLET (BY MOUTH) THREE TIMES A DAY    GALCANEZUMAB-GNLM (EMGALITY PEN) 120 MG/ML PNIJ    Inject 120 mg into the skin every 28 days.    HYDROXYZINE HCL (ATARAX) 25 MG TABLET    Take 1 tablet (25 mg total) by mouth 3 (three) times daily as  needed for Anxiety.    LACTULOSE (CHRONULAC) 20 GRAM/30 ML SOLN    Take 30 mLs (20 g total) by mouth 3 (three) times daily as needed (constipation).    LASMIDITAN (REYVOW) TABLET 100 MG    TAKE 2 TABLETS (BY MOUTH) EVERY DAY AS NEEDED FOR SEVERE HEADACHES ONLY. ALLOW AT LEAST 24 HOURS BETWEEN DOSES.    LINACLOTIDE (LINZESS) 72 MCG CAP CAPSULE    Take 1 capsule (72 mcg total) by mouth before breakfast.    METFORMIN (GLUCOPHAGE-XR) 500 MG ER 24HR TABLET    TAKE 2 TABLETS (BY MOUTH) EVERY MORNING    METHOCARBAMOL (ROBAXIN) 500 MG TAB    Take 1 tablet (500 mg total) by mouth 4 (four) times daily as needed (muscle spasms).    METOCLOPRAMIDE HCL (REGLAN) 5 MG TABLET    Take 1 tablet (5 mg total) by mouth 4 (four) times daily.    MIRTAZAPINE (REMERON) 7.5 MG TAB    Take 1 tablet (7.5 mg total) by mouth nightly.    ONDANSETRON (ZOFRAN) 8 MG TABLET    Take 1 tablet (8 mg total) by mouth every 8 (eight) hours as needed for Nausea.    PANTOPRAZOLE (PROTONIX) 40 MG TABLET    Take 1 tablet (40 mg total) by mouth once daily.    POTASSIUM CHLORIDE (KLOR-CON) 10 MEQ TBSR    Take 1 tablet (10 mEq total) by mouth 2 (two) times daily.    TOPIRAMATE (TOPAMAX) 100 MG TABLET    Take 1 tablet (100 mg total) by mouth 2 (two) times daily.    TOPIRAMATE (TOPAMAX) 25 MG TABLET    Take 1 tablet (25 mg total) by mouth 2 (two) times daily.    UBROGEPANT (UBRELVY) 100 MG TABLET    Take 1 tablet (100 mg total) by mouth daily as needed for Migraine (May repeat in 2 hrs; no more than 2 tabs in 24 hours).     Review of patient's allergies indicates:  No Known Allergies    ROS:    Body mass index is 38.09 kg/m².  GENERAL: Well appearing, appropriate for stated age, no acute distress.  CARDIOVASCULAR: Pulses regular by peripheral palpation.  PULMONARY: Respirations are even and non-labored.  NEURO: Awake, alert, and oriented x 3.  PSYCH: Mood & affect are appropriate.  HEENT: Head is normocephalic and atraumatic.    Physical Exam:  Right upper  extremity   Incisions well approximated with nylon suture   Appropriate postoperative tenderness and swelling   Sensation intact to light touch M/U/R   Motor intact ain/pin/U   Thenar and hypothenar strength 5/5   Well-perfused distally    EMG conclusion read as abnormal electrodiagnostic study of the right upper extremity consistent with:  1.  Sensory motor polyneuropathy 2.  Mild right median neuropathy at the wrist 3.  Mild right ulnar neuropathy at the wrist.    Imaging:    Relevant imaging results reviewed and interpreted by me, discussed with the patient and / or family today. None    Assessment and Plan:    Cindy Garcia is a 37 y.o. female seen in the office today for The encounter diagnosis was Right arm pain..     36F, LHD, with PMH L carpal/cubital syndrome s/p releases 5/19/2022, now presenting with right carpal tunnel and right cubital tunnel symptoms.  Status post right carpal cubital release 06/08/2023     -sutures out today, Steri-Strips in clinic   -patient overall doing well.  Discussed restrictions lifting no more than 2 lb for the next 2 weeks then can progress to 4-6 lb up to 6 weeks.  Released to full activity after this  -discussed range-of-motion exercises and scar massage  -Tylenol for pain as needed   -return 1 month for re-evaluation; if any issues at this point get into therapy    Arsh Sonwden

## 2023-06-29 NOTE — ADDENDUM NOTE
Addended by: LALITHA VENEGAS on: 6/8/2022 09:39 AM     Modules accepted: Orders     If symptoms worsen

## 2023-07-10 PROBLEM — N34.2 INFECTIVE URETHRITIS: Status: RESOLVED | Noted: 2023-04-05 | Resolved: 2023-07-10

## 2023-07-25 RX ORDER — METFORMIN HYDROCHLORIDE 500 MG/1
TABLET, EXTENDED RELEASE ORAL
Qty: 90 TABLET | Refills: 1 | Status: SHIPPED | OUTPATIENT
Start: 2023-07-25 | End: 2023-09-20 | Stop reason: SDUPTHER

## 2023-08-04 ENCOUNTER — OFFICE VISIT (OUTPATIENT)
Dept: ORTHOPEDICS | Facility: CLINIC | Age: 38
End: 2023-08-04
Payer: MEDICAID

## 2023-08-04 VITALS — HEIGHT: 63 IN | BODY MASS INDEX: 38.09 KG/M2 | WEIGHT: 215 LBS

## 2023-08-04 DIAGNOSIS — G56.21 CUBITAL TUNNEL SYNDROME ON RIGHT: Primary | ICD-10-CM

## 2023-08-04 PROCEDURE — 3008F BODY MASS INDEX DOCD: CPT | Mod: CPTII,,, | Performed by: ORTHOPAEDIC SURGERY

## 2023-08-04 PROCEDURE — 99024 POSTOP FOLLOW-UP VISIT: CPT | Mod: ,,, | Performed by: ORTHOPAEDIC SURGERY

## 2023-08-04 PROCEDURE — 1159F MED LIST DOCD IN RCRD: CPT | Mod: CPTII,,, | Performed by: ORTHOPAEDIC SURGERY

## 2023-08-04 PROCEDURE — 99214 OFFICE O/P EST MOD 30 MIN: CPT | Mod: PBBFAC

## 2023-08-04 PROCEDURE — 3008F PR BODY MASS INDEX (BMI) DOCUMENTED: ICD-10-PCS | Mod: CPTII,,, | Performed by: ORTHOPAEDIC SURGERY

## 2023-08-04 PROCEDURE — 3044F PR MOST RECENT HEMOGLOBIN A1C LEVEL <7.0%: ICD-10-PCS | Mod: CPTII,,, | Performed by: ORTHOPAEDIC SURGERY

## 2023-08-04 PROCEDURE — 1159F PR MEDICATION LIST DOCUMENTED IN MEDICAL RECORD: ICD-10-PCS | Mod: CPTII,,, | Performed by: ORTHOPAEDIC SURGERY

## 2023-08-04 PROCEDURE — 3044F HG A1C LEVEL LT 7.0%: CPT | Mod: CPTII,,, | Performed by: ORTHOPAEDIC SURGERY

## 2023-08-04 PROCEDURE — 99024 PR POST-OP FOLLOW-UP VISIT: ICD-10-PCS | Mod: ,,, | Performed by: ORTHOPAEDIC SURGERY

## 2023-08-04 RX ORDER — MENTHOL 40 MG/ML
2 GEL TOPICAL 2 TIMES DAILY
Qty: 200 ML | Refills: 0 | Status: SHIPPED | OUTPATIENT
Start: 2023-08-04 | End: 2023-10-24

## 2023-08-04 RX ORDER — DICLOFENAC SODIUM 10 MG/G
2 GEL TOPICAL 4 TIMES DAILY
Qty: 200 G | Refills: 1 | Status: SHIPPED | OUTPATIENT
Start: 2023-08-04 | End: 2023-10-24

## 2023-08-04 NOTE — PROGRESS NOTES
[facility]  Established Patient Office Visit  08/04/2023       Patient ID: Cindy Garcia  YOB: 1985  MRN: [id number]    Chief Complaint: Pain and Post-op Evaluation of the Right Hand      Past Orthopaedic Surgeries:   L carpal/cubital syndrome s/p releases 5/19/2022  right carpal tunnel cubital tunnel release 06/08/2023     HPI:  Cindy Garcia is a 37 y.o. left-hand dominant female who is now 2 months s/p right carpal/cubital tunnel release.  Her left side is overall doing very well.  She reports that her right-sided numbness, tingling, and nighttime pain in her fingertips has resolved.  She continues to have mild incisional  pain over her carpal tunnel incision site.  Her elbow feels great.  She is had no signs of local or systemic infection.  She is slightly frustrated with her pillar pain.    Past Medical History:    Past Medical History:   Diagnosis Date    Carpal tunnel syndrome     Cluster headaches     Cubital tunnel syndrome     Diabetes mellitus     Digestive disorder     Diverticulosis     Dysphagia     Fibromyalgia     Fluid retention     GERD (gastroesophageal reflux disease)     Graves disease     Graves' disease     Hypertension     Meniere disease     Neuropathy     PE (pulmonary thromboembolism)     Sciatic nerve pain     Sleep apnea     TMJ (dislocation of temporomandibular joint)      Past Surgical History:   Procedure Laterality Date    ADENOIDECTOMY N/A     CARPAL TUNNEL RELEASE Left 5/19/2022    Procedure: RELEASE, CARPAL TUNNEL;  Surgeon: Eben Bolanos MD;  Location: [facility];  Service: Orthopedics;  Laterality: Left;    CARPAL TUNNEL RELEASE Right 6/8/2023    Procedure: RELEASE, CARPAL TUNNEL AND CUBITAL TUNNEL RELEASE;  Surgeon: Shola Hunter MD;  Location: [facility];  Service: Orthopedics;  Laterality: Right;    CHOLECYSTECTOMY N/A     laryngoscopy and other tracheoscopy N/A 12/23/2014    ULNAR TUNNEL RELEASE Left 5/19/2022     Procedure: RELEASE, CUBITAL TUNNEL;  Surgeon: Eben Bolanos MD;  Location: [facility];  Service: Orthopedics;  Laterality: Left;    ULNAR TUNNEL RELEASE Right 6/8/2023    Procedure: RELEASE, CUBITAL TUNNEL;  Surgeon: Shola Hunter MD;  Location: [facility] OR;  Service: Orthopedics;  Laterality: Right;     Family History   Problem Relation Age of Onset    Diabetes Mother     COPD Mother     Hypertension Mother     Arthritis Mother     Kidney disease Mother     Vision loss Mother     Asthma Daughter     Cancer Paternal Aunt     Stroke Paternal Aunt      Social History     Socioeconomic History    Marital status:    Tobacco Use    Smoking status: Never    Smokeless tobacco: Never   Substance and Sexual Activity    Alcohol use: Not Currently    Drug use: Never    Sexual activity: Yes     Medication List with Changes/Refills   New Medications    DICLOFENAC SODIUM (VOLTAREN) 1 % GEL    Apply 2 g topically 4 (four) times daily.    MENTHOL (BIOFREEZE, MENTHOL,) 4 % GEL    Apply 2 g topically 2 (two) times a day.   Current Medications    (MAGIC MOUTHWASH) 1:1:1 DIPHENHYDRAMINE(BENADRYL) 12.5MG/5ML LIQ, ALUMINUM & MAGNESIUM HYDROXIDE-SIMETHICONE (MAALOX), LIDOCAINE VISCOUS 2%    Swish and spit 15 mLs every 4 (four) hours as needed (thrush). for mouth sores    ALBUTEROL (PROVENTIL/VENTOLIN HFA) 90 MCG/ACTUATION INHALER    2 puffs, Inhale, every 6 hr, PRN as needed for wheezing, # 8.5 g, 0 Refill(s), Start Date: 09/01/22 1:44:00 CDT    APIXABAN (ELIQUIS) 5 MG TAB    Take 1 tablet (5 mg total) by mouth once daily.    BUSPIRONE (BUSPAR) 5 MG TAB    Take 1 tablet (5 mg total) by mouth 2 (two) times daily.    CETIRIZINE (ZYRTEC) 10 MG TABLET    Take 11 tablets by mouth once daily.    CHOLECALCIFEROL, VITAMIN D3, 1,250 MCG (50,000 UNIT) CAPSULE    Take 1 capsule (50,000 Units total) by mouth twice a week.    DILTIAZEM (CARDIZEM CD) 120 MG CP24    Take 1 capsule (120 mg total) by mouth once daily.    FEROSUL 325 MG (65 MG IRON)  TAB TABLET    Take 1 tablet by mouth once daily.    FUROSEMIDE (LASIX) 20 MG TABLET    Take 1 tablet (20 mg total) by mouth once daily.    GABAPENTIN (NEURONTIN) 800 MG TABLET    ONE TABLET (BY MOUTH) THREE TIMES A DAY    GALCANEZUMAB-GNLM (EMGALITY PEN) 120 MG/ML PNIJ    Inject 120 mg into the skin every 28 days.    HYDROXYZINE HCL (ATARAX) 25 MG TABLET    Take 1 tablet (25 mg total) by mouth 3 (three) times daily as needed for Anxiety.    LACTULOSE (CHRONULAC) 20 GRAM/30 ML SOLN    Take 30 mLs (20 g total) by mouth 3 (three) times daily as needed (constipation).    LASMIDITAN (REYVOW) TABLET 100 MG    TAKE 2 TABLETS (BY MOUTH) EVERY DAY AS NEEDED FOR SEVERE HEADACHES ONLY. ALLOW AT LEAST 24 HOURS BETWEEN DOSES.    LINACLOTIDE (LINZESS) 72 MCG CAP CAPSULE    Take 1 capsule (72 mcg total) by mouth before breakfast.    METFORMIN (GLUCOPHAGE-XR) 500 MG ER 24HR TABLET    TAKE 2 TABLETS (BY MOUTH) EVERY MORNING    METHOCARBAMOL (ROBAXIN) 500 MG TAB    Take 1 tablet (500 mg total) by mouth 4 (four) times daily as needed (muscle spasms).    METOCLOPRAMIDE HCL (REGLAN) 5 MG TABLET    Take 1 tablet (5 mg total) by mouth 4 (four) times daily.    MIRTAZAPINE (REMERON) 7.5 MG TAB    Take 1 tablet (7.5 mg total) by mouth nightly.    ONDANSETRON (ZOFRAN) 8 MG TABLET    Take 1 tablet (8 mg total) by mouth every 8 (eight) hours as needed for Nausea.    PANTOPRAZOLE (PROTONIX) 40 MG TABLET    Take 1 tablet (40 mg total) by mouth once daily.    POTASSIUM CHLORIDE (KLOR-CON) 10 MEQ TBSR    Take 1 tablet (10 mEq total) by mouth 2 (two) times daily.    TOPIRAMATE (TOPAMAX) 100 MG TABLET    Take 1 tablet (100 mg total) by mouth 2 (two) times daily.    TOPIRAMATE (TOPAMAX) 25 MG TABLET    Take 1 tablet (25 mg total) by mouth 2 (two) times daily.    UBROGEPANT (UBRELVY) 100 MG TABLET    Take 1 tablet (100 mg total) by mouth daily as needed for Migraine (May repeat in 2 hrs; no more than 2 tabs in 24 hours).     Review of patient's  allergies indicates:  No Known Allergies    Physical Exam:    Right  upper extremity  Elbow and wrist incision sites well healed without signs of infection.  No erythema, discharge, open wound   No significant scarring over her carpal tunnel incision site   Mild hyperesthesia and significant discomfort over the radial and ulnar borders of the carpal tunnel incision site.  No pain over the elbow  Motor intact: ain/pin/m/u/r  [place]: M/U/R without numbness, tingling, or asymmetry  Full ROM of shoulder, elbow, wrist, fingers.  Patient has some discomfort with range of motion of the wrist  2+ radial pulse    Imaging:  No new imaging    Assessment and Plan:    Cindy Garcia is a 37 y.o. left-hand dominant female most recently s/p right carpal/cubital tunnel release on 06/08/2023.  She has improving pain over her right carpal tunnel incisional area.    - Biofreeze and Voltaren gel order   - patient instructed to perform vigorous massage over the carpal tunnel incision site  - occupational therapy for pillar massage and wrist/hand range of motion  - follow up in 6 weeks    Jim Olmos  [facility] Orthopaedic Surgery PGY-3          Orders Placed This Encounter    diclofenac sodium (VOLTAREN) 1 % Gel    menthol (BIOFREEZE, MENTHOL,) 4 % Gel

## 2023-08-10 NOTE — PROGRESS NOTES
Faculty Attestation: Cindy Amyhernando Garcia  was seen at [facility] in the Orthopaedic Clinic. Discussed with the resident at the time of the visit. History of Present Illness, Physical Exam, and Assessment and Plan reviewed. Treatment plan is reasonable and appropriate. Compliance with treatment recommendations is important. No procedure was performed.     Binh Siddiqi MD  Orthopaedic Surgery

## 2023-09-15 RX ORDER — FUROSEMIDE 20 MG/1
20 TABLET ORAL DAILY
Qty: 90 TABLET | Refills: 1 | Status: SHIPPED | OUTPATIENT
Start: 2023-09-15 | End: 2023-10-24 | Stop reason: SDUPTHER

## 2023-09-20 RX ORDER — METFORMIN HYDROCHLORIDE 500 MG/1
TABLET, EXTENDED RELEASE ORAL
Qty: 90 TABLET | Refills: 1 | Status: SHIPPED | OUTPATIENT
Start: 2023-09-20 | End: 2023-11-16 | Stop reason: SDUPTHER

## 2023-09-27 RX ORDER — DILTIAZEM HYDROCHLORIDE 120 MG/1
120 CAPSULE, COATED, EXTENDED RELEASE ORAL DAILY
Qty: 90 CAPSULE | Refills: 1 | Status: SHIPPED | OUTPATIENT
Start: 2023-09-27

## 2023-10-02 DIAGNOSIS — R52 GENERALIZED PAIN: ICD-10-CM

## 2023-10-02 RX ORDER — METHOCARBAMOL 500 MG/1
500 TABLET, FILM COATED ORAL 4 TIMES DAILY PRN
Qty: 90 TABLET | Refills: 1 | Status: CANCELLED | OUTPATIENT
Start: 2023-10-02 | End: 2024-10-01

## 2023-10-10 DIAGNOSIS — R52 GENERALIZED PAIN: ICD-10-CM

## 2023-10-10 RX ORDER — GABAPENTIN 800 MG/1
TABLET ORAL
Qty: 270 TABLET | Refills: 1 | Status: SHIPPED | OUTPATIENT
Start: 2023-10-10

## 2023-10-16 ENCOUNTER — LAB VISIT (OUTPATIENT)
Dept: LAB | Facility: HOSPITAL | Age: 38
End: 2023-10-16
Payer: MEDICAID

## 2023-10-16 DIAGNOSIS — E55.9 VITAMIN D DEFICIENCY: ICD-10-CM

## 2023-10-16 DIAGNOSIS — R73.03 PREDIABETES: ICD-10-CM

## 2023-10-16 DIAGNOSIS — D50.8 IRON DEFICIENCY ANEMIA SECONDARY TO INADEQUATE DIETARY IRON INTAKE: ICD-10-CM

## 2023-10-16 LAB
ALBUMIN SERPL-MCNC: 4 G/DL (ref 3.5–5)
ALBUMIN/GLOB SERPL: 1.1 RATIO (ref 1.1–2)
ALP SERPL-CCNC: 112 UNIT/L (ref 40–150)
ALT SERPL-CCNC: 19 UNIT/L (ref 0–55)
APPEARANCE UR: CLEAR
AST SERPL-CCNC: 14 UNIT/L (ref 5–34)
BACTERIA #/AREA URNS AUTO: ABNORMAL /HPF
BASOPHILS # BLD AUTO: 0.05 X10(3)/MCL
BASOPHILS NFR BLD AUTO: 0.7 %
BILIRUB SERPL-MCNC: 0.7 MG/DL
BILIRUB UR QL STRIP.AUTO: NEGATIVE
BUN SERPL-MCNC: 6.7 MG/DL (ref 7–18.7)
CALCIUM SERPL-MCNC: 9.5 MG/DL (ref 8.4–10.2)
CHLORIDE SERPL-SCNC: 112 MMOL/L (ref 98–107)
CO2 SERPL-SCNC: 24 MMOL/L (ref 22–29)
COLOR UR AUTO: COLORLESS
CREAT SERPL-MCNC: 0.99 MG/DL (ref 0.55–1.02)
CREAT UR-MCNC: 79.3 MG/DL (ref 45–106)
DEPRECATED CALCIDIOL+CALCIFEROL SERPL-MC: 16.2 NG/ML (ref 30–80)
EOSINOPHIL # BLD AUTO: 0.09 X10(3)/MCL (ref 0–0.9)
EOSINOPHIL NFR BLD AUTO: 1.3 %
ERYTHROCYTE [DISTWIDTH] IN BLOOD BY AUTOMATED COUNT: 13.2 % (ref 11.5–17)
EST. AVERAGE GLUCOSE BLD GHB EST-MCNC: 114 MG/DL
FERRITIN SERPL-MCNC: 136.89 NG/ML (ref 4.63–204)
GFR SERPLBLD CREATININE-BSD FMLA CKD-EPI: >60 MLS/MIN/1.73/M2
GLOBULIN SER-MCNC: 3.8 GM/DL (ref 2.4–3.5)
GLUCOSE SERPL-MCNC: 115 MG/DL (ref 74–100)
GLUCOSE UR QL STRIP.AUTO: NORMAL
HBA1C MFR BLD: 5.6 %
HCT VFR BLD AUTO: 39.5 % (ref 37–47)
HGB BLD-MCNC: 12.8 G/DL (ref 12–16)
HYALINE CASTS #/AREA URNS LPF: ABNORMAL /LPF
IMM GRANULOCYTES # BLD AUTO: 0.01 X10(3)/MCL (ref 0–0.04)
IMM GRANULOCYTES NFR BLD AUTO: 0.1 %
IRON SATN MFR SERPL: 14 % (ref 20–50)
IRON SERPL-MCNC: 37 UG/DL (ref 50–170)
KETONES UR QL STRIP.AUTO: NEGATIVE
LEUKOCYTE ESTERASE UR QL STRIP.AUTO: NEGATIVE
LYMPHOCYTES # BLD AUTO: 2.61 X10(3)/MCL (ref 0.6–4.6)
LYMPHOCYTES NFR BLD AUTO: 38.3 %
MCH RBC QN AUTO: 29 PG (ref 27–31)
MCHC RBC AUTO-ENTMCNC: 32.4 G/DL (ref 33–36)
MCV RBC AUTO: 89.4 FL (ref 80–94)
MICROALBUMIN UR-MCNC: <5 UG/ML
MICROALBUMIN/CREAT RATIO PNL UR: NORMAL
MONOCYTES # BLD AUTO: 0.29 X10(3)/MCL (ref 0.1–1.3)
MONOCYTES NFR BLD AUTO: 4.3 %
MUCOUS THREADS URNS QL MICRO: ABNORMAL /LPF
NEUTROPHILS # BLD AUTO: 3.76 X10(3)/MCL (ref 2.1–9.2)
NEUTROPHILS NFR BLD AUTO: 55.3 %
NITRITE UR QL STRIP.AUTO: NEGATIVE
NRBC BLD AUTO-RTO: 0 %
PH UR STRIP.AUTO: 7.5 [PH]
PLATELET # BLD AUTO: 208 X10(3)/MCL (ref 130–400)
PMV BLD AUTO: 11.8 FL (ref 7.4–10.4)
POTASSIUM SERPL-SCNC: 3.4 MMOL/L (ref 3.5–5.1)
PROT SERPL-MCNC: 7.8 GM/DL (ref 6.4–8.3)
PROT UR QL STRIP.AUTO: NEGATIVE
RBC # BLD AUTO: 4.42 X10(6)/MCL (ref 4.2–5.4)
RBC #/AREA URNS AUTO: ABNORMAL /HPF
RBC UR QL AUTO: NEGATIVE
SODIUM SERPL-SCNC: 144 MMOL/L (ref 136–145)
SP GR UR STRIP.AUTO: 1.01 (ref 1–1.03)
SQUAMOUS #/AREA URNS LPF: ABNORMAL /HPF
TIBC SERPL-MCNC: 221 UG/DL (ref 70–310)
TIBC SERPL-MCNC: 258 UG/DL (ref 250–450)
TRANSFERRIN SERPL-MCNC: 236 MG/DL (ref 180–382)
UROBILINOGEN UR STRIP-ACNC: NORMAL
WBC # SPEC AUTO: 6.81 X10(3)/MCL (ref 4.5–11.5)
WBC #/AREA URNS AUTO: ABNORMAL /HPF

## 2023-10-16 PROCEDURE — 36415 COLL VENOUS BLD VENIPUNCTURE: CPT

## 2023-10-16 PROCEDURE — 85025 COMPLETE CBC W/AUTO DIFF WBC: CPT

## 2023-10-16 PROCEDURE — 81001 URINALYSIS AUTO W/SCOPE: CPT

## 2023-10-16 PROCEDURE — 80053 COMPREHEN METABOLIC PANEL: CPT

## 2023-10-16 PROCEDURE — 83550 IRON BINDING TEST: CPT

## 2023-10-16 PROCEDURE — 82306 VITAMIN D 25 HYDROXY: CPT

## 2023-10-16 PROCEDURE — 82043 UR ALBUMIN QUANTITATIVE: CPT

## 2023-10-16 PROCEDURE — 83036 HEMOGLOBIN GLYCOSYLATED A1C: CPT

## 2023-10-16 PROCEDURE — 82728 ASSAY OF FERRITIN: CPT

## 2023-10-19 DIAGNOSIS — R52 GENERALIZED PAIN: ICD-10-CM

## 2023-10-19 DIAGNOSIS — M79.7 FIBROMYALGIA: Primary | ICD-10-CM

## 2023-10-19 RX ORDER — METHOCARBAMOL 500 MG/1
500 TABLET, FILM COATED ORAL 4 TIMES DAILY PRN
Qty: 90 TABLET | Refills: 1 | OUTPATIENT
Start: 2023-10-19 | End: 2024-10-18

## 2023-10-19 RX ORDER — METHOCARBAMOL 500 MG/1
500 TABLET, FILM COATED ORAL 4 TIMES DAILY PRN
COMMUNITY
End: 2023-10-19 | Stop reason: SDUPTHER

## 2023-10-19 RX ORDER — METHOCARBAMOL 500 MG/1
500 TABLET, FILM COATED ORAL 4 TIMES DAILY PRN
Qty: 60 TABLET | Refills: 1 | Status: SHIPPED | OUTPATIENT
Start: 2023-10-19 | End: 2023-12-06

## 2023-10-24 ENCOUNTER — OFFICE VISIT (OUTPATIENT)
Dept: INTERNAL MEDICINE | Facility: CLINIC | Age: 38
End: 2023-10-24
Payer: MEDICAID

## 2023-10-24 VITALS
SYSTOLIC BLOOD PRESSURE: 108 MMHG | HEIGHT: 62 IN | RESPIRATION RATE: 18 BRPM | HEART RATE: 96 BPM | WEIGHT: 216.19 LBS | TEMPERATURE: 98 F | DIASTOLIC BLOOD PRESSURE: 76 MMHG | BODY MASS INDEX: 39.78 KG/M2 | OXYGEN SATURATION: 98 %

## 2023-10-24 DIAGNOSIS — R11.0 NAUSEA: ICD-10-CM

## 2023-10-24 DIAGNOSIS — E66.01 CLASS 2 SEVERE OBESITY WITH SERIOUS COMORBIDITY AND BODY MASS INDEX (BMI) OF 39.0 TO 39.9 IN ADULT, UNSPECIFIED OBESITY TYPE: ICD-10-CM

## 2023-10-24 DIAGNOSIS — G43.E09 CHRONIC MIGRAINE WITH AURA WITHOUT STATUS MIGRAINOSUS, NOT INTRACTABLE: ICD-10-CM

## 2023-10-24 DIAGNOSIS — F41.9 ANXIETY: ICD-10-CM

## 2023-10-24 DIAGNOSIS — R79.82 ELEVATED C-REACTIVE PROTEIN (CRP): ICD-10-CM

## 2023-10-24 DIAGNOSIS — E55.9 VITAMIN D DEFICIENCY: ICD-10-CM

## 2023-10-24 DIAGNOSIS — R73.03 PREDIABETES: ICD-10-CM

## 2023-10-24 DIAGNOSIS — E87.6 HYPOKALEMIA: ICD-10-CM

## 2023-10-24 DIAGNOSIS — K21.9 GASTROESOPHAGEAL REFLUX DISEASE WITHOUT ESOPHAGITIS: ICD-10-CM

## 2023-10-24 DIAGNOSIS — F51.01 PRIMARY INSOMNIA: ICD-10-CM

## 2023-10-24 DIAGNOSIS — K59.04 CHRONIC IDIOPATHIC CONSTIPATION: ICD-10-CM

## 2023-10-24 DIAGNOSIS — E05.90 SUBCLINICAL HYPERTHYROIDISM: ICD-10-CM

## 2023-10-24 DIAGNOSIS — I27.82 CHRONIC PULMONARY EMBOLISM, UNSPECIFIED PULMONARY EMBOLISM TYPE, UNSPECIFIED WHETHER ACUTE COR PULMONALE PRESENT: ICD-10-CM

## 2023-10-24 DIAGNOSIS — D50.8 IRON DEFICIENCY ANEMIA SECONDARY TO INADEQUATE DIETARY IRON INTAKE: Primary | ICD-10-CM

## 2023-10-24 PROBLEM — G56.00 CARPAL TUNNEL SYNDROME: Status: RESOLVED | Noted: 2022-08-08 | Resolved: 2023-10-24

## 2023-10-24 PROCEDURE — 99215 OFFICE O/P EST HI 40 MIN: CPT | Mod: PBBFAC

## 2023-10-24 PROCEDURE — 99214 OFFICE O/P EST MOD 30 MIN: CPT | Mod: S$PBB,,,

## 2023-10-24 PROCEDURE — 1159F PR MEDICATION LIST DOCUMENTED IN MEDICAL RECORD: ICD-10-PCS | Mod: CPTII,,,

## 2023-10-24 PROCEDURE — 3074F SYST BP LT 130 MM HG: CPT | Mod: CPTII,,,

## 2023-10-24 PROCEDURE — 3078F DIAST BP <80 MM HG: CPT | Mod: CPTII,,,

## 2023-10-24 PROCEDURE — 1159F MED LIST DOCD IN RCRD: CPT | Mod: CPTII,,,

## 2023-10-24 PROCEDURE — 3008F PR BODY MASS INDEX (BMI) DOCUMENTED: ICD-10-PCS | Mod: CPTII,,,

## 2023-10-24 PROCEDURE — 1160F RVW MEDS BY RX/DR IN RCRD: CPT | Mod: CPTII,,,

## 2023-10-24 PROCEDURE — 3074F PR MOST RECENT SYSTOLIC BLOOD PRESSURE < 130 MM HG: ICD-10-PCS | Mod: CPTII,,,

## 2023-10-24 PROCEDURE — 1160F PR REVIEW ALL MEDS BY PRESCRIBER/CLIN PHARMACIST DOCUMENTED: ICD-10-PCS | Mod: CPTII,,,

## 2023-10-24 PROCEDURE — 3008F BODY MASS INDEX DOCD: CPT | Mod: CPTII,,,

## 2023-10-24 PROCEDURE — 3044F HG A1C LEVEL LT 7.0%: CPT | Mod: CPTII,,,

## 2023-10-24 PROCEDURE — 99214 PR OFFICE/OUTPT VISIT, EST, LEVL IV, 30-39 MIN: ICD-10-PCS | Mod: S$PBB,,,

## 2023-10-24 PROCEDURE — 3044F PR MOST RECENT HEMOGLOBIN A1C LEVEL <7.0%: ICD-10-PCS | Mod: CPTII,,,

## 2023-10-24 PROCEDURE — 3078F PR MOST RECENT DIASTOLIC BLOOD PRESSURE < 80 MM HG: ICD-10-PCS | Mod: CPTII,,,

## 2023-10-24 RX ORDER — PANTOPRAZOLE SODIUM 40 MG/1
40 TABLET, DELAYED RELEASE ORAL DAILY
Qty: 90 TABLET | Refills: 1 | Status: SHIPPED | OUTPATIENT
Start: 2023-10-24 | End: 2024-10-23

## 2023-10-24 RX ORDER — FERROUS SULFATE 325(65) MG
325 TABLET ORAL DAILY
Qty: 90 TABLET | Refills: 1 | Status: SHIPPED | OUTPATIENT
Start: 2023-10-24 | End: 2024-10-23

## 2023-10-24 RX ORDER — POTASSIUM CHLORIDE 750 MG/1
10 TABLET, EXTENDED RELEASE ORAL 2 TIMES DAILY
Qty: 180 TABLET | Refills: 1 | Status: SHIPPED | OUTPATIENT
Start: 2023-10-24 | End: 2024-10-23

## 2023-10-24 RX ORDER — TOPIRAMATE 100 MG/1
100 TABLET, FILM COATED ORAL 2 TIMES DAILY
COMMUNITY
Start: 2023-10-03 | End: 2023-10-24

## 2023-10-24 RX ORDER — BUSPIRONE HYDROCHLORIDE 5 MG/1
5 TABLET ORAL 2 TIMES DAILY
Qty: 60 TABLET | Refills: 11 | Status: SHIPPED | OUTPATIENT
Start: 2023-10-24 | End: 2024-10-23

## 2023-10-24 RX ORDER — MIRTAZAPINE 7.5 MG/1
7.5 TABLET, FILM COATED ORAL NIGHTLY
Qty: 90 TABLET | Refills: 1 | Status: SHIPPED | OUTPATIENT
Start: 2023-10-24 | End: 2024-10-23

## 2023-10-24 RX ORDER — TOPIRAMATE 25 MG/1
25 TABLET ORAL 2 TIMES DAILY
Qty: 180 TABLET | Refills: 1 | Status: SHIPPED | OUTPATIENT
Start: 2023-10-24 | End: 2024-10-23

## 2023-10-24 RX ORDER — ASPIRIN 325 MG
50000 TABLET, DELAYED RELEASE (ENTERIC COATED) ORAL
Qty: 12 CAPSULE | Refills: 0 | Status: SHIPPED | OUTPATIENT
Start: 2023-10-24 | End: 2024-10-23

## 2023-10-24 RX ORDER — HYDROXYZINE HYDROCHLORIDE 25 MG/1
25 TABLET, FILM COATED ORAL 3 TIMES DAILY PRN
Qty: 60 TABLET | Refills: 1 | Status: SHIPPED | OUTPATIENT
Start: 2023-10-24 | End: 2024-01-11 | Stop reason: SDUPTHER

## 2023-10-24 RX ORDER — FUROSEMIDE 20 MG/1
20 TABLET ORAL DAILY
Qty: 90 TABLET | Refills: 1 | Status: SHIPPED | OUTPATIENT
Start: 2023-10-24 | End: 2024-10-23

## 2023-10-24 RX ORDER — METOCLOPRAMIDE 5 MG/1
5 TABLET ORAL 4 TIMES DAILY
Qty: 180 TABLET | Refills: 1 | Status: SHIPPED | OUTPATIENT
Start: 2023-10-24 | End: 2024-01-18 | Stop reason: SDUPTHER

## 2023-10-24 NOTE — ASSESSMENT & PLAN NOTE
Continue apixaban - refilled today.  Referral placed to [facility] clinic to eval and treat.   ED precautions.

## 2023-10-24 NOTE — ASSESSMENT & PLAN NOTE
Body mass index is 39.54 kg/m².  Goal BMI <30.  Aerobic exercise 150 minutes per week.  Avoid soda, simple sugars, sweets, excessive rice, pasta, potatoes or bread.   Choose brown options when available and portion control.  Limit fast foods and fried foods.   Choose complex carbs in moderation (ex: green, leafy vegetables, beans, oatmeal).  Eat plenty of fresh fruits and vegetables with lean meats daily.   Consider permanent healthy lifestyle changes.

## 2023-10-24 NOTE — ASSESSMENT & PLAN NOTE
Lab Results   Component Value Date    [id number] 16.2 (L) 10/16/2023     Educated on increasing foods high in Vitamin D such as fish oil, cod liver oil, salmon, milk fortified with vitamin D.  RX Vitamin D3 90329 IU twice weekly x 12 weeks.  Complete entire 12 weeks of Vitamin D prescription.  After completion of prescription (12 weeks/3 months), begin taking Vitamin D 2000 I.U. tablets daily (purchase over the counter).  Repeat Vitamin D level as ordered.

## 2023-10-24 NOTE — PATIENT INSTRUCTIONS
REMINDER: Please complete labs within 1 week of appointment.   Please complete satisfaction survey when received. Thank you.    Francisco J White,     If you are due for any health screening(s) below please notify me so we can arrange them to be ordered and scheduled. Most healthy patients at your age complete them, but you are free to accept or refuse.     If you can't do it, I'll definitely understand. If you can, I'd certainly appreciate it!    All of your core healthy metrics are met.

## 2023-10-24 NOTE — PROGRESS NOTES
PATIENT NAME: Cindy Garcia  : 1985  DATE: 10/24/23  MRN: [id number]          Reason for Visit/Chief Complaint   Follow-up, Fatigue, and Fibromyalgia       History of Present Illness (HPI)     Cindy Garcia is a 37 y.o. Black female presenting in clinic today for Follow-up, Fatigue, and Fibromyalgia. PMH PE, optic neuritis, peripheral neuropathy, HERB, Meniere's, TMJ, cluster headaches, GERD, HTN, diverticulosis, DDD w/ herniated disc, Grave's disease, sickle cell trait, TIA, and fibromyalgia. She is followed by [facility] neuro clinic for headaches, Dr. Ramsay (ENT) in [place], Dr. Sharon Casiano (GYN), Dr. Chinmay Benitez (GI) and Dr. Vignesh Jaquez, and [facility] in [place] for multiple PE's, palpitations, and SOB. She was previously followed by Dr. Andersen (rheumatology) and a rheumatology clinic in [place], and Dr. Marciano Ramos (endocrinology). She was seen by Dr. Garcia (rheumatology) on 10/18/2022, his recommendation was for patient to perform low intensity aerobic exercises. No f/u indicated.Today, patient is requesting to repeat DAVE panel, autoimmune panel. Also requesting referral to [facility] cardiology clinic as she is not pleased with current cardiologist.      All pertinent labs dated 10/16/2023 reviewed and discussed with patient.      Denies smoking, drinking, or illicit drug use. Denies chest pain, shortness of breath, cough, headache, dizziness, weakness, abdominal pain, nausea, vomiting, diarrhea, constipation, dysuria, depression, anxiety, SI, and HI.     Cervical Cancer Screening - Hysterectomy.   Breast Cancer Screening - Deferred due to age.  Colon Cancer Screening - Deferred due to age.  Vaccinations: Declines vaccines.   Eye Exam - Several years. List of local eye doctors provided to patient.  Dental Exam - Several years. List of local dentists provided to patient.    Review of Systems     Review of Systems   Constitutional:  Negative for activity change and unexpected weight  change.   HENT:  Negative for hearing loss, rhinorrhea and trouble swallowing.    Eyes:  Negative for discharge and visual disturbance.   Respiratory:  Negative for chest tightness and wheezing.    Cardiovascular:  Negative for chest pain and palpitations.   Gastrointestinal:  Negative for blood in stool, constipation, diarrhea and vomiting.   Endocrine: Negative for polydipsia and polyuria.   Genitourinary:  Negative for difficulty urinating, dysuria, hematuria and menstrual problem.   Musculoskeletal:  Positive for arthralgias. Negative for joint swelling and neck pain.   Neurological:  Positive for weakness and headaches.   Psychiatric/Behavioral:  Negative for confusion and dysphoric mood.        Medical / Social / Family History     Past Medical History:   Diagnosis Date    Carpal tunnel syndrome     Cluster headaches     Cubital tunnel syndrome     Diabetes mellitus     Digestive disorder     Diverticulosis     Dysphagia     Fibromyalgia     Fluid retention     GERD (gastroesophageal reflux disease)     Graves disease     Graves' disease     Hypertension     Meniere disease     Neuropathy     PE (pulmonary thromboembolism)     Sciatic nerve pain     Sleep apnea     TMJ (dislocation of temporomandibular joint)          Past Surgical History:   Procedure Laterality Date    ADENOIDECTOMY N/A     CARPAL TUNNEL RELEASE Left 05/19/2022    Procedure: RELEASE, CARPAL TUNNEL;  Surgeon: Eben Bolanos MD;  Location: [facility];  Service: Orthopedics;  Laterality: Left;    CARPAL TUNNEL RELEASE Right 06/08/2023    Procedure: RELEASE, CARPAL TUNNEL AND CUBITAL TUNNEL RELEASE;  Surgeon: Shola Hunter MD;  Location: [facility] OR;  Service: Orthopedics;  Laterality: Right;    CHOLECYSTECTOMY N/A     HERNIA REPAIR      HYSTERECTOMY      laryngoscopy and other tracheoscopy N/A 12/23/2014    TONSILLECTOMY      ULNAR TUNNEL RELEASE Left 05/19/2022    Procedure: RELEASE, CUBITAL TUNNEL;  Surgeon: Eben Bolanos MD;  Location: [facility]  OR;  Service: Orthopedics;  Laterality: Left;    ULNAR TUNNEL RELEASE Right 06/08/2023    Procedure: RELEASE, CUBITAL TUNNEL;  Surgeon: Shola Hunter MD;  Location: [facility] OR;  Service: Orthopedics;  Laterality: Right;         Social History  Cindy Glynn  reports that she has never smoked. She has never used smokeless tobacco. She reports that she does not currently use alcohol. She reports that she does not use drugs.    Family History  Cindy Garcia's family history includes Arthritis in her mother; Asthma in her daughter; COPD in her mother; Cancer in her paternal aunt; Diabetes in her mother; Hypertension in her mother; Kidney disease in her mother; Stroke in her paternal aunt; Vision loss in her mother.    Medications and Allergies     Medications  Current Outpatient Medications   Medication Instructions    albuterol (PROVENTIL/VENTOLIN HFA) 90 mcg/actuation inhaler 2 puffs, Inhale, every 6 hr, PRN as needed for wheezing, # 8.5 g, 0 Refill(s), Start Date: 09/01/22 1:44:00 CDT    apixaban (ELIQUIS) 5 mg, Oral, Daily    busPIRone (BUSPAR) 5 mg, Oral, 2 times daily    cetirizine (ZYRTEC) 10 MG tablet 11 tablets, Oral, Daily    cholecalciferol (vitamin D3) 50,000 Units, Oral, Every 7 days    diltiaZEM (CARDIZEM CD) 120 mg, Oral, Daily    EMGALITY  mg, Subcutaneous, Every 28 days    ferrous sulfate (FEROSUL) 325 mg, Oral, Daily    furosemide (LASIX) 20 mg, Oral, Daily    gabapentin (NEURONTIN) 800 MG tablet ONE TABLET (BY MOUTH) THREE TIMES A DAY    hydrOXYzine HCL (ATARAX) 25 mg, Oral, 3 times daily PRN    lactulose (CHRONULAC) 20 g, Oral, 3 times daily PRN    lasmiditan (REYVOW) tablet 100 mg TAKE 2 TABLETS (BY MOUTH) EVERY DAY AS NEEDED FOR SEVERE HEADACHES ONLY. ALLOW AT LEAST 24 HOURS BETWEEN DOSES.    linaCLOtide (LINZESS) 72 mcg, Oral, Before breakfast    metFORMIN (GLUCOPHAGE-XR) 500 MG ER 24hr tablet TAKE 2 TABLETS (BY MOUTH) EVERY MORNING    methocarbamoL (ROBAXIN) 500 mg, Oral,  "4 times daily PRN    metoclopramide HCl (REGLAN) 5 mg, Oral, 4 times daily    mirtazapine (REMERON) 7.5 mg, Oral, Nightly    ondansetron (ZOFRAN) 8 mg, Oral, Every 8 hours PRN    pantoprazole (PROTONIX) 40 mg, Oral, Daily    potassium chloride (KLOR-CON) 10 MEQ TbSR 10 mEq, Oral, 2 times daily    topiramate (TOPAMAX) 25 mg, Oral, 2 times daily    UBRELVY 100 mg, Oral, Daily PRN       Allergies  Review of patient's allergies indicates:  No Known Allergies    Physical Examination   Visit Vitals  /76   Pulse 96   Temp 98.2 °F (36.8 °C)   Resp 18   Ht 5' 2" (1.575 m)   Wt 98.1 kg (216 lb 3.2 oz)   LMP  (LMP Unknown)   SpO2 98%   BMI 39.54 kg/m²     Physical Exam  Constitutional:       Appearance: Normal appearance. She is normal weight.   HENT:      Head: Normocephalic and atraumatic.      Right Ear: External ear normal.      Left Ear: External ear normal.      Nose: Nose normal.      Mouth/Throat:      Mouth: Mucous membranes are moist.      Pharynx: Oropharynx is clear.   Eyes:      Extraocular Movements: Extraocular movements intact.      Conjunctiva/sclera: Conjunctivae normal.      Pupils: Pupils are equal, round, and reactive to light.   Cardiovascular:      Rate and Rhythm: Normal rate and regular rhythm.      Pulses: Normal pulses.      Heart sounds: Normal heart sounds.   Pulmonary:      Effort: Pulmonary effort is normal.      Breath sounds: Normal breath sounds.   Abdominal:      General: Bowel sounds are normal.      Palpations: Abdomen is soft.   Musculoskeletal:         General: Normal range of motion.      Cervical back: Normal range of motion and neck supple.   Skin:     General: Skin is warm and dry.      Capillary Refill: Capillary refill takes less than 2 seconds.   Neurological:      General: No focal deficit present.      Mental Status: She is alert and oriented to person, place, and time.   Psychiatric:         Mood and Affect: Mood normal.         Behavior: Behavior normal.         Thought " "Content: Thought content normal.         Judgment: Judgment normal.           Results     Lab Results   Component Value Date    WBC 6.81 10/16/2023    RBC 4.42 10/16/2023    HGB 12.8 10/16/2023    HCT 39.5 10/16/2023    MCV 89.4 10/16/2023    MCH 29.0 10/16/2023    MCHC 32.4 (L) 10/16/2023    RDW 13.2 10/16/2023     10/16/2023    MPV 11.8 (H) 10/16/2023      Lab Results   Component Value Date     10/16/2023    K 3.4 (L) 10/16/2023    CHLORIDE 112 (H) 10/16/2023    CO2 24 10/16/2023    GLUCOSE 115 (H) 10/16/2023    BUN 6.7 (L) 10/16/2023    CREATININE 0.99 10/16/2023    LABPROT 7.8 10/16/2023    ALBUMIN 4.0 10/16/2023    BILITOT 0.7 10/16/2023    ALKPHOS 112 10/16/2023    AST 14 10/16/2023    ALT 19 10/16/2023    AGAP 7.0 05/18/2022    EGFRNORACEVR >60 10/16/2023     Lab Results   Component Value Date    TSH 0.5218 12/12/2022     Lab Results   Component Value Date    CHOL 177 03/27/2023    HDL 43 03/27/2023    .00 03/27/2023    TRIG 103 03/27/2023     Lab Results   Component Value Date    COLORUA Colorless (A) 10/16/2023    SGUA 1.010 10/16/2023    PROTEINUA Negative 10/16/2023    GLUCOSEUA Normal 10/16/2023    BILIRUBINUA Negative 10/16/2023    BLOODUA Negative 10/16/2023    WBCUA 0-5 10/16/2023    RBCUA 0-5 10/16/2023    BACTERIA Trace (A) 10/16/2023    NITRITESUA Negative 10/16/2023    LEUKOCYTESUR Negative 10/16/2023    UROBILINOGEN Normal 10/16/2023     Lab Results   Component Value Date    CREATRANDUR 79.3 10/16/2023    MICALBCREAT  10/16/2023      Comment:      Unable to calculate     Lab Results   Component Value Date    [id number] 16.2 (L) 10/16/2023    FOLATE 7.3 08/08/2022     Lab Results   Component Value Date    HIV Nonreactive 08/08/2022    HEPAIGM Nonreactive 08/08/2022    HEPBCOREM Nonreactive 08/08/2022    HEPCAB Nonreactive 08/08/2022     No results found for: "FITDIAG", "COLOGUARD"  No results found for: "OCCBLDIA"    Assessment and Plan (including Health Maintenance) "     Problem List Items Addressed This Visit          Neuro    Chronic migraine with aura    Current Assessment & Plan     Managed by [facility] neurology clinic.  Topiramate refilled.         Relevant Medications    topiramate (TOPAMAX) 25 MG tablet       Psychiatric    Anxiety    Current Assessment & Plan     Continue buspirone and hydroxyzine - refilled today.  Take meds as prescribed.  Practice deep breathing or abdominal breathing exercises when anxiety occurs.  Exercise daily. Get sunlight daily.  Avoid caffeine, alcohol and stimulants.  Do not use illicit drugs.  Practice positive phrases and repeat throughout the day, yoga, lavender scents or Chamomile tea will help anxiety.  Set healthy boundaries, avoid people and conversations that increase stress.  Reports any symptoms of suicidal or homicidal ideations immediately, go to nearest emergency room.           Relevant Medications    hydrOXYzine HCL (ATARAX) 25 MG tablet    busPIRone (BUSPAR) 5 MG Tab       Renal/    Hypokalemia    Current Assessment & Plan     Lab Results   Component Value Date    K 3.4 (L) 10/16/2023   Take potassium chloride as prescribed - refilled today.   Incorporated potassium rich foods into you diet such as nuts, seeds, peas, beans (dried), whole grains, fresh fruits and vegetables, lean red meat and yogurt.   Education provided on additional sources of potassium-rich foods including: carrots, broccoli, potatoes, celery, spinach, squash, tomatoes, avocados, apricots, cantaloupe, bananas, nectarines, prunes, figs and raisins.   Seek health care assistance if you experience chest pain, shortness of breath, vomiting or diarrhea lasting more than 2 days, fainting, palpitations, or weakness worsens.           Relevant Medications    potassium chloride (KLOR-CON) 10 MEQ TbSR    Other Relevant Orders    Comprehensive Metabolic Panel       Hematology    Chronic pulmonary embolism    Current Assessment & Plan     Continue apixaban - refilled  today.  Referral placed to [facility] Cardiology clinic to eval and treat.   ED precautions.         Relevant Medications    apixaban (ELIQUIS) 5 mg Tab    Other Relevant Orders    Ambulatory referral/consult to Cardiology       Oncology    Iron deficiency anemia secondary to inadequate dietary iron intake - Primary    Current Assessment & Plan     Lab Results   Component Value Date    HCT 39.5 10/16/2023    HGB 12.8 10/16/2023    FERRITIN 136.89 10/16/2023    TIBC 258 10/16/2023    LABIRON 14 (L) 10/16/2023   Continue ferrous sulfate - refilled today.  Denies blood in stool.  Iron, TIBC, ferritin, LDH, retic count ordered.  Take iron supplements as prescribed.  Add Vitamin C to your diet.  Take iron and vitamin C on an empty stomach for better absorption if tolerated.  Add iron rich foods to diet such as liver, lean beef, eggs, dried fruit, dark green leafy vegetables.  Education provided on additional sources of potassium-rich foods.  Do NOT drink milk or take antacids at the same time you take your iron supplement.  Iron supplements can cause constipation; add stool softener or fiber as needed.           Relevant Medications    ferrous sulfate (FEROSUL) 325 mg (65 mg iron) Tab tablet    Other Relevant Orders    Iron and TIBC    Ferritin    CBC Auto Differential    Lactate Dehydrogenase    Haptoglobin    Reticulocytes    Path Review, Peripheral Smear       Endocrine    Subclinical hyperthyroidism    Relevant Orders    TSH    T4, Free    Vitamin D deficiency    Current Assessment & Plan     Lab Results   Component Value Date    [id number] 16.2 (L) 10/16/2023   Educated on increasing foods high in Vitamin D such as fish oil, cod liver oil, salmon, milk fortified with vitamin D.  RX Vitamin D3 17140 IU twice weekly x 12 weeks.  Complete entire 12 weeks of Vitamin D prescription.  After completion of prescription (12 weeks/3 months), begin taking Vitamin D 2000 I.U. tablets daily (purchase over the counter).  Repeat  Vitamin D level as ordered.           Relevant Medications    cholecalciferol, vitamin D3, 1,250 mcg (50,000 unit) capsule    Other Relevant Orders    Vitamin D    Class 2 severe obesity with serious comorbidity and body mass index (BMI) of 39.0 to 39.9 in adult    Current Assessment & Plan     Body mass index is 39.54 kg/m².  Goal BMI <30.  Aerobic exercise 150 minutes per week.  Avoid soda, simple sugars, sweets, excessive rice, pasta, potatoes or bread.   Choose brown options when available and portion control.  Limit fast foods and fried foods.   Choose complex carbs in moderation (ex: green, leafy vegetables, beans, oatmeal).  Eat plenty of fresh fruits and vegetables with lean meats daily.   Consider permanent healthy lifestyle changes.          Relevant Orders    Microalbumin/Creatinine Ratio, Urine    Lipid Panel       GI    Gastroesophageal reflux disease without esophagitis    Current Assessment & Plan     Continue pantoprazole - refilled today.  Avoid spicy, acidic, fried food and alcohol.    Eat 2-3 hours before bed.   Avoid tight fitting clothes and chew food thoroughly.    Reduce caffeine intake, avoid soda.    Take meds as prescribed.             Relevant Medications    pantoprazole (PROTONIX) 40 MG tablet    Chronic idiopathic constipation    Current Assessment & Plan     Continue linzess as prescribed - refilled today.  Educated on high fiber diet and exercise.  Drink at least 64 ozs of water daily.  Eat hot breakfast every morning.          Relevant Medications    linaCLOtide (LINZESS) 72 mcg Cap capsule       Other    Elevated C-reactive protein (CRP)    Current Assessment & Plan     Repeat auto immune labs today - will refer to [facility] rheumatology clinic once complete.         Relevant Orders    Sedimentation rate    C-Reactive Protein    CYCLIC CITRULLINATED PEPTIDE (CCP) ANTIBODY    Antinuclear Antibody (DAVE), HEp-2, IgG    DAVE by IFA, w/Rflx    SM and SM/RNP Antibodies     Other Visit Diagnoses        Primary insomnia        Relevant Medications    mirtazapine (REMERON) 7.5 MG Tab    Nausea        Relevant Medications    metoclopramide HCl (REGLAN) 5 MG tablet    Prediabetes        Relevant Orders    Urinalysis, Reflex to Urine Culture    Hemoglobin A1C             Health Maintenance Due   Topic Date Due    COVID-19 Vaccine (1) Never done     All of your core healthy metrics are met.      Health Maintenance Topics with due status: Not Due       Topic Last Completion Date    Hemoglobin A1c (Prediabetes) 10/16/2023       Future Appointments   Date Time Provider Department Center   11/13/2023  9:30 AM Allison Butts, ANP [facility] NEURO [place]   11/15/2023  1:50 PM RESIDENT 2, [facility] ORTHOPEDICS [facility] ORTHO [place] [facility]   4/24/2024 12:15 PM Palmira Pinzon FNP [facility] INTMED [facility]        Follow up in about 6 months (around 4/24/2024) for F2F, Follow up, Med check, Lab review, RTC PRN, Wellness.          Signature:        JAIMIE Sutton  [facility]  [address] [facility] [address]    Date of encounter: 10/24/23

## 2023-10-24 NOTE — ASSESSMENT & PLAN NOTE
Lab Results   Component Value Date    HCT 39.5 10/16/2023    HGB 12.8 10/16/2023    FERRITIN 136.89 10/16/2023    TIBC 258 10/16/2023    LABIRON 14 (L) 10/16/2023   Continue ferrous sulfate - refilled today.  Denies blood in stool.  Iron, TIBC, ferritin, LDH, retic count ordered.  Take iron supplements as prescribed.  Add Vitamin C to your diet.  Take iron and vitamin C on an empty stomach for better absorption if tolerated.  Add iron rich foods to diet such as liver, lean beef, eggs, dried fruit, dark green leafy vegetables.  Education provided on additional sources of potassium-rich foods.  Do NOT drink milk or take antacids at the same time you take your iron supplement.  Iron supplements can cause constipation; add stool softener or fiber as needed.

## 2023-10-24 NOTE — ASSESSMENT & PLAN NOTE
Continue linzess as prescribed - refilled today.  Educated on high fiber diet and exercise.  Drink at least 64 ozs of water daily.  Eat hot breakfast every morning.

## 2023-10-24 NOTE — ASSESSMENT & PLAN NOTE
Lab Results   Component Value Date    K 3.4 (L) 10/16/2023     Take potassium chloride as prescribed - refilled today.   Incorporated potassium rich foods into you diet such as nuts, seeds, peas, beans (dried), whole grains, fresh fruits and vegetables, lean red meat and yogurt.   Education provided on additional sources of potassium-rich foods including: carrots, broccoli, potatoes, celery, spinach, squash, tomatoes, avocados, apricots, cantaloupe, bananas, nectarines, prunes, figs and raisins.   Seek health care assistance if you experience chest pain, shortness of breath, vomiting or diarrhea lasting more than 2 days, fainting, palpitations, or weakness worsens.

## 2023-10-24 NOTE — ASSESSMENT & PLAN NOTE
Continue buspirone and hydroxyzine - refilled today.  Take meds as prescribed.  Practice deep breathing or abdominal breathing exercises when anxiety occurs.  Exercise daily. Get sunlight daily.  Avoid caffeine, alcohol and stimulants.  Do not use illicit drugs.  Practice positive phrases and repeat throughout the day, yoga, lavender scents or Chamomile tea will help anxiety.  Set healthy boundaries, avoid people and conversations that increase stress.  Reports any symptoms of suicidal or homicidal ideations immediately, go to nearest emergency room.

## 2023-10-24 NOTE — ASSESSMENT & PLAN NOTE
Repeat auto immune labs today - will refer to [facility] rheumatology clinic once complete.

## 2023-11-16 RX ORDER — METFORMIN HYDROCHLORIDE 500 MG/1
TABLET, EXTENDED RELEASE ORAL
Qty: 90 TABLET | Refills: 1 | Status: SHIPPED | OUTPATIENT
Start: 2023-11-16 | End: 2024-02-08 | Stop reason: SDUPTHER

## 2023-12-05 ENCOUNTER — PATIENT MESSAGE (OUTPATIENT)
Dept: INTERNAL MEDICINE | Facility: CLINIC | Age: 38
End: 2023-12-05
Payer: MEDICAID

## 2023-12-05 DIAGNOSIS — R52 GENERALIZED PAIN: ICD-10-CM

## 2023-12-05 DIAGNOSIS — R05.8 NON-PRODUCTIVE COUGH: Primary | ICD-10-CM

## 2023-12-06 DIAGNOSIS — M79.7 FIBROMYALGIA: ICD-10-CM

## 2023-12-06 RX ORDER — BENZONATATE 100 MG/1
100 CAPSULE ORAL 3 TIMES DAILY PRN
Qty: 30 CAPSULE | Refills: 0 | Status: SHIPPED | OUTPATIENT
Start: 2023-12-06 | End: 2023-12-16

## 2023-12-06 RX ORDER — CYCLOBENZAPRINE HCL 5 MG
5-10 TABLET ORAL 3 TIMES DAILY PRN
Qty: 60 TABLET | Refills: 1 | Status: SHIPPED | OUTPATIENT
Start: 2023-12-06 | End: 2024-12-05

## 2023-12-06 RX ORDER — NABUMETONE 500 MG/1
500 TABLET, FILM COATED ORAL NIGHTLY
Qty: 90 TABLET | Refills: 1 | Status: SHIPPED | OUTPATIENT
Start: 2023-12-06 | End: 2024-12-05

## 2023-12-07 RX ORDER — METHOCARBAMOL 500 MG/1
500 TABLET, FILM COATED ORAL 4 TIMES DAILY PRN
Qty: 60 TABLET | Refills: 1 | OUTPATIENT
Start: 2023-12-07 | End: 2024-12-06

## 2024-01-11 DIAGNOSIS — F41.9 ANXIETY: ICD-10-CM

## 2024-01-11 RX ORDER — HYDROXYZINE HYDROCHLORIDE 25 MG/1
25 TABLET, FILM COATED ORAL 3 TIMES DAILY PRN
Qty: 60 TABLET | Refills: 1 | Status: SHIPPED | OUTPATIENT
Start: 2024-01-11 | End: 2025-01-10

## 2024-01-18 ENCOUNTER — TELEPHONE (OUTPATIENT)
Dept: ORTHOPEDICS | Facility: CLINIC | Age: 39
End: 2024-01-18
Payer: MEDICAID

## 2024-01-18 DIAGNOSIS — R11.0 NAUSEA: ICD-10-CM

## 2024-01-18 RX ORDER — METOCLOPRAMIDE 5 MG/1
5 TABLET ORAL 4 TIMES DAILY
Qty: 180 TABLET | Refills: 1 | Status: SHIPPED | OUTPATIENT
Start: 2024-01-18 | End: 2025-01-17

## 2024-01-18 NOTE — TELEPHONE ENCOUNTER
Patient canceled and no showed multiple recent appointments.I called the patient to follow-up on her hand surgery and schedule another follow-up appointment with Dr. Hunter.     Patient stated that she is doing well . Patient agreed to schedule another follow-up appointment.   I explained that a  will call her soon to schedule but if she does not hear from someone she should call [phone]. Patient voiced a clear understanding.

## 2024-01-18 NOTE — TELEPHONE ENCOUNTER
I called the patient and scheduled her appointment. I gave her location and appointment information.

## 2024-02-08 NOTE — TELEPHONE ENCOUNTER
Pharmacy requesting refill for metFORMIN (GLUCOPHAGE-XR) 500 MG ER 24hr tablet       LOV:10/24/23      NOV: 4/24/24

## 2024-02-09 RX ORDER — METFORMIN HYDROCHLORIDE 500 MG/1
1000 TABLET, EXTENDED RELEASE ORAL
Qty: 180 TABLET | Refills: 2 | Status: SHIPPED | OUTPATIENT
Start: 2024-02-09 | End: 2025-02-08

## 2024-03-25 NOTE — TELEPHONE ENCOUNTER
Patient canceled/ no show follow-up appointments with [facility]/Dr. Hunter on 7/26/23 (canceled), 7/31/23 (canceled), 9/13/23 (canceled), 10/16/23 (canceled), 11/15/23 (canceled), 12/13/23 (no show), 2/19/24 (canceled), 3/6/24 (canceled)     I called the patient to follow-up on her surgery and schedule another follow-up appointment with Dr. Hunter.     Patient did not answer - LVM.

## 2024-04-15 ENCOUNTER — PATIENT MESSAGE (OUTPATIENT)
Dept: INTERNAL MEDICINE | Facility: CLINIC | Age: 39
End: 2024-04-15
Payer: MEDICAID

## 2024-04-15 ENCOUNTER — LAB VISIT (OUTPATIENT)
Dept: LAB | Facility: HOSPITAL | Age: 39
End: 2024-04-15
Payer: MEDICAID

## 2024-04-15 DIAGNOSIS — R73.03 PREDIABETES: ICD-10-CM

## 2024-04-15 DIAGNOSIS — E66.01 CLASS 2 SEVERE OBESITY WITH SERIOUS COMORBIDITY AND BODY MASS INDEX (BMI) OF 39.0 TO 39.9 IN ADULT, UNSPECIFIED OBESITY TYPE: ICD-10-CM

## 2024-04-15 DIAGNOSIS — D50.8 IRON DEFICIENCY ANEMIA SECONDARY TO INADEQUATE DIETARY IRON INTAKE: ICD-10-CM

## 2024-04-15 DIAGNOSIS — E55.9 VITAMIN D DEFICIENCY: ICD-10-CM

## 2024-04-15 DIAGNOSIS — E05.90 SUBCLINICAL HYPERTHYROIDISM: ICD-10-CM

## 2024-04-15 DIAGNOSIS — E87.6 HYPOKALEMIA: ICD-10-CM

## 2024-04-15 LAB
ALBUMIN SERPL-MCNC: 3.9 G/DL (ref 3.5–5)
ALBUMIN/GLOB SERPL: 0.9 RATIO (ref 1.1–2)
ALP SERPL-CCNC: 96 UNIT/L (ref 40–150)
ALT SERPL-CCNC: 16 UNIT/L (ref 0–55)
APPEARANCE UR: ABNORMAL
AST SERPL-CCNC: 14 UNIT/L (ref 5–34)
BACTERIA #/AREA URNS AUTO: ABNORMAL /HPF
BASOPHILS # BLD AUTO: 0.04 X10(3)/MCL
BASOPHILS NFR BLD AUTO: 1 %
BILIRUB SERPL-MCNC: 0.7 MG/DL
BILIRUB UR QL STRIP.AUTO: NEGATIVE
BUN SERPL-MCNC: 7.3 MG/DL (ref 7–18.7)
CALCIUM SERPL-MCNC: 9.8 MG/DL (ref 8.4–10.2)
CHLORIDE SERPL-SCNC: 111 MMOL/L (ref 98–107)
CHOLEST SERPL-MCNC: 169 MG/DL
CHOLEST/HDLC SERPL: 4 {RATIO} (ref 0–5)
CO2 SERPL-SCNC: 20 MMOL/L (ref 22–29)
COLOR UR AUTO: ABNORMAL
CREAT SERPL-MCNC: 1.18 MG/DL (ref 0.55–1.02)
CREAT UR-MCNC: 143.2 MG/DL (ref 45–106)
DEPRECATED CALCIDIOL+CALCIFEROL SERPL-MC: 17.8 NG/ML (ref 30–80)
EOSINOPHIL # BLD AUTO: 0.04 X10(3)/MCL (ref 0–0.9)
EOSINOPHIL NFR BLD AUTO: 1 %
ERYTHROCYTE [DISTWIDTH] IN BLOOD BY AUTOMATED COUNT: 13 % (ref 11.5–17)
EST. AVERAGE GLUCOSE BLD GHB EST-MCNC: 119.8 MG/DL
FERRITIN SERPL-MCNC: 149.6 NG/ML (ref 4.63–204)
GFR SERPLBLD CREATININE-BSD FMLA CKD-EPI: 60 MLS/MIN/1.73/M2
GLOBULIN SER-MCNC: 4.2 GM/DL (ref 2.4–3.5)
GLUCOSE SERPL-MCNC: 149 MG/DL (ref 74–100)
GLUCOSE UR QL STRIP.AUTO: NORMAL
HAPTOGLOB SERPL-MCNC: 176 MG/DL (ref 35–250)
HBA1C MFR BLD: 5.8 %
HCT VFR BLD AUTO: 39.7 % (ref 37–47)
HDLC SERPL-MCNC: 45 MG/DL (ref 35–60)
HGB BLD-MCNC: 13.2 G/DL (ref 12–16)
HYALINE CASTS #/AREA URNS LPF: ABNORMAL /LPF
IMM GRANULOCYTES # BLD AUTO: 0.01 X10(3)/MCL (ref 0–0.04)
IMM GRANULOCYTES NFR BLD AUTO: 0.3 %
IRON SATN MFR SERPL: 12 % (ref 20–50)
IRON SERPL-MCNC: 34 UG/DL (ref 50–170)
KETONES UR QL STRIP.AUTO: NEGATIVE
LDH SERPL-CCNC: 179 U/L (ref 125–220)
LDLC SERPL CALC-MCNC: 106 MG/DL (ref 50–140)
LEUKOCYTE ESTERASE UR QL STRIP.AUTO: NEGATIVE
LYMPHOCYTES # BLD AUTO: 1.14 X10(3)/MCL (ref 0.6–4.6)
LYMPHOCYTES NFR BLD AUTO: 29.5 %
MCH RBC QN AUTO: 29.3 PG (ref 27–31)
MCHC RBC AUTO-ENTMCNC: 33.2 G/DL (ref 33–36)
MCV RBC AUTO: 88 FL (ref 80–94)
MICROALBUMIN UR-MCNC: <5 UG/ML
MICROALBUMIN/CREAT RATIO PNL UR: ABNORMAL
MONOCYTES # BLD AUTO: 0.33 X10(3)/MCL (ref 0.1–1.3)
MONOCYTES NFR BLD AUTO: 8.5 %
MUCOUS THREADS URNS QL MICRO: ABNORMAL /LPF
NEUTROPHILS # BLD AUTO: 2.3 X10(3)/MCL (ref 2.1–9.2)
NEUTROPHILS NFR BLD AUTO: 59.7 %
NITRITE UR QL STRIP.AUTO: NEGATIVE
NRBC BLD AUTO-RTO: 0 %
PH UR STRIP.AUTO: 5.5 [PH]
PLATELET # BLD AUTO: 188 X10(3)/MCL (ref 130–400)
PMV BLD AUTO: 11.8 FL (ref 7.4–10.4)
POTASSIUM SERPL-SCNC: 3.5 MMOL/L (ref 3.5–5.1)
PROT SERPL-MCNC: 8.1 GM/DL (ref 6.4–8.3)
PROT UR QL STRIP.AUTO: NEGATIVE
RBC # BLD AUTO: 4.51 X10(6)/MCL (ref 4.2–5.4)
RBC #/AREA URNS AUTO: ABNORMAL /HPF
RBC UR QL AUTO: ABNORMAL
RET# (OHS): 0.07 X10E6/UL (ref 0.02–0.08)
RETICULOCYTE COUNT AUTOMATED (OLG): 1.62 % (ref 1.1–2.1)
SODIUM SERPL-SCNC: 141 MMOL/L (ref 136–145)
SP GR UR STRIP.AUTO: 1.01 (ref 1–1.03)
SQUAMOUS #/AREA URNS LPF: ABNORMAL /HPF
T4 FREE SERPL-MCNC: 0.86 NG/DL (ref 0.7–1.48)
TIBC SERPL-MCNC: 242 UG/DL (ref 70–310)
TIBC SERPL-MCNC: 276 UG/DL (ref 250–450)
TRANSFERRIN SERPL-MCNC: 260 MG/DL (ref 180–382)
TRIGL SERPL-MCNC: 92 MG/DL (ref 37–140)
TSH SERPL-ACNC: 0.51 UIU/ML (ref 0.35–4.94)
UROBILINOGEN UR STRIP-ACNC: NORMAL
VLDLC SERPL CALC-MCNC: 18 MG/DL
WBC # SPEC AUTO: 3.86 X10(3)/MCL (ref 4.5–11.5)
WBC #/AREA URNS AUTO: ABNORMAL /HPF

## 2024-04-15 PROCEDURE — 82728 ASSAY OF FERRITIN: CPT

## 2024-04-15 PROCEDURE — 80053 COMPREHEN METABOLIC PANEL: CPT

## 2024-04-15 PROCEDURE — 83010 ASSAY OF HAPTOGLOBIN QUANT: CPT

## 2024-04-15 PROCEDURE — 82043 UR ALBUMIN QUANTITATIVE: CPT

## 2024-04-15 PROCEDURE — 84439 ASSAY OF FREE THYROXINE: CPT

## 2024-04-15 PROCEDURE — 82306 VITAMIN D 25 HYDROXY: CPT

## 2024-04-15 PROCEDURE — 36415 COLL VENOUS BLD VENIPUNCTURE: CPT

## 2024-04-15 PROCEDURE — 85025 COMPLETE CBC W/AUTO DIFF WBC: CPT

## 2024-04-15 PROCEDURE — 84443 ASSAY THYROID STIM HORMONE: CPT

## 2024-04-15 PROCEDURE — 81015 MICROSCOPIC EXAM OF URINE: CPT

## 2024-04-15 PROCEDURE — 83540 ASSAY OF IRON: CPT

## 2024-04-15 PROCEDURE — 80061 LIPID PANEL: CPT

## 2024-04-15 PROCEDURE — 85045 AUTOMATED RETICULOCYTE COUNT: CPT

## 2024-04-15 PROCEDURE — 87086 URINE CULTURE/COLONY COUNT: CPT

## 2024-04-15 PROCEDURE — 83615 LACTATE (LD) (LDH) ENZYME: CPT

## 2024-04-15 PROCEDURE — 83036 HEMOGLOBIN GLYCOSYLATED A1C: CPT

## 2024-04-16 ENCOUNTER — OFFICE VISIT (OUTPATIENT)
Dept: NEUROLOGY | Facility: CLINIC | Age: 39
End: 2024-04-16
Payer: MEDICAID

## 2024-04-16 DIAGNOSIS — G47.33 OSA ON CPAP: ICD-10-CM

## 2024-04-16 DIAGNOSIS — E66.01 CLASS 2 SEVERE OBESITY WITH SERIOUS COMORBIDITY AND BODY MASS INDEX (BMI) OF 39.0 TO 39.9 IN ADULT, UNSPECIFIED OBESITY TYPE: ICD-10-CM

## 2024-04-16 DIAGNOSIS — G43.E09 CHRONIC MIGRAINE WITH AURA WITHOUT STATUS MIGRAINOSUS, NOT INTRACTABLE: Primary | ICD-10-CM

## 2024-04-16 PROCEDURE — 99214 OFFICE O/P EST MOD 30 MIN: CPT | Mod: 95,,, | Performed by: NURSE PRACTITIONER

## 2024-04-16 PROCEDURE — 3044F HG A1C LEVEL LT 7.0%: CPT | Mod: CPTII,95,, | Performed by: NURSE PRACTITIONER

## 2024-04-16 PROCEDURE — G2211 COMPLEX E/M VISIT ADD ON: HCPCS | Mod: 95,,, | Performed by: NURSE PRACTITIONER

## 2024-04-16 PROCEDURE — 1160F RVW MEDS BY RX/DR IN RCRD: CPT | Mod: CPTII,95,, | Performed by: NURSE PRACTITIONER

## 2024-04-16 PROCEDURE — 1159F MED LIST DOCD IN RCRD: CPT | Mod: CPTII,95,, | Performed by: NURSE PRACTITIONER

## 2024-04-16 RX ORDER — TOPIRAMATE 25 MG/1
25 TABLET ORAL 2 TIMES DAILY
Qty: 180 TABLET | Refills: 2 | Status: SHIPPED | OUTPATIENT
Start: 2024-04-16 | End: 2025-04-16

## 2024-04-16 RX ORDER — TOPIRAMATE 100 MG/1
100 TABLET, FILM COATED ORAL NIGHTLY
COMMUNITY
End: 2024-04-16 | Stop reason: SDUPTHER

## 2024-04-16 RX ORDER — TOPIRAMATE 100 MG/1
100 TABLET, FILM COATED ORAL 2 TIMES DAILY
Qty: 180 TABLET | Refills: 3 | Status: SHIPPED | OUTPATIENT
Start: 2024-04-16 | End: 2025-04-16

## 2024-04-16 RX ORDER — GALCANEZUMAB 120 MG/ML
120 INJECTION, SOLUTION SUBCUTANEOUS
Qty: 1 EACH | Refills: 11 | Status: SHIPPED | OUTPATIENT
Start: 2024-04-16

## 2024-04-16 RX ORDER — UBROGEPANT 100 MG/1
100 TABLET ORAL DAILY PRN
Qty: 16 TABLET | Refills: 2 | Status: SHIPPED | OUTPATIENT
Start: 2024-04-16

## 2024-04-16 RX ORDER — MIRTAZAPINE 15 MG/1
7.5 TABLET, FILM COATED ORAL NIGHTLY
COMMUNITY
Start: 2024-03-18

## 2024-04-16 NOTE — PROGRESS NOTES
[facility] Neurology Telemedicine Follow Up Visit Note  This is a real-time audio/video visit that was performed with the originating site at patient's home and the distant site, [facility]. Verbal consent to participate in interactive audio & video visit was obtained.    I discussed with the patient regarding the nature of our telehealth visits, that:    - Our sessions are not being recorded and that personal health information is protected  - Provider would evaluate the patient and recommend diagnostics and treatments based on my assessment  - [facility] will provide follow up care in person if/when the patient needs it.     Subjective:      Patient ID: Cindy Garcia is a 38 y.o. female.    Chief Complaint: Migraine (States migraines the same; Has about 7/month)    HPI  This is a 38 year old female with PMHX of PEs, HERB, TMJ issues, HTN, GERD, Fibromyalgia, Grave's Disease, Sickle Cell Trait, TIA, who was referred for medically intractable chronic migraine with visual aura with status migrainosus not responding to Topamax or Elavil. Also complaining of insomnia. Patient was last seen on 11/13/2023. During that visit, Emgality 120mg/mL monthly, Ubrelvy 100mg PO BID PRN, Lasmitidan 200mg PO nightly and TPM 125mg PO BID were continued    Today, Pt states migraines improved on Emgality, however, has been out for 3 mths. [facility] pharmacy was to notify us of need for RF, never notified. Averaging 7 migraines/month, vary in intensity, some days better than others. Last migraine two days ago, Ubrelvy effective as abortive therapy. Sparingly requires Reyvow. Dx w/HERB, utilizes CPAP. States she is on a low sodium diet, has improved her bedtime routine. Sleeping better on Remeron. Denies photophobia/phonophobia/nausea. Started walking daily and walks often during the day at work.    Age of Onset - childhood    Semiology - left temporal,  pounding, 10/10, lasting up to 48 hrs, with nausea, photophobia, phonophobia. Associated with Left eye vision peripheral aura.     Frequency - 16 migraine headache days/month; 7 migraine/month on Emgality    Exacerbating Factors - denied    Risk Factors -  - Family history of headache disorder? sister and daughter with migraine.  - History of Head Trauma? denied  - History of CNS infection? denied  - History of underlying mood disorder? denied  - Illicit drug use? denied  - Tobacco use? denied  - History of sleep disorder? HERB on CPAP. Still staying up the whole night for this past 1 month. Having trouble falling asleep. Stays asleep for 5-6 hours when she falls asleep.     Workup -  - MRI Brain +/- Yassine 11/24/2015 - I have reviewed the study independently and with the patient. Unremarkable  - MRI Brain w/o Yassine 3/1/2018 - I have reviewed the study independently and with the patient. Unremarkable.  - MRI Face/Orbit +/- Yassine 6/5/2018 - I have reviewed the study independently and with the patient. Unremarkable  - MRI Pituitary +/- Yassine 11/13/2019 - I have reviewed the study independently and with the patient. Unremarkable  - Lumbar Puncture:  - MRA Head:  - MRV Head:    Current ppx meds -  TPM IR 100mg BID -  Emgality 120mg/mL once per month (5/7/2021 - present) - effective  Effexor 37.5mg daily (8/4/2021 - present) - started by mental health provider    Current abortive meds -  Tylenol PRN - ineffective. Takes it daily for the six months.  Lasmiditan 200mg PRN for severe headaches (5/7/2021 - present) - partially effective  Ubrelvy 100mg PO BID PRN at onset of migraine     Prior ppx meds -  Amitriptyline 50mg qhs    Prior abortive meds -   Naproxen 500mg BID PRN  Nurtec ODT 75mg once a day PRN (5/7/2021 - 2/8/2022) - ineffective    Review of Systems   Constitutional: no fever, fatigue, weakness  Eye: no vision loss, eye redness, drainage, or pain  ENMT: no sore throat, ear pain, sinus pain/congestion, nasal  congestion/drainage  Respiratory: no cough, no wheezing, no shortness of breath  Cardiovascular: no chest pain, no palpitations, no edema  Gastrointestinal: no nausea, vomiting, or diarrhea. No abdominal pain  Genitourinary: no dysuria, no urinary frequency or urgency, no hematuria  Hema/Lymph: no abnormal bruising or bleeding  Endocrine: no heat or cold intolerance, no excessive thirst or excessive urination  Musculoskeletal: no muscle or joint pain, no joint swelling  Integumentary: no skin rash or abnormal lesion  Psychiatric: no depressed mood, no anxiety, no visual/auditory hallucinations, no delusions, no suicidal or homicidal ideation  Neurologic: as per HPI    Objective:      Physical Exam    General: no acute distress  Eye: no ptosis, conjunctiva cledar  HENT: round, normocephalic, mucosa moist  Neck: ROM WNL  Respiratory: no distress on RA  Cardiovascular: unable to assess due to nature of visit   Gastrointestinal: round, no guarding, no distension  Musculoskeletal: rull ROM all extremities without difficulty  Integumentary: no rashes or lesions noted    Comprehensive Neurological Exam:  Mental Status: AAOx4 Naming, repetition, reading, and writing wnl. Comprehension wnl. No dysarthria.   CN II - XII: No ptosis OU, EOMI without nystagmus, Hearing grossly intact, Face Symmetric, Tongue and Uvula midline, Trapezius symmetric bilateral.   Motor: tone and bulk wnl throughout, no abnormal involuntary or voluntary movements, no satelliting w/orbiting, Fine finger movements wnl b/l, No pronator drift.   Sensory: unable to assess due to nature of visit  Reflexes: unable to assess due to nature of visit  Cerebellar: FNF wnl b/l  Romberg: Negative  Gait: normal, bilat arm swing intact    Assessment:       This is a 38 year old female with PMHX of PEs, HERB, TMJ issues, HTN, GERD, Fibromyalgia, Grave's Disease, Sickle Cell Trait, TIA, who was referred for medically intractable chronic migraine with visual aura with  status migrainosus not responding to Topamax or Elavil. Recent increase in migraines to 7/month, has been out of Emaglity for 3 mths due to pharmacy issue. Utilizes CPA nightly. Now on low sodium diet. Ubrelvy effective  as abortive therapy, Sparingly requires lasmitidan.     1. Chronic migraine with aura without status migrainosus, not intractable  - galcanezumab-gnlm (EMGALITY PEN) 120 mg/mL PnIj; Inject 1 mL (120 mg total) into the skin every 28 days.  Dispense: 1 each; Refill: 11  - ubrogepant (UBRELVY) 100 mg tablet; Take 1 tablet (100 mg total) by mouth daily as needed for Migraine (May repeat in 2 hrs; no more than 2 tabs in 24 hours).  Dispense: 16 tablet; Refill: 2  - topiramate (TOPAMAX) 25 MG tablet; Take 1 tablet (25 mg total) by mouth 2 (two) times daily.  Dispense: 180 tablet; Refill: 2  - topiramate (TOPAMAX) 100 MG tablet; Take 1 tablet (100 mg total) by mouth 2 (two) times daily.  Dispense: 180 tablet; Refill: 3    2. HERB on CPAP    3. Class 2 severe obesity with serious comorbidity and body mass index (BMI) of 39.0 to 39.9 in adult, unspecified obesity type    4. Chronic migraine with aura  - galcanezumab-gnlm (EMGALITY PEN) 120 mg/mL PnIj; Inject 1 mL (120 mg total) into the skin every 28 days.  Dispense: 1 each; Refill: 11      Plan:       [] c/w Ubrelvy 100mg PO PRN migraine - triptans are contraindicated in pts with hx of HTN and TIA  [] c/w Emgality 120mg/mL once per month  [] c/w Lasmiditan 200mg PRN for severe headaches  [] c/w Topamax 125mg BID  [] handout on weight loss  [] call office for migraine >24 hrs and failed abortive therapy; will call in [facility]    RTC 4 MONTHS    Headache education provided - including good sleep hygiene, stress management, medication overuse education provided - using more 3 OTC per week may worsen headaches, High intensity interval training has shown to reduce headache frequency. Low carb, high protein has shown to reduce headache frequency. Patient is  instructed to keep headache diary.    I have explained the treatment plan, diagnosis, and prognosis to the patient, caretaker, family. All questions are answered to the best of my knowledge.    Face to Face Time 30 minute, including, counseling, education, review of test results, relevant medical records, and coordination of care.

## 2024-04-17 LAB — BACTERIA UR CULT: NORMAL

## 2024-04-24 ENCOUNTER — OFFICE VISIT (OUTPATIENT)
Dept: INTERNAL MEDICINE | Facility: CLINIC | Age: 39
End: 2024-04-24
Payer: MEDICAID

## 2024-04-24 ENCOUNTER — HOSPITAL ENCOUNTER (OUTPATIENT)
Dept: RADIOLOGY | Facility: HOSPITAL | Age: 39
Discharge: HOME | End: 2024-04-24
Payer: MEDICAID

## 2024-04-24 VITALS
RESPIRATION RATE: 20 BRPM | TEMPERATURE: 98 F | WEIGHT: 222.38 LBS | SYSTOLIC BLOOD PRESSURE: 101 MMHG | HEIGHT: 62 IN | BODY MASS INDEX: 40.92 KG/M2 | DIASTOLIC BLOOD PRESSURE: 76 MMHG | OXYGEN SATURATION: 97 % | HEART RATE: 110 BPM

## 2024-04-24 DIAGNOSIS — D50.8 IRON DEFICIENCY ANEMIA SECONDARY TO INADEQUATE DIETARY IRON INTAKE: ICD-10-CM

## 2024-04-24 DIAGNOSIS — R05.8 PRODUCTIVE COUGH: ICD-10-CM

## 2024-04-24 DIAGNOSIS — E66.01 CLASS 3 SEVERE OBESITY DUE TO EXCESS CALORIES WITH SERIOUS COMORBIDITY AND BODY MASS INDEX (BMI) OF 40.0 TO 44.9 IN ADULT: ICD-10-CM

## 2024-04-24 DIAGNOSIS — E87.8 HYPERCHLOREMIA: ICD-10-CM

## 2024-04-24 DIAGNOSIS — N34.2 INFECTIVE URETHRITIS: ICD-10-CM

## 2024-04-24 DIAGNOSIS — Z00.00 WELL ADULT EXAM: Primary | ICD-10-CM

## 2024-04-24 DIAGNOSIS — E55.9 VITAMIN D DEFICIENCY: ICD-10-CM

## 2024-04-24 PROBLEM — E66.813 CLASS 3 SEVERE OBESITY DUE TO EXCESS CALORIES WITH SERIOUS COMORBIDITY AND BODY MASS INDEX (BMI) OF 40.0 TO 44.9 IN ADULT: Status: ACTIVE | Noted: 2023-05-08

## 2024-04-24 PROCEDURE — 99215 OFFICE O/P EST HI 40 MIN: CPT | Mod: PBBFAC,25

## 2024-04-24 PROCEDURE — 71046 X-RAY EXAM CHEST 2 VIEWS: CPT | Mod: TC

## 2024-04-24 PROCEDURE — 3044F HG A1C LEVEL LT 7.0%: CPT | Mod: CPTII,,,

## 2024-04-24 PROCEDURE — 3074F SYST BP LT 130 MM HG: CPT | Mod: CPTII,,,

## 2024-04-24 PROCEDURE — 1159F MED LIST DOCD IN RCRD: CPT | Mod: CPTII,,,

## 2024-04-24 PROCEDURE — 99396 PREV VISIT EST AGE 40-64: CPT | Mod: S$PBB,,,

## 2024-04-24 PROCEDURE — 99214 OFFICE O/P EST MOD 30 MIN: CPT | Mod: S$PBB,25,,

## 2024-04-24 PROCEDURE — 3008F BODY MASS INDEX DOCD: CPT | Mod: CPTII,,,

## 2024-04-24 PROCEDURE — 3078F DIAST BP <80 MM HG: CPT | Mod: CPTII,,,

## 2024-04-24 RX ORDER — ASPIRIN 325 MG
50000 TABLET, DELAYED RELEASE (ENTERIC COATED) ORAL
Qty: 12 CAPSULE | Refills: 1 | Status: SHIPPED | OUTPATIENT
Start: 2024-04-24 | End: 2025-04-24

## 2024-04-24 RX ORDER — BENZONATATE 100 MG/1
100 CAPSULE ORAL 3 TIMES DAILY PRN
Qty: 30 CAPSULE | Refills: 0 | Status: SHIPPED | OUTPATIENT
Start: 2024-04-24 | End: 2024-05-04

## 2024-04-24 RX ORDER — METHYLPREDNISOLONE 4 MG/1
TABLET ORAL
Qty: 21 EACH | Refills: 0 | Status: SHIPPED | OUTPATIENT
Start: 2024-04-24

## 2024-04-24 RX ORDER — INDOMETHACIN 50 MG/1
50 CAPSULE ORAL 3 TIMES DAILY
COMMUNITY
Start: 2024-04-18

## 2024-04-24 NOTE — PROGRESS NOTES
PATIENT NAME: Cindy Garcia  : 1985  DATE: 24  MRN: [id number]          Reason for Visit/Chief Complaint   Results, Cough, and Annual Exam       History of Present Illness (HPI)     Cindy Garcia is a 38 y.o. Black female presenting in clinic today Results, Cough, and Annual Exam. PMH PE, optic neuritis, peripheral neuropathy, HERB, Meniere's, TMJ, cluster headaches, GERD, HTN, diverticulosis, DDD w/ herniated disc, Grave's disease, sickle cell trait, TIA, and fibromyalgia. She is followed by [facility] neuro clinic for headaches, Dr. Ramsay (ENT) in [place], Dr. Sharon Casiano (GYN), Dr. Chinmay Benitez (GI) and Dr. Vignesh Jaquez, and [facility] in [place] for multiple PE's, palpitations, and SOB - she was referred to [facility] cardiology clinic, but was a no show on 2024. Patient states she did not receive appt. She was previously followed by Dr. Andersen (rheumatology) and a rheumatology clinic in [place], and Dr. Marciano Ramos (endocrinology). She was seen by Dr. Garcia (rheumatology) on 10/18/2022, his recommendation was for patient to perform low intensity aerobic exercises. No f/u indicated. On 10/24/2023 (LOV), repeat DAVE panel, autoimmune panel all negative. She was referred to [facility] cardiology clinic, but was a no show on 2024.       All pertinent labs dated 4/15/2024 reviewed and discussed with patient.     Patient is experiencing a productive cough with brown sputum with nasal congestion since 4/15/2024. Denies fever. Denies any sick contacts. She was treated with promethazine DM and augmentin without resolve.      Denies smoking, drinking, or illicit drug use. Denies chest pain, shortness of breath, cough, headache, dizziness, weakness, abdominal pain, nausea, vomiting, diarrhea, constipation, dysuria, depression, anxiety, SI, and HI.     Cervical Cancer Screening - Hysterectomy.   Breast Cancer Screening - MMG ordered (2024).  Colon Cancer Screening - Deferred due to  age.  Vaccinations: Declines vaccines.   Eye Exam - Several years. List of local eye doctors provided to patient.  Dental Exam - Several years. List of local dentists provided to patient.       Review of Systems     Review of Systems   Constitutional: Negative.  Negative for chills and fever.   HENT:  Positive for congestion. Negative for sore throat.    Eyes: Negative.    Respiratory:  Positive for cough. Negative for shortness of breath and wheezing.    Cardiovascular: Negative.    Gastrointestinal: Negative.    Endocrine: Negative.    Genitourinary: Negative.    Musculoskeletal: Negative.    Skin: Negative.    Allergic/Immunologic: Negative.    Neurological: Negative.    Hematological: Negative.    Psychiatric/Behavioral: Negative.     All other systems reviewed and are negative.      Medical / Social / Family History     Past Medical History:   Diagnosis Date    Carpal tunnel syndrome     Cluster headaches     Cubital tunnel syndrome     Diabetes mellitus     Digestive disorder     Diverticulosis     Dysphagia     Fibromyalgia     Fluid retention     GERD (gastroesophageal reflux disease)     Graves disease     Graves' disease     Hypertension     Meniere disease     Neuropathy     PE (pulmonary thromboembolism)     Sciatic nerve pain     Sleep apnea     TMJ (dislocation of temporomandibular joint)          Past Surgical History:   Procedure Laterality Date    ADENOIDECTOMY N/A     CARPAL TUNNEL RELEASE Left 05/19/2022    Procedure: RELEASE, CARPAL TUNNEL;  Surgeon: Eben Bolanos MD;  Location: [facility];  Service: Orthopedics;  Laterality: Left;    CARPAL TUNNEL RELEASE Right 06/08/2023    Procedure: RELEASE, CARPAL TUNNEL AND CUBITAL TUNNEL RELEASE;  Surgeon: Shola Hunter MD;  Location: [facility];  Service: Orthopedics;  Laterality: Right;    CHOLECYSTECTOMY N/A     HERNIA REPAIR      HYSTERECTOMY      laryngoscopy and other tracheoscopy N/A 12/23/2014    TONSILLECTOMY      ULNAR TUNNEL RELEASE Left  05/19/2022    Procedure: RELEASE, CUBITAL TUNNEL;  Surgeon: Eben Bolanos MD;  Location: [facility];  Service: Orthopedics;  Laterality: Left;    ULNAR TUNNEL RELEASE Right 06/08/2023    Procedure: RELEASE, CUBITAL TUNNEL;  Surgeon: Shola Hunter MD;  Location: [facility] OR;  Service: Orthopedics;  Laterality: Right;     Social History  Cindy Glynn  reports that she has never smoked. She has never used smokeless tobacco. She reports that she does not currently use alcohol. She reports that she does not use drugs.    Family History  Cindy Garcia's family history includes Arthritis in her mother; Asthma in her daughter; COPD in her mother; Cancer in her paternal aunt; Diabetes in her mother; Hypertension in her mother; Kidney disease in her mother; Stroke in her paternal aunt; Vision loss in her mother.    Medications and Allergies     Medications  Current Outpatient Medications   Medication Instructions    albuterol (PROVENTIL/VENTOLIN HFA) 90 mcg/actuation inhaler 2 puffs, Inhale, every 6 hr, PRN as needed for wheezing, # 8.5 g, 0 Refill(s), Start Date: 09/01/22 1:44:00 CDT    apixaban (ELIQUIS) 5 mg, Oral, Daily    benzonatate (TESSALON) 100 mg, Oral, 3 times daily PRN    busPIRone (BUSPAR) 5 mg, Oral, 2 times daily    cholecalciferol (vitamin D3) 50,000 Units, Oral, Every 7 days    cyclobenzaprine (FLEXERIL) 5-10 mg, Oral, 3 times daily PRN    diltiaZEM (CARDIZEM CD) 120 mg, Oral, Daily    EMGALITY  mg, Subcutaneous, Every 28 days    ferrous sulfate (FEROSUL) 325 mg, Oral, Daily    furosemide (LASIX) 20 mg, Oral, Daily    gabapentin (NEURONTIN) 800 MG tablet ONE TABLET (BY MOUTH) THREE TIMES A DAY    hydrOXYzine HCL (ATARAX) 25 mg, Oral, 3 times daily PRN    indomethacin (INDOCIN) 50 mg, Oral, 3 times daily    lactulose (CHRONULAC) 20 g, Oral, 3 times daily PRN    lasmiditan (REYVOW) tablet 100 mg TAKE 2 TABLETS (BY MOUTH) EVERY DAY AS NEEDED FOR SEVERE HEADACHES ONLY. ALLOW AT LEAST 24  "HOURS BETWEEN DOSES.    linaCLOtide (LINZESS) 72 mcg, Oral, Before breakfast    metFORMIN (GLUCOPHAGE-XR) 1,000 mg, Oral, With breakfast    methylPREDNISolone (MEDROL DOSEPACK) 4 mg tablet use as directed    metoclopramide HCl (REGLAN) 5 mg, Oral, 4 times daily    mirtazapine (REMERON) 7.5 mg, Oral, Nightly    nabumetone (RELAFEN) 500 mg, Oral, Nightly    ondansetron (ZOFRAN) 8 mg, Oral, Every 8 hours PRN    pantoprazole (PROTONIX) 40 mg, Oral, Daily    potassium chloride (KLOR-CON) 10 MEQ TbSR 10 mEq, Oral, 2 times daily    topiramate (TOPAMAX) 25 mg, Oral, 2 times daily    topiramate (TOPAMAX) 100 mg, Oral, 2 times daily    UBRELVY 100 mg, Oral, Daily PRN       Allergies  Review of patient's allergies indicates:  No Known Allergies    Physical Examination   Visit Vitals  /76 (BP Location: Right arm, Patient Position: Sitting, BP Method: Large (Automatic))   Pulse 110   Temp 98.1 °F (36.7 °C) (Oral)   Resp 20   Ht 5' 2.01" (1.575 m)   Wt 100.9 kg (222 lb 6.4 oz)   LMP  (LMP Unknown)   SpO2 97%   BMI 40.67 kg/m²     Physical Exam  Vitals reviewed.   Constitutional:       Appearance: Normal appearance. She is normal weight.   HENT:      Head: Normocephalic and atraumatic.      Right Ear: External ear normal.      Left Ear: External ear normal.      Nose: Nose normal.      Mouth/Throat:      Mouth: Mucous membranes are moist.      Pharynx: Oropharynx is clear.   Eyes:      Extraocular Movements: Extraocular movements intact.      Conjunctiva/sclera: Conjunctivae normal.      Pupils: Pupils are equal, round, and reactive to light.   Cardiovascular:      Rate and Rhythm: Normal rate and regular rhythm.      Pulses: Normal pulses.      Heart sounds: Normal heart sounds.   Pulmonary:      Effort: Pulmonary effort is normal.      Breath sounds: Examination of the left-lower field reveals wheezing. Wheezing present.   Abdominal:      General: Bowel sounds are normal.      Palpations: Abdomen is soft. "   Musculoskeletal:         General: Normal range of motion.      Cervical back: Normal range of motion and neck supple.   Skin:     General: Skin is warm and dry.      Capillary Refill: Capillary refill takes less than 2 seconds.   Neurological:      General: No focal deficit present.      Mental Status: She is alert and oriented to person, place, and time.   Psychiatric:         Mood and Affect: Mood normal.         Behavior: Behavior normal.         Thought Content: Thought content normal.         Judgment: Judgment normal.           Results     Lab Results   Component Value Date    WBC 3.86 (L) 04/15/2024    RBC 4.51 04/15/2024    HGB 13.2 04/15/2024    HCT 39.7 04/15/2024    MCV 88.0 04/15/2024    MCH 29.3 04/15/2024    MCHC 33.2 04/15/2024    RDW 13.0 04/15/2024     04/15/2024    MPV 11.8 (H) 04/15/2024      Lab Results   Component Value Date     04/15/2024    K 3.5 04/15/2024    CHLORIDE 111 (H) 04/15/2024    CO2 20 (L) 04/15/2024    GLUCOSE 149 (H) 04/15/2024    BUN 7.3 04/15/2024    CREATININE 1.18 (H) 04/15/2024    LABPROT 8.1 04/15/2024    ALBUMIN 3.9 04/15/2024    BILITOT 0.7 04/15/2024    ALKPHOS 96 04/15/2024    AST 14 04/15/2024    ALT 16 04/15/2024    AGAP 7.0 05/18/2022    EGFRNORACEVR 60 04/15/2024     Lab Results   Component Value Date    TSH 0.514 04/15/2024     Lab Results   Component Value Date    CHOL 169 04/15/2024    HDL 45 04/15/2024    .00 04/15/2024    TRIG 92 04/15/2024     Lab Results   Component Value Date    COLORUA Light-Yellow 04/15/2024    SGUA 1.013 04/15/2024    PROTEINUA Negative 04/15/2024    GLUCOSEUA Normal 04/15/2024    BILIRUBINUA Negative 04/15/2024    BLOODUA Trace (A) 04/15/2024    WBCUA 11-20 (A) 04/15/2024    RBCUA 0-5 04/15/2024    BACTERIA Moderate (A) 04/15/2024    NITRITESUA Negative 04/15/2024    LEUKOCYTESUR Negative 04/15/2024    UROBILINOGEN Normal 04/15/2024     Lab Results   Component Value Date    CREATRANDUR 143.2 (H) 04/15/2024     MICALBCREAT  04/15/2024      Comment:      Unable to calculate     Lab Results   Component Value Date    [id number] 17.8 (L) 04/15/2024    FOLATE 7.3 08/08/2022     Lab Results   Component Value Date    HIV Nonreactive 08/08/2022    HEPAIGM Nonreactive 08/08/2022    HEPBCOREM Nonreactive 08/08/2022    HEPCAB Nonreactive 08/08/2022     Assessment and Plan (including Health Maintenance)     Problem List Items Addressed This Visit          Pulmonary    Productive cough    Current Assessment & Plan     Rx tessalon pearles.  Rx medrol dose pack.  2V CXR ordered.  Sputum cx ordered.  Increase fluids.  Monitor temperature.  Call clinic for any worsening symptoms.          Relevant Medications    methylPREDNISolone (MEDROL DOSEPACK) 4 mg tablet    benzonatate (TESSALON) 100 MG capsule    Other Relevant Orders    X-Ray Chest PA And Lateral (Completed)    Respiratory Culture       Renal/    Infective urethritis    Current Assessment & Plan      Latest Reference Range & Units 04/15/24 12:27   Color, UA Yellow, Light-Yellow, Dark Yellow, Citlaly, Straw  Light-Yellow   Appearance, UA Clear  Turbid !   Specific Gravity,UA 1.005 - 1.030  1.013   pH, UA 5.0 - 8.5  5.5   Protein, UA Negative  Negative   Glucose, UA Negative, Normal  Normal   Ketones, UA Negative  Negative   Blood, UA Negative  Trace !   NITRITE UA Negative  Negative   Bilirubin, UA Negative  Negative   Urobilinogen, UA 0.2, 1.0, Normal  Normal   Leukocyte Esterase, UA Negative  Negative   RBC, UA None Seen, 0-2, 3-5, 0-5 /HPF 0-5   WBC, UA None Seen, 0-2, 3-5, 0-5 /HPF 11-20 !   Bacteria, UA None Seen /HPF Moderate !   Squamous Epithelial Cells, UA None Seen /HPF Few !   Hyaline Casts, UA None Seen /lpf None Seen   Mucous, UA None Seen /LPF Trace !   !: Data is abnormal    Urine cx negative.  Was treated with augmentin for URI - no need to prescribe further abx.         Relevant Orders    Urinalysis    Urine culture    Hyperchloremia    Current Assessment & Plan      Lab Results   Component Value Date    CHLORIDE 111 (H) 04/15/2024    CHLORIDE 112 (H) 10/16/2023    CHLORIDE 111 (H) 03/27/2023    CHLORIDE 111 (H) 12/12/2022      Ddx: dehydration  Increase water intake.  Repeat CMP next office visit.         Relevant Orders    Comprehensive Metabolic Panel       Oncology    Iron deficiency anemia secondary to inadequate dietary iron intake    Current Assessment & Plan     Lab Results   Component Value Date    HCT 39.7 04/15/2024    HGB 13.2 04/15/2024    FERRITIN 149.60 04/15/2024    TIBC 276 04/15/2024    LABIRON 12 (L) 04/15/2024      Latest Reference Range & Units 04/15/24 12:26   Haptoglobin 35 - 250 mg/dL 176   Retic 1.1 - 2.1 % 1.62   Retic Count Abs 0.016 - 0.078 x10e6/uL 0.0731     Continue ferrous sulfate.  Denies blood in stool.  Take iron supplements as prescribed.  Add Vitamin C to your diet.  Take iron and vitamin C on an empty stomach for better absorption if tolerated.  Add iron rich foods to diet such as liver, lean beef, eggs, dried fruit, dark green leafy vegetables.  Education provided on additional sources of potassium-rich foods.  Do NOT drink milk or take antacids at the same time you take your iron supplement.  Iron supplements can cause constipation; add stool softener or fiber as needed.           Relevant Orders    Ferritin    Iron and TIBC       Endocrine    Vitamin D deficiency    Current Assessment & Plan     Lab Results   Component Value Date    [id number] 17.8 (L) 04/15/2024     Educated on increasing foods high in Vitamin D such as fish oil, cod liver oil, salmon, milk fortified with vitamin D.  RX Vitamin D3 60045 IU twice weekly x 24 weeks.  Complete entire 24 weeks of Vitamin D prescription.  After completion of prescription (24 weeks/6 months), begin taking Vitamin D 5000 I.U. tablets daily (purchase over the counter).  Repeat Vitamin D level as ordered.           Relevant Medications    cholecalciferol, vitamin D3, 1,250 mcg (50,000 unit)  capsule    Other Relevant Orders    Vitamin D    Class 3 severe obesity due to excess calories with serious comorbidity and body mass index (BMI) of 40.0 to 44.9 in adult    Current Assessment & Plan     Body mass index is 40.67 kg/m².  Goal BMI <30.  Aerobic exercise 150 minutes per week.  Avoid soda, simple sugars, sweets, excessive rice, pasta, potatoes or bread.   Choose brown options when available and portion control.  Limit fast foods and fried foods.   Choose complex carbs in moderation (ex: green, leafy vegetables, beans, oatmeal).  Eat plenty of fresh fruits and vegetables with lean meats daily.   Consider permanent healthy lifestyle changes.             Other    Well adult exam - Primary    Current Assessment & Plan     Wellness labs - 4/15/2024.  Cervical Cancer Screening - Hysterectomy.   Breast Cancer Screening - MMG ordered (4/24/2024).  Colon Cancer Screening - Deferred due to age.  Vaccinations: Declines vaccines.   Eye Exam - Several years. List of local eye doctors provided to patient.  Dental Exam - Several years. List of local dentists provided to patient.                Health Maintenance Due   Topic Date Due    Mammogram  Never done    TETANUS VACCINE  06/24/2018    COVID-19 Vaccine (1 - 2023-24 season) Never done     Tests to Keep You Healthy    Mammogram: DUE      Health Maintenance Topics with due status: Not Due       Topic Last Completion Date    Hemoglobin A1c (Prediabetes) 04/15/2024       Future Appointments   Date Time Provider Department Center   7/26/2024 10:40 AM [facility]   8/12/2024  8:30 AM Allison Butts, ANP [facility]        Follow up in about 3 months (around 7/24/2024) for Virtual Visit, Follow up, Med check, Lab review, RTC PRN.          Signature:        JAIMIE Sutton  [facility]  [address] [id number]    Date of encounter: 4/24/24

## 2024-04-24 NOTE — ASSESSMENT & PLAN NOTE
Lab Results   Component Value Date    HCT 39.7 04/15/2024    HGB 13.2 04/15/2024    FERRITIN 149.60 04/15/2024    TIBC 276 04/15/2024    LABIRON 12 (L) 04/15/2024      Latest Reference Range & Units 04/15/24 12:26   Haptoglobin 35 - 250 mg/dL 176   Retic 1.1 - 2.1 % 1.62   Retic Count Abs 0.016 - 0.078 x10e6/uL 0.0731     Continue ferrous sulfate.  Denies blood in stool.  Take iron supplements as prescribed.  Add Vitamin C to your diet.  Take iron and vitamin C on an empty stomach for better absorption if tolerated.  Add iron rich foods to diet such as liver, lean beef, eggs, dried fruit, dark green leafy vegetables.  Education provided on additional sources of potassium-rich foods.  Do NOT drink milk or take antacids at the same time you take your iron supplement.  Iron supplements can cause constipation; add stool softener or fiber as needed.

## 2024-04-24 NOTE — ASSESSMENT & PLAN NOTE
Wellness labs - 4/15/2024.  Cervical Cancer Screening - Hysterectomy.   Breast Cancer Screening - MMG ordered (4/24/2024).  Colon Cancer Screening - Deferred due to age.  Vaccinations: Declines vaccines.   Eye Exam - Several years. List of local eye doctors provided to patient.  Dental Exam - Several years. List of local dentists provided to patient.

## 2024-04-24 NOTE — ASSESSMENT & PLAN NOTE
Latest Reference Range & Units 04/15/24 12:27   Color, UA Yellow, Light-Yellow, Dark Yellow, Citlaly, Straw  Light-Yellow   Appearance, UA Clear  Turbid !   Specific Gravity,UA 1.005 - 1.030  1.013   pH, UA 5.0 - 8.5  5.5   Protein, UA Negative  Negative   Glucose, UA Negative, Normal  Normal   Ketones, UA Negative  Negative   Blood, UA Negative  Trace !   NITRITE UA Negative  Negative   Bilirubin, UA Negative  Negative   Urobilinogen, UA 0.2, 1.0, Normal  Normal   Leukocyte Esterase, UA Negative  Negative   RBC, UA None Seen, 0-2, 3-5, 0-5 /HPF 0-5   WBC, UA None Seen, 0-2, 3-5, 0-5 /HPF 11-20 !   Bacteria, UA None Seen /HPF Moderate !   Squamous Epithelial Cells, UA None Seen /HPF Few !   Hyaline Casts, UA None Seen /lpf None Seen   Mucous, UA None Seen /LPF Trace !   !: Data is abnormal    Urine cx negative.  Was treated with augmentin for URI - no need to prescribe further abx.

## 2024-04-24 NOTE — ASSESSMENT & PLAN NOTE
Rx tessalon pearles.  Rx medrol dose pack.  2V CXR ordered.  Sputum cx ordered.  Increase fluids.  Monitor temperature.  Call clinic for any worsening symptoms.

## 2024-04-24 NOTE — ASSESSMENT & PLAN NOTE
Body mass index is 40.67 kg/m².  Goal BMI <30.  Aerobic exercise 150 minutes per week.  Avoid soda, simple sugars, sweets, excessive rice, pasta, potatoes or bread.   Choose brown options when available and portion control.  Limit fast foods and fried foods.   Choose complex carbs in moderation (ex: green, leafy vegetables, beans, oatmeal).  Eat plenty of fresh fruits and vegetables with lean meats daily.   Consider permanent healthy lifestyle changes.

## 2024-04-24 NOTE — ASSESSMENT & PLAN NOTE
Lab Results   Component Value Date    [id number] 17.8 (L) 04/15/2024     Educated on increasing foods high in Vitamin D such as fish oil, cod liver oil, salmon, milk fortified with vitamin D.  RX Vitamin D3 17176 IU twice weekly x 24 weeks.  Complete entire 24 weeks of Vitamin D prescription.  After completion of prescription (24 weeks/6 months), begin taking Vitamin D 5000 I.U. tablets daily (purchase over the counter).  Repeat Vitamin D level as ordered.

## 2024-04-24 NOTE — ASSESSMENT & PLAN NOTE
Lab Results   Component Value Date    CHLORIDE 111 (H) 04/15/2024    CHLORIDE 112 (H) 10/16/2023    CHLORIDE 111 (H) 03/27/2023    CHLORIDE 111 (H) 12/12/2022      Ddx: dehydration  Increase water intake.  Repeat CMP next office visit.

## 2024-04-24 NOTE — PATIENT INSTRUCTIONS
REMINDER: Please complete labs within 1 week of appointment.   Please complete satisfaction survey when received. Thank you.  Francisco J White,     If you are due for any health screening(s) below please notify me so we can arrange them to be ordered and scheduled. Most healthy patients at your age complete them, but you are free to accept or refuse.     If you can't do it, I'll definitely understand. If you can, I'd certainly appreciate it!    Tests to Keep You Healthy    Mammogram: DUE      Schedule your breast cancer screening today     Breast cancer is the second most common cancer in women,  and the second leading cause of death from cancer. Mammograms can detect breast cancer early, which significantly increases the chances of curing the cancer.       Our records indicate that you may be overdue for breast cancer screening. Cancer screenings save lives, so schedule yours today to stay healthy.     If you recently had a mammogram performed outside of [facility], please let your Health care team know so that they can update your health record.

## 2024-04-30 ENCOUNTER — PATIENT MESSAGE (OUTPATIENT)
Dept: INTERNAL MEDICINE | Facility: CLINIC | Age: 39
End: 2024-04-30
Payer: MEDICAID

## 2024-05-06 ENCOUNTER — TELEPHONE (OUTPATIENT)
Dept: INTERNAL MEDICINE | Facility: CLINIC | Age: 39
End: 2024-05-06
Payer: MEDICAID

## 2024-05-06 NOTE — TELEPHONE ENCOUNTER
Called pt and relayed provider's message. Pt verbalized understanding with no further questions or concerns.

## 2024-05-07 ENCOUNTER — OFFICE VISIT (OUTPATIENT)
Dept: CARDIOLOGY | Facility: CLINIC | Age: 39
End: 2024-05-07
Payer: MEDICAID

## 2024-05-07 VITALS
HEART RATE: 113 BPM | WEIGHT: 222.38 LBS | BODY MASS INDEX: 40.92 KG/M2 | DIASTOLIC BLOOD PRESSURE: 68 MMHG | RESPIRATION RATE: 20 BRPM | HEIGHT: 62 IN | TEMPERATURE: 99 F | SYSTOLIC BLOOD PRESSURE: 105 MMHG | OXYGEN SATURATION: 98 %

## 2024-05-07 DIAGNOSIS — I27.82 CHRONIC PULMONARY EMBOLISM, UNSPECIFIED PULMONARY EMBOLISM TYPE, UNSPECIFIED WHETHER ACUTE COR PULMONALE PRESENT: ICD-10-CM

## 2024-05-07 LAB
OHS QRS DURATION: 66 MS
OHS QTC CALCULATION: 443 MS

## 2024-05-07 PROCEDURE — 99215 OFFICE O/P EST HI 40 MIN: CPT | Mod: PBBFAC,25 | Performed by: INTERNAL MEDICINE

## 2024-05-07 PROCEDURE — 93005 ELECTROCARDIOGRAM TRACING: CPT

## 2024-05-07 NOTE — PATIENT INSTRUCTIONS
Centralized scheduling will contact you directly to schedule your echo (ultrasound of the heart).    Follow up with cardiology in 3 months, appointment attached

## 2024-05-07 NOTE — PROGRESS NOTES
CARDIOLOGY CLINIC NOTE    Patient Name: Cindy Garcia  YOB: 1985  Chief Complaint: initial visit sts has been having chest pain and sob questi     PRESENTING HISTORY   History of Present Illness:  Ms. Cindy Garcia is a 38 y.o. female w/ PMH of migraine, anxiety, hypokalemia, and chronic pulmonary embolism who was referred to cardiology clinic as a new patient to establish care.    Patient was referred to our clinic today for chronic pulmonary emboli.  Patient reports that she had 2 separate instances of pulmonary emboli 1 occurring in 2016 and the other in 2022.  She states that she has been on Eliquis daily since.  She reports that these for unprovoked PE.  She reports no risk factors at time including recent surgery, recent travel, and immobility.  She reports no issues with Eliquis since initiation, such as abnormal bleeding.    Patient reports that she is significantly short of breath minimal exertion.  She states that she becomes short of breath walking from her front door to her mailbox and must stop to rest.  Patient also reports episodic chest pain occurring 1-2 times per day.  She states that these episodes are sharp in nature with radiation to her back.  She reports had episodes of chest pain last proximally 1 hour from onset.  She states that these episodes occur both at rest and on exertion.  She denies lower extremity edema.  She does report orthopnea.    Review of Systems:  12 point review of symptoms negative unless otherwise stated above    PAST HISTORY:     Past Medical History:   Diagnosis Date    Carpal tunnel syndrome     Cluster headaches     Cubital tunnel syndrome     Diabetes mellitus     Digestive disorder     Diverticulosis     Dysphagia     Fibromyalgia     Fluid retention     GERD (gastroesophageal reflux disease)     Graves disease     Graves' disease     Hypertension     Meniere disease     Neuropathy     PE (pulmonary thromboembolism)     Sciatic  nerve pain     Sleep apnea     TMJ (dislocation of temporomandibular joint)       Past Surgical History:   Procedure Laterality Date    ADENOIDECTOMY N/A     CARPAL TUNNEL RELEASE Left 05/19/2022    Procedure: RELEASE, CARPAL TUNNEL;  Surgeon: Eben Bolanos MD;  Location: [facility] OR;  Service: Orthopedics;  Laterality: Left;    CARPAL TUNNEL RELEASE Right 06/08/2023    Procedure: RELEASE, CARPAL TUNNEL AND CUBITAL TUNNEL RELEASE;  Surgeon: Shola Hunter MD;  Location: [facility] OR;  Service: Orthopedics;  Laterality: Right;    CHOLECYSTECTOMY N/A     HERNIA REPAIR      HYSTERECTOMY      laryngoscopy and other tracheoscopy N/A 12/23/2014    TONSILLECTOMY      ULNAR TUNNEL RELEASE Left 05/19/2022    Procedure: RELEASE, CUBITAL TUNNEL;  Surgeon: Eben Bolanos MD;  Location: [facility] OR;  Service: Orthopedics;  Laterality: Left;    ULNAR TUNNEL RELEASE Right 06/08/2023    Procedure: RELEASE, CUBITAL TUNNEL;  Surgeon: Shola Hunter MD;  Location: [facility];  Service: Orthopedics;  Laterality: Right;     Family History   Problem Relation Name Age of Onset    Diabetes Mother Rajani     COPD Mother Rajani     Hypertension Mother Rajani     Arthritis Mother Rajani     Kidney disease Mother Rajani     Vision loss Mother Rajani     Asthma Daughter Jasalyn     Cancer Paternal Aunt Saira     Stroke Paternal Aunt Saira      Social History     Socioeconomic History    Marital status:    Tobacco Use    Smoking status: Never    Smokeless tobacco: Never   Substance and Sexual Activity    Alcohol use: Not Currently    Drug use: Never    Sexual activity: Yes     Birth control/protection: None     MEDICATIONS:     Current Outpatient Medications   Medication Instructions    albuterol (PROVENTIL/VENTOLIN HFA) 90 mcg/actuation inhaler 2 puffs, Inhale, every 6 hr, PRN as needed for wheezing, # 8.5 g, 0 Refill(s), Start Date: 09/01/22 1:44:00 CDT    apixaban (ELIQUIS) 5 mg, Oral, 2 times daily    busPIRone (BUSPAR) 5 mg, Oral, 2 times daily  "   cholecalciferol (vitamin D3) 50,000 Units, Oral, Every 7 days    cyclobenzaprine (FLEXERIL) 5-10 mg, Oral, 3 times daily PRN    diltiaZEM (CARDIZEM CD) 120 mg, Oral, Daily    EMGALITY  mg, Subcutaneous, Every 28 days    ferrous sulfate (FEROSUL) 325 mg, Oral, Daily    furosemide (LASIX) 20 mg, Oral, Daily    gabapentin (NEURONTIN) 800 MG tablet ONE TABLET (BY MOUTH) THREE TIMES A DAY    hydrOXYzine HCL (ATARAX) 25 mg, Oral, 3 times daily PRN    indomethacin (INDOCIN) 50 mg, 3 times daily    lactulose (CHRONULAC) 20 g, Oral, 3 times daily PRN    lasmiditan (REYVOW) tablet 100 mg TAKE 2 TABLETS (BY MOUTH) EVERY DAY AS NEEDED FOR SEVERE HEADACHES ONLY. ALLOW AT LEAST 24 HOURS BETWEEN DOSES.    linaCLOtide (LINZESS) 72 mcg, Oral, Before breakfast    metFORMIN (GLUCOPHAGE-XR) 1,000 mg, Oral, With breakfast    methylPREDNISolone (MEDROL DOSEPACK) 4 mg tablet use as directed    metoclopramide HCl (REGLAN) 5 mg, Oral, 4 times daily    mirtazapine (REMERON) 7.5 mg, Oral, Nightly    nabumetone (RELAFEN) 500 mg, Oral, Nightly    ondansetron (ZOFRAN) 8 mg, Oral, Every 8 hours PRN    pantoprazole (PROTONIX) 40 mg, Oral, Daily    potassium chloride (KLOR-CON) 10 MEQ TbSR 10 mEq, Oral, 2 times daily    topiramate (TOPAMAX) 25 mg, Oral, 2 times daily    topiramate (TOPAMAX) 100 mg, Oral, 2 times daily    UBRELVY 100 mg, Oral, Daily PRN      OBJECTIVE:   Vital Signs:  Vitals:    05/07/24 1315   BP: 105/68   Pulse: (!) 113   Resp: 20   Temp: 98.6 °F (37 °C)   TempSrc: Oral   SpO2: 98%   Weight: 100.9 kg (222 lb 6.4 oz)   Height: 5' 2" (1.575 m)     Physical Exam  Vitals reviewed.   Constitutional:       General: She is not in acute distress.  Cardiovascular:      Rate and Rhythm: Regular rhythm. Tachycardia present.      Pulses: Normal pulses.      Heart sounds: No murmur heard.     No friction rub. No gallop.   Pulmonary:      Breath sounds: Normal breath sounds. No wheezing, rhonchi or rales.   Abdominal:      General: " There is no distension.      Palpations: Abdomen is soft.      Tenderness: There is no abdominal tenderness.   Musculoskeletal:         General: No swelling or tenderness.   Neurological:      General: No focal deficit present.      Mental Status: She is alert and oriented to person, place, and time.   Psychiatric:         Mood and Affect: Mood normal.         Behavior: Behavior normal.       Laboratory  Lab Results   Component Value Date     04/15/2024    K 3.5 04/15/2024    CO2 20 (L) 04/15/2024    BUN 7.3 04/15/2024    CREATININE 1.18 (H) 04/15/2024    CALCIUM 9.8 04/15/2024    BILIDIR 0.2 07/09/2020    IBILI 0.4 07/09/2020    ALKPHOS 96 04/15/2024    AST 14 04/15/2024    ALT 16 04/15/2024      Lab Results   Component Value Date    WBC 3.86 (L) 04/15/2024    RBC 4.51 04/15/2024    HGB 13.2 04/15/2024    HCT 39.7 04/15/2024    MCV 88.0 04/15/2024    MCH 29.3 04/15/2024    MCHC 33.2 04/15/2024    RDW 13.0 04/15/2024     04/15/2024    MPV 11.8 (H) 04/15/2024      Diagnostic Results:  X-Ray Chest PA And Lateral    Result Date: 4/24/2024  EXAMINATION:  XR CHEST PA AND LATERAL    CLINICAL HISTORY:  Other specified cough    TECHNIQUE:  Two views of the chest    COMPARISON:  09/20/2016    FINDINGS:  No focal opacification, pleural effusion, or pneumothorax.    The cardiomediastinal silhouette is within normal limits.    No acute osseous abnormality.    ASSESSMENT & PLAN:     Chronic pulmonary emboli, unprovoked  - patient reports that she was diagnosed with pulmonary emboli in 2016 and 2022  - reports that pulmonary emboli were unprovoked in nature.  Denies risk factors such as extended travel, recent surgery, immobility at the time of diagnosis. On not OCPs.   - has been taking Eliquis 5 mg daily since recent diagnosis.  Denies any adverse effects of Eliquis at this dose such as abnormal bleeding.  - increase Eliquis to 5 mg BID for PE   - in office EKG conducted today displayed sinus tachycardia possibly  indicative of persistent PE  - repeat CBC in approximately 2 weeks after increased dose of Eliquis as initiated  - warning signs/symptoms of Eliquis explained to patient.  ED precautions given.  - consider referral to Hematology to explore possible genetic causes of recurrent PE    Dyspnea on exertion  Chest pain  - in office EKG displaying sinus tachycardia  - will order echo to further evaluate  - consider stress test moving forward.  Currently deferred secondary to untreated PE which could result in false positive leading to further unnecessary testing.     Medications: reconciled, discussed and refills given.  Follow up in about 3 months (around 8/7/2024).    Zhou Floyd DO  05/07/2024, 1:32 PM

## 2024-05-09 NOTE — PROGRESS NOTES
Cardiology attending addendum  Patient was seen and examined with Dr. Zhou Floyd. Agree with plan as outlined below.    Kenzie Murry MD  Cardiology staff-[facility]

## 2024-07-29 PROBLEM — N34.2 INFECTIVE URETHRITIS: Status: RESOLVED | Noted: 2023-04-05 | Resolved: 2024-07-29

## 2024-07-29 PROBLEM — Z00.00 WELL ADULT EXAM: Status: RESOLVED | Noted: 2024-04-24 | Resolved: 2024-07-29

## 2024-07-30 DIAGNOSIS — R52 GENERALIZED PAIN: ICD-10-CM

## 2024-07-31 ENCOUNTER — TELEPHONE (OUTPATIENT)
Dept: INTERNAL MEDICINE | Facility: CLINIC | Age: 39
End: 2024-07-31

## 2024-07-31 RX ORDER — CYCLOBENZAPRINE HCL 5 MG
5-10 TABLET ORAL 3 TIMES DAILY PRN
Qty: 60 TABLET | Refills: 1 | Status: SHIPPED | OUTPATIENT
Start: 2024-07-31 | End: 2025-07-31

## 2024-08-06 ENCOUNTER — LAB VISIT (OUTPATIENT)
Dept: LAB | Facility: HOSPITAL | Age: 39
End: 2024-08-06
Payer: MEDICAID

## 2024-08-06 DIAGNOSIS — E55.9 VITAMIN D DEFICIENCY: ICD-10-CM

## 2024-08-06 DIAGNOSIS — E87.6 HYPOKALEMIA: ICD-10-CM

## 2024-08-06 DIAGNOSIS — R11.0 NAUSEA: ICD-10-CM

## 2024-08-06 DIAGNOSIS — I27.82 CHRONIC PULMONARY EMBOLISM, UNSPECIFIED PULMONARY EMBOLISM TYPE, UNSPECIFIED WHETHER ACUTE COR PULMONALE PRESENT: ICD-10-CM

## 2024-08-06 DIAGNOSIS — K21.9 GASTROESOPHAGEAL REFLUX DISEASE WITHOUT ESOPHAGITIS: ICD-10-CM

## 2024-08-06 DIAGNOSIS — E87.8 HYPERCHLOREMIA: ICD-10-CM

## 2024-08-06 DIAGNOSIS — D50.8 IRON DEFICIENCY ANEMIA SECONDARY TO INADEQUATE DIETARY IRON INTAKE: ICD-10-CM

## 2024-08-06 DIAGNOSIS — N34.2 INFECTIVE URETHRITIS: ICD-10-CM

## 2024-08-06 DIAGNOSIS — G43.E09 CHRONIC MIGRAINE WITH AURA WITHOUT STATUS MIGRAINOSUS, NOT INTRACTABLE: ICD-10-CM

## 2024-08-06 DIAGNOSIS — R52 GENERALIZED PAIN: ICD-10-CM

## 2024-08-06 DIAGNOSIS — K59.04 CHRONIC IDIOPATHIC CONSTIPATION: ICD-10-CM

## 2024-08-06 LAB
25(OH)D3+25(OH)D2 SERPL-MCNC: 17 NG/ML (ref 30–80)
ALBUMIN SERPL-MCNC: 3.7 G/DL (ref 3.5–5)
ALBUMIN/GLOB SERPL: 0.9 RATIO (ref 1.1–2)
ALP SERPL-CCNC: 89 UNIT/L (ref 40–150)
ALT SERPL-CCNC: 20 UNIT/L (ref 0–55)
ANION GAP SERPL CALC-SCNC: 7 MEQ/L
AST SERPL-CCNC: 13 UNIT/L (ref 5–34)
BACTERIA #/AREA URNS AUTO: NORMAL /HPF
BASOPHILS # BLD AUTO: 0.04 X10(3)/MCL
BASOPHILS NFR BLD AUTO: 0.7 %
BILIRUB SERPL-MCNC: 0.4 MG/DL
BILIRUB UR QL STRIP.AUTO: NEGATIVE
BUN SERPL-MCNC: 7.4 MG/DL (ref 7–18.7)
CALCIUM SERPL-MCNC: 9.9 MG/DL (ref 8.4–10.2)
CHLORIDE SERPL-SCNC: 112 MMOL/L (ref 98–107)
CLARITY UR: CLEAR
CO2 SERPL-SCNC: 21 MMOL/L (ref 22–29)
COLOR UR AUTO: NORMAL
CREAT SERPL-MCNC: 1.15 MG/DL (ref 0.55–1.02)
CREAT/UREA NIT SERPL: 6
EOSINOPHIL # BLD AUTO: 0.1 X10(3)/MCL (ref 0–0.9)
EOSINOPHIL NFR BLD AUTO: 1.7 %
ERYTHROCYTE [DISTWIDTH] IN BLOOD BY AUTOMATED COUNT: 13.2 % (ref 11.5–17)
FERRITIN SERPL-MCNC: 149.69 NG/ML (ref 4.63–204)
GFR SERPLBLD CREATININE-BSD FMLA CKD-EPI: >60 ML/MIN/1.73/M2
GLOBULIN SER-MCNC: 4.3 GM/DL (ref 2.4–3.5)
GLUCOSE SERPL-MCNC: 166 MG/DL (ref 74–100)
GLUCOSE UR QL STRIP: NORMAL
HCT VFR BLD AUTO: 39.7 % (ref 37–47)
HGB BLD-MCNC: 13.3 G/DL (ref 12–16)
HGB UR QL STRIP: NEGATIVE
HYALINE CASTS #/AREA URNS LPF: NORMAL /LPF
IMM GRANULOCYTES # BLD AUTO: 0.01 X10(3)/MCL (ref 0–0.04)
IMM GRANULOCYTES NFR BLD AUTO: 0.2 %
IRON SATN MFR SERPL: 19 % (ref 20–50)
IRON SERPL-MCNC: 48 UG/DL (ref 50–170)
KETONES UR QL STRIP: NEGATIVE
LEUKOCYTE ESTERASE UR QL STRIP: NEGATIVE
LYMPHOCYTES # BLD AUTO: 2.22 X10(3)/MCL (ref 0.6–4.6)
LYMPHOCYTES NFR BLD AUTO: 37.7 %
MCH RBC QN AUTO: 30.4 PG (ref 27–31)
MCHC RBC AUTO-ENTMCNC: 33.5 G/DL (ref 33–36)
MCV RBC AUTO: 90.6 FL (ref 80–94)
MONOCYTES # BLD AUTO: 0.19 X10(3)/MCL (ref 0.1–1.3)
MONOCYTES NFR BLD AUTO: 3.2 %
NEUTROPHILS # BLD AUTO: 3.33 X10(3)/MCL (ref 2.1–9.2)
NEUTROPHILS NFR BLD AUTO: 56.5 %
NITRITE UR QL STRIP: NEGATIVE
NRBC BLD AUTO-RTO: 0 %
PH UR STRIP: 5.5 [PH]
PLATELET # BLD AUTO: 204 X10(3)/MCL (ref 130–400)
PMV BLD AUTO: 12.4 FL (ref 7.4–10.4)
POTASSIUM SERPL-SCNC: 3.7 MMOL/L (ref 3.5–5.1)
PROT SERPL-MCNC: 8 GM/DL (ref 6.4–8.3)
PROT UR QL STRIP: NEGATIVE
RBC # BLD AUTO: 4.38 X10(6)/MCL (ref 4.2–5.4)
RBC #/AREA URNS AUTO: NORMAL /HPF
SODIUM SERPL-SCNC: 140 MMOL/L (ref 136–145)
SP GR UR STRIP.AUTO: 1.02 (ref 1–1.03)
SQUAMOUS #/AREA URNS LPF: NORMAL /HPF
TIBC SERPL-MCNC: 211 UG/DL (ref 70–310)
TIBC SERPL-MCNC: 259 UG/DL (ref 250–450)
TRANSFERRIN SERPL-MCNC: 248 MG/DL (ref 180–382)
UROBILINOGEN UR STRIP-ACNC: NORMAL
WBC # BLD AUTO: 5.89 X10(3)/MCL (ref 4.5–11.5)
WBC #/AREA URNS AUTO: NORMAL /HPF

## 2024-08-06 PROCEDURE — 36415 COLL VENOUS BLD VENIPUNCTURE: CPT

## 2024-08-06 PROCEDURE — 87086 URINE CULTURE/COLONY COUNT: CPT

## 2024-08-06 PROCEDURE — 83550 IRON BINDING TEST: CPT

## 2024-08-06 PROCEDURE — 80053 COMPREHEN METABOLIC PANEL: CPT

## 2024-08-06 PROCEDURE — 82728 ASSAY OF FERRITIN: CPT

## 2024-08-06 PROCEDURE — 83540 ASSAY OF IRON: CPT

## 2024-08-06 PROCEDURE — 85025 COMPLETE CBC W/AUTO DIFF WBC: CPT

## 2024-08-06 PROCEDURE — 81015 MICROSCOPIC EXAM OF URINE: CPT

## 2024-08-06 PROCEDURE — 82306 VITAMIN D 25 HYDROXY: CPT

## 2024-08-07 RX ORDER — TOPIRAMATE 25 MG/1
25 TABLET ORAL 2 TIMES DAILY
Qty: 180 TABLET | Refills: 2 | Status: SHIPPED | OUTPATIENT
Start: 2024-08-07 | End: 2025-08-07

## 2024-08-07 RX ORDER — UBROGEPANT 100 MG/1
100 TABLET ORAL DAILY PRN
Qty: 16 TABLET | Refills: 2 | Status: SHIPPED | OUTPATIENT
Start: 2024-08-07

## 2024-08-08 LAB — BACTERIA UR CULT: NO GROWTH

## 2024-08-08 NOTE — TELEPHONE ENCOUNTER
Pt requesting refills on the following medications '          lactulose (CHRONULAC) 20 gram/30 mL Soln      ondansetron (ZOFRAN) 8 MG tablet       albuterol (PROVENTIL/VENTOLIN HFA) 90 mcg/actuation inhaler         diltiaZEM (CARDIZEM CD) 120 MG Cp24        potassium chloride (KLOR-CON) 10 MEQ TbSR         pantoprazole (PROTONIX) 40 MG tablet         linaCLOtide (LINZESS) 72 mcg Cap capsule       nabumetone (RELAFEN) 500 MG tablet         metFORMIN (GLUCOPHAGE-XR) 500 MG ER 24hr tablet        cyclobenzaprine (FLEXERIL) 5 MG tablet     Noted pt has appt on tomorrow 8/9/24

## 2024-08-09 ENCOUNTER — OFFICE VISIT (OUTPATIENT)
Dept: INTERNAL MEDICINE | Facility: CLINIC | Age: 39
End: 2024-08-09
Payer: MEDICAID

## 2024-08-09 DIAGNOSIS — D50.8 IRON DEFICIENCY ANEMIA SECONDARY TO INADEQUATE DIETARY IRON INTAKE: Primary | ICD-10-CM

## 2024-08-09 DIAGNOSIS — E05.90 SUBCLINICAL HYPERTHYROIDISM: ICD-10-CM

## 2024-08-09 DIAGNOSIS — R73.03 PREDIABETES: ICD-10-CM

## 2024-08-09 DIAGNOSIS — Z13.220 SCREENING FOR LIPID DISORDERS: ICD-10-CM

## 2024-08-09 DIAGNOSIS — E55.9 VITAMIN D DEFICIENCY: ICD-10-CM

## 2024-08-09 PROCEDURE — 1160F RVW MEDS BY RX/DR IN RCRD: CPT | Mod: CPTII,95,,

## 2024-08-09 PROCEDURE — 1159F MED LIST DOCD IN RCRD: CPT | Mod: CPTII,95,,

## 2024-08-09 PROCEDURE — 99214 OFFICE O/P EST MOD 30 MIN: CPT | Mod: 95,,,

## 2024-08-09 PROCEDURE — 3044F HG A1C LEVEL LT 7.0%: CPT | Mod: CPTII,95,,

## 2024-08-09 NOTE — PROGRESS NOTES
VISIT DATE: 24    PATIENT NAME: Cindy Garcia  : 1985  MRN: [id number]     The patient location is: [place], [place]  The chief complaint leading to consultation is: Anemia, results    Visit type: audiovisual    Visual time with patient: 12 minutes.  37 minutes of total time spent on the encounter, which includes face to face time and non-face to face time preparing to see the patient (eg, review of tests), Obtaining and/or reviewing separately obtained history, Documenting clinical information in the electronic or other health record, Independently interpreting results (not separately reported) and communicating results to the patient/family/caregiver, or Care coordination (not separately reported).     Each patient to whom he or she provides medical services by telemedicine is:  (1) informed of the relationship between the physician and patient and the respective role of any other health care provider with respect to management of the patient; and (2) notified that he or she may decline to receive medical services by telemedicine and may withdraw from such care at any time.    Reason for visit / Chief Complaint:  Labs Only and Anemia       History of Present Illness (HPI):  Cindy Garcia is a 38 y.o. Black female presenting virtually by audio/visual for Labs Only and Anemia. PMH PE, optic neuritis, peripheral neuropathy, HERB, Meniere's, TMJ, cluster headaches, GERD, HTN, diverticulosis, DDD w/ herniated disc, Grave's disease, sickle cell trait, TIA, and fibromyalgia. She is followed by [facility] neuro clinic for headaches, Dr. Ramsay (ENT) in [place], Dr. Sharon Casiano (GYN), Dr. Chinmay Beintez (GI) and Dr. Vignesh Jaquez, and [facility] in [place] for multiple PE's, palpitations, and SOB - she was referred to [facility] cardiology clinic, but was a no show on 2024. Patient states she did not receive appt. She was previously followed by Dr. Andersen (rheumatology) and a rheumatology clinic in  [place], and Dr. Marciano Ramos (endocrinology). She was seen by Dr. Garcia (rheumatology) on 10/18/2022, his recommendation was for patient to perform low intensity aerobic exercises. No f/u indicated. On 10/24/2023 (LOV), repeat DAVE panel, autoimmune panel all negative. She was referred to [facility] cardiology clinic, but was a no show on 1/4/2024.       All pertinent labs dated 8/6/2024 reviewed and discussed with patient.      Denies smoking, drinking, or illicit drug use. Denies chest pain, shortness of breath, cough, headache, dizziness, weakness, abdominal pain, nausea, vomiting, diarrhea, constipation, dysuria, depression, anxiety, SI, and HI.     Cervical Cancer Screening - Hysterectomy.   Breast Cancer Screening - MMG ordered (4/24/2024).  Colon Cancer Screening - Deferred due to age.  Vaccinations: Declines vaccines.   Eye Exam - Several years. List of local eye doctors provided to patient.  Dental Exam - Several years. List of local dentists provided to patient.         Review of Systems     Review of Systems   Constitutional:  Negative for activity change and unexpected weight change.   HENT:  Negative for hearing loss, rhinorrhea and trouble swallowing.    Eyes:  Negative for discharge and visual disturbance.   Respiratory:  Negative for chest tightness and wheezing.    Cardiovascular:  Negative for chest pain and palpitations.   Gastrointestinal:  Negative for blood in stool, constipation, diarrhea and vomiting.   Endocrine: Negative for polydipsia and polyuria.   Genitourinary:  Negative for difficulty urinating, dysuria, hematuria and menstrual problem.   Musculoskeletal:  Positive for arthralgias. Negative for joint swelling and neck pain.   Neurological:  Positive for weakness and headaches.   Psychiatric/Behavioral:  Negative for confusion and dysphoric mood.        Medical / Social / Family History     Past Medical History:   Diagnosis Date    Carpal tunnel syndrome     Cluster headaches      Cubital tunnel syndrome     Diabetes mellitus     Digestive disorder     Diverticulosis     Dysphagia     Fibromyalgia     Fluid retention     GERD (gastroesophageal reflux disease)     Graves disease     Graves' disease     Hypertension     Meniere disease     Neuropathy     PE (pulmonary thromboembolism)     Sciatic nerve pain     Sleep apnea     TMJ (dislocation of temporomandibular joint)          Past Surgical History:   Procedure Laterality Date    ADENOIDECTOMY N/A     CARPAL TUNNEL RELEASE Left 05/19/2022    Procedure: RELEASE, CARPAL TUNNEL;  Surgeon: Eben Bolanos MD;  Location: [facility];  Service: Orthopedics;  Laterality: Left;    CARPAL TUNNEL RELEASE Right 06/08/2023    Procedure: RELEASE, CARPAL TUNNEL AND CUBITAL TUNNEL RELEASE;  Surgeon: Shola Hunter MD;  Location: [facility] OR;  Service: Orthopedics;  Laterality: Right;    CHOLECYSTECTOMY N/A     HERNIA REPAIR      HYSTERECTOMY      laryngoscopy and other tracheoscopy N/A 12/23/2014    TONSILLECTOMY      ULNAR TUNNEL RELEASE Left 05/19/2022    Procedure: RELEASE, CUBITAL TUNNEL;  Surgeon: Eben Bolanos MD;  Location: [facility] OR;  Service: Orthopedics;  Laterality: Left;    ULNAR TUNNEL RELEASE Right 06/08/2023    Procedure: RELEASE, CUBITAL TUNNEL;  Surgeon: Shola Hunter MD;  Location: [facility];  Service: Orthopedics;  Laterality: Right;         Social History  Cindy Garcia's  reports that she has never smoked. She has never used smokeless tobacco. She reports that she does not currently use alcohol. She reports that she does not use drugs.    Family History  Cindy Garcia's family history includes Arthritis in her mother; Asthma in her daughter; COPD in her mother; Cancer in her paternal aunt; Diabetes in her mother; Hypertension in her mother; Kidney disease in her mother; Stroke in her paternal aunt; Vision loss in her mother.    Medications and Allergies     Medications  Current Outpatient Medications   Medication  Instructions    albuterol (PROVENTIL/VENTOLIN HFA) 90 mcg/actuation inhaler 2 puffs, Inhale, every 6 hr, PRN as needed for wheezing, # 8.5 g, 0 Refill(s), Start Date: 09/01/22 1:44:00 CDT    apixaban (ELIQUIS) 5 mg, Oral, 2 times daily    cholecalciferol (vitamin D3) 50,000 Units, Oral, Every 7 days    cyclobenzaprine (FLEXERIL) 5-10 mg, Oral, 3 times daily PRN    diltiaZEM (CARDIZEM CD) 120 mg, Oral, Daily    EMGALITY  mg, Subcutaneous, Every 28 days    ferrous sulfate (FEROSUL) 325 mg, Oral, Daily    furosemide (LASIX) 20 mg, Oral, Daily    gabapentin (NEURONTIN) 800 MG tablet ONE TABLET (BY MOUTH) THREE TIMES A DAY    lactulose (CHRONULAC) 20 g, Oral, 3 times daily PRN    lasmiditan (REYVOW) tablet 100 mg TAKE 2 TABLETS (BY MOUTH) EVERY DAY AS NEEDED FOR SEVERE HEADACHES ONLY. ALLOW AT LEAST 24 HOURS BETWEEN DOSES.    linaCLOtide (LINZESS) 72 mcg, Oral, Before breakfast    metFORMIN (GLUCOPHAGE-XR) 1,000 mg, Oral, With breakfast    metoclopramide HCl (REGLAN) 5 mg, Oral, 4 times daily    mirtazapine (REMERON) 7.5 mg, Oral, Nightly    nabumetone (RELAFEN) 500 mg, Oral, Nightly    ondansetron (ZOFRAN) 8 mg, Oral, Every 8 hours PRN    pantoprazole (PROTONIX) 40 mg, Oral, Daily    potassium chloride (KLOR-CON) 10 MEQ TbSR 10 mEq, Oral, 2 times daily    topiramate (TOPAMAX) 100 mg, Oral, 2 times daily    topiramate (TOPAMAX) 25 mg, Oral, 2 times daily    UBRELVY 100 mg, Oral, Daily PRN        Allergies  Review of patient's allergies indicates:  No Known Allergies    Physical Examination   No vitals due to virtual visit.  Physical Exam  HENT:      Head: Normocephalic.      Nose: Nose normal.   Eyes:      Extraocular Movements: Extraocular movements intact.      Conjunctiva/sclera: Conjunctivae normal.      Pupils: Pupils are equal, round, and reactive to light.   Pulmonary:      Effort: Pulmonary effort is normal.   Musculoskeletal:      Cervical back: Normal range of motion.   Neurological:      General: No  "focal deficit present.      Mental Status: She is alert and oriented to person, place, and time.   Psychiatric:         Mood and Affect: Mood normal.         Behavior: Behavior normal.         Thought Content: Thought content normal.         Judgment: Judgment normal.           Results     Lab Results   Component Value Date    WBC 5.89 08/06/2024    RBC 4.38 08/06/2024    HGB 13.3 08/06/2024    HCT 39.7 08/06/2024    MCV 90.6 08/06/2024    MCH 30.4 08/06/2024    MCHC 33.5 08/06/2024    RDW 13.2 08/06/2024     08/06/2024    MPV 12.4 (H) 08/06/2024     Lab Results   Component Value Date     08/06/2024    K 3.7 08/06/2024     (H) 08/06/2024    CO2 21 (L) 08/06/2024    BUN 7.4 08/06/2024    CREATININE 1.15 (H) 08/06/2024    CALCIUM 9.9 08/06/2024    ALBUMIN 3.7 08/06/2024    BILITOT 0.4 08/06/2024    ALKPHOS 89 08/06/2024    AST 13 08/06/2024    ALT 20 08/06/2024    EGFRIFAFRICA >60 05/18/2022    EGFRNONAA 56 (L) 10/19/2021     Lab Results   Component Value Date    TSH 0.514 04/15/2024     Lab Results   Component Value Date    CHOL 169 04/15/2024    HDL 45 04/15/2024    .00 04/15/2024    TRIG 92 04/15/2024     Lab Results   Component Value Date    SGUA 1.016 08/06/2024    PROTEINUA Negative 08/06/2024    BILIRUBINUA Negative 08/06/2024    RBCUA 0-5 08/06/2024    BACTERIA None Seen 08/06/2024    NITRITE Negative 08/06/2024    LEUKOCYTESUR Negative 08/06/2024    UROBILINOGEN Normal 08/06/2024      Lab Results   Component Value Date    CREATRANDUR 143.2 (H) 04/15/2024    MICALBCREAT  04/15/2024      Comment:      Unable to calculate     Lab Results   Component Value Date    MZOBEFJF80MR 17 (L) 08/06/2024    FOLATE 7.3 08/08/2022     Lab Results   Component Value Date    HIV Nonreactive 08/08/2022    HEPAIGM Nonreactive 08/08/2022    HEPBSAG Nonreactive 08/08/2022    HEPCAB Nonreactive 08/08/2022     No results found for: "FITDIAG", "COLOGUARD"      Assessment and Plan (including Health " Maintenance)     Problem List Items Addressed This Visit          Oncology    Iron deficiency anemia secondary to inadequate dietary iron intake - Primary    Current Assessment & Plan     Lab Results   Component Value Date    HCT 39.7 08/06/2024    HGB 13.3 08/06/2024    IRON 48 (L) 08/06/2024    FERRITIN 149.69 08/06/2024    TIBC 259 08/06/2024    LABIRON 19 (L) 08/06/2024   Continue ferrous sulfate as prescribed.  Take iron supplements as prescribed.  Add Vitamin C to your diet.  Take iron and vitamin C on an empty stomach for better absorption if tolerated.  Add iron rich foods to diet such as liver, lean beef, eggs, dried fruit, dark green leafy vegetables.  Education provided on additional sources of iron-rich foods.  Do NOT drink milk or take antacids at the same time you take your iron supplement.  Iron supplements can cause constipation; add stool softener or fiber as needed.           Relevant Orders    Iron and TIBC    Ferritin    CBC Auto Differential       Endocrine    Subclinical hyperthyroidism    Relevant Orders    TSH    T4, Free    Vitamin D deficiency    Current Assessment & Plan     Lab Results   Component Value Date    ZVCCFWLC11RX 17 (L) 08/06/2024     Continue weekly vitamin D3 as prescribed.  PTH levels ordered.  Repeat vitamin D level ordered.           Relevant Medications    cholecalciferol, vitamin D3, 1,250 mcg (50,000 unit) capsule    Other Relevant Orders    Vitamin D    PTH, Intact     Other Visit Diagnoses       Prediabetes        Relevant Orders    Urinalysis, Reflex to Urine Culture    Microalbumin/Creatinine Ratio, Urine    Hemoglobin A1C    Comprehensive Metabolic Panel    Screening for lipid disorders        Relevant Orders    Lipid Panel               Health Maintenance Due   Topic Date Due    Mammogram  Never done    TETANUS VACCINE  06/24/2018    COVID-19 Vaccine (1 - 2023-24 season) Never done     Tests to Keep You Healthy    Mammogram: DUE        Health Maintenance Topics  with due status: Not Due       Topic Last Completion Date    Hemoglobin A1c (Prediabetes) 04/15/2024    Influenza Vaccine Not Due       Future Appointments   Date Time Provider Department Center   2/4/2025  1:40 PM Palmira iPnzon FNP [facility] INTMED [place]    2/10/2025  8:30 AM Allison Butts, CHRISTIE [facility] NEURO [facility]        Follow up in about 6 months (around 2/9/2025) for F2F, Follow up, Med check, Lab review, Wellness, RTC PRN.           Signature:  JAIMIE Sutton  [facility]  [address] [facility] [id number]    Date of encounter: 8/9/24

## 2024-08-11 RX ORDER — ALBUTEROL SULFATE 90 UG/1
INHALANT RESPIRATORY (INHALATION)
Qty: 18 G | Refills: 4 | Status: SHIPPED | OUTPATIENT
Start: 2024-08-11 | End: 2025-08-11

## 2024-08-11 RX ORDER — LACTULOSE 10 G/15ML
20 SOLUTION ORAL 3 TIMES DAILY PRN
Qty: 480 ML | Refills: 2 | Status: SHIPPED | OUTPATIENT
Start: 2024-08-11 | End: 2025-08-11

## 2024-08-11 RX ORDER — POTASSIUM CHLORIDE 750 MG/1
10 TABLET, EXTENDED RELEASE ORAL 2 TIMES DAILY
Qty: 180 TABLET | Refills: 2 | Status: SHIPPED | OUTPATIENT
Start: 2024-08-11 | End: 2025-08-11

## 2024-08-11 RX ORDER — CYCLOBENZAPRINE HCL 5 MG
5-10 TABLET ORAL 3 TIMES DAILY PRN
Qty: 60 TABLET | Refills: 4 | Status: SHIPPED | OUTPATIENT
Start: 2024-08-11 | End: 2025-08-11

## 2024-08-11 RX ORDER — DILTIAZEM HYDROCHLORIDE 120 MG/1
120 CAPSULE, COATED, EXTENDED RELEASE ORAL DAILY
Qty: 90 CAPSULE | Refills: 2 | Status: SHIPPED | OUTPATIENT
Start: 2024-08-11 | End: 2025-08-11

## 2024-08-11 RX ORDER — METOCLOPRAMIDE 5 MG/1
5 TABLET ORAL 4 TIMES DAILY
Qty: 180 TABLET | Refills: 1 | OUTPATIENT
Start: 2024-08-11 | End: 2025-08-11

## 2024-08-11 RX ORDER — PANTOPRAZOLE SODIUM 40 MG/1
40 TABLET, DELAYED RELEASE ORAL DAILY
Qty: 90 TABLET | Refills: 2 | Status: SHIPPED | OUTPATIENT
Start: 2024-08-11 | End: 2025-08-11

## 2024-08-11 RX ORDER — NABUMETONE 500 MG/1
500 TABLET, FILM COATED ORAL NIGHTLY
Qty: 90 TABLET | Refills: 2 | Status: SHIPPED | OUTPATIENT
Start: 2024-08-11 | End: 2025-08-11

## 2024-08-11 RX ORDER — METFORMIN HYDROCHLORIDE 500 MG/1
1000 TABLET, EXTENDED RELEASE ORAL
Qty: 180 TABLET | Refills: 2 | Status: SHIPPED | OUTPATIENT
Start: 2024-08-11 | End: 2025-08-11

## 2024-08-11 RX ORDER — ONDANSETRON HYDROCHLORIDE 8 MG/1
8 TABLET, FILM COATED ORAL EVERY 8 HOURS PRN
Qty: 60 TABLET | Refills: 4 | Status: SHIPPED | OUTPATIENT
Start: 2024-08-11 | End: 2025-08-11

## 2024-08-12 ENCOUNTER — OFFICE VISIT (OUTPATIENT)
Dept: NEUROLOGY | Facility: CLINIC | Age: 39
End: 2024-08-12
Payer: MEDICAID

## 2024-08-12 DIAGNOSIS — G47.33 OSA ON CPAP: ICD-10-CM

## 2024-08-12 DIAGNOSIS — E66.01 CLASS 3 SEVERE OBESITY DUE TO EXCESS CALORIES WITH SERIOUS COMORBIDITY AND BODY MASS INDEX (BMI) OF 40.0 TO 44.9 IN ADULT: ICD-10-CM

## 2024-08-12 DIAGNOSIS — G43.E11 INTRACTABLE CHRONIC MIGRAINE WITH AURA WITH STATUS MIGRAINOSUS: Primary | ICD-10-CM

## 2024-08-12 PROCEDURE — 1160F RVW MEDS BY RX/DR IN RCRD: CPT | Mod: CPTII,95,, | Performed by: NURSE PRACTITIONER

## 2024-08-12 PROCEDURE — 3044F HG A1C LEVEL LT 7.0%: CPT | Mod: CPTII,95,, | Performed by: NURSE PRACTITIONER

## 2024-08-12 PROCEDURE — 99214 OFFICE O/P EST MOD 30 MIN: CPT | Mod: 95,,, | Performed by: NURSE PRACTITIONER

## 2024-08-12 PROCEDURE — 1159F MED LIST DOCD IN RCRD: CPT | Mod: CPTII,95,, | Performed by: NURSE PRACTITIONER

## 2024-08-12 RX ORDER — ASPIRIN 325 MG
50000 TABLET, DELAYED RELEASE (ENTERIC COATED) ORAL
Qty: 12 CAPSULE | Refills: 2 | Status: SHIPPED | OUTPATIENT
Start: 2024-08-12 | End: 2025-08-12

## 2024-08-12 NOTE — PROGRESS NOTES
[facility] Neurology Telemedicine Follow Up Visit Note  This is a real-time audio/video visit that was performed with the originating site at patient's home and the distant site, [facility]. Verbal consent to participate in interactive audio & video visit was obtained.    I discussed with the patient regarding the nature of our telehealth visits, that:    - Our sessions are not being recorded and that personal health information is protected  - Provider would evaluate the patient and recommend diagnostics and treatments based on my assessment  - [facility] will provide follow up care in person if/when the patient needs it.     Subjective:      Patient ID: Cindy Garcia is a 38 y.o. female.    Chief Complaint: Migraine (About 5 migraines this month)    HPI  This is a 38 year old female with PMHX of PEs, HERB, TMJ issues, HTN, GERD, Fibromyalgia, Grave's Disease, Sickle Cell Trait, TIA, who was referred for medically intractable chronic migraine with visual aura with status migrainosus not responding to Topamax or Elavil. Patient was last seen on 4/16/2023. During that visit, Emgality 120mg/mL monthly, Ubrelvy 100mg PO BID PRN, Lasmitidan 200mg PO nightly and TPM 125mg PO BID were continued    Today, Pt states migraines improved on Emgality to approximately 5/month.  Last migraine 8/9/2024, Ubrelvy effective as abortive therapy. Sparingly requires Reyvow. Dx w/HERB, utilizes CPAP nightly. Feels refreshed in AM. States she is on a low sodium diet, has improved her bedtime routine. Sleeping better on Remeron. Continues walking daily and walks often during the day at work.    Age of Onset - childhood    Semiology - left temporal, pounding, 10/10, lasting up to 48 hrs, with nausea, photophobia, phonophobia. Associated with Left eye vision peripheral aura.     Frequency - 16 migraine headache days/month; 7 migraine/month on  Emgality    Exacerbating Factors - denied    Risk Factors -  - Family history of headache disorder? sister and daughter with migraine.  - History of Head Trauma? denied  - History of CNS infection? denied  - History of underlying mood disorder? denied  - Illicit drug use? denied  - Tobacco use? denied  - History of sleep disorder? HERB on CPAP. Still staying up the whole night for this past 1 month. Having trouble falling asleep. Stays asleep for 5-6 hours when she falls asleep.     Workup -  - MRI Brain +/- Yassine 11/24/2015 - I have reviewed the study independently and with the patient. Unremarkable  - MRI Brain w/o Yassine 3/1/2018 - I have reviewed the study independently and with the patient. Unremarkable.  - MRI Face/Orbit +/- Yassine 6/5/2018 - I have reviewed the study independently and with the patient. Unremarkable  - MRI Pituitary +/- Yassine 11/13/2019 - I have reviewed the study independently and with the patient. Unremarkable  - Lumbar Puncture:  - MRA Head:  - MRV Head:    Current ppx meds -  TPM 125mg BID (? - present)- effective  Emgality 120mg/mL once per month (5/7/2021 - present) - effective    Current abortive meds -  Tylenol PRN - ineffective. Takes it daily for the six months.  Lasmiditan 200mg PRN for severe headaches (5/7/2021 - present) - partially effective  Ubrelvy 100mg PO BID PRN at onset of migraine  (? - present) - effective    Prior ppx meds -  Amitriptyline 50mg qhs  Effexor 37.5mg daily (8/4/2021 - 2024) - started by mental health provider    Prior abortive meds -   Naproxen 500mg BID PRN  Nurtec ODT 75mg once a day PRN (5/7/2021 - 2/8/2022) - ineffective  Indomethacin 50mg PO TID PRN     Review of Systems   Constitutional: no fever, fatigue, weakness  Eye: no vision loss, eye redness, drainage, or pain  ENMT: no sore throat, ear pain, sinus pain/congestion, nasal congestion/drainage  Respiratory: no cough, no wheezing, no shortness of breath  Cardiovascular: no chest pain, no palpitations, no  edema  Gastrointestinal: no nausea, vomiting, or diarrhea. No abdominal pain  Genitourinary: no dysuria, no urinary frequency or urgency, no hematuria  Hema/Lymph: no abnormal bruising or bleeding  Endocrine: no heat or cold intolerance, no excessive thirst or excessive urination  Musculoskeletal: no muscle or joint pain, no joint swelling  Integumentary: no skin rash or abnormal lesion  Psychiatric: no depressed mood, no anxiety, no visual/auditory hallucinations, no delusions, no suicidal or homicidal ideation  Neurologic: as per HPI    Objective:      Physical Exam    General: no acute distress  Eye: no ptosis, conjunctiva cledar  HENT: round, normocephalic, mucosa moist  Neck: ROM WNL  Respiratory: no distress on RA  Cardiovascular: unable to assess due to nature of visit   Gastrointestinal: round, no guarding, no distension  Musculoskeletal: rull ROM all extremities without difficulty  Integumentary: no rashes or lesions noted    Comprehensive Neurological Exam:  Mental Status: AAOx4 Naming, repetition, reading, and writing wnl. Comprehension wnl. No dysarthria.   CN II - XII: No ptosis OU, EOMI without nystagmus, Hearing grossly intact, Face Symmetric, Tongue and Uvula midline, Trapezius symmetric bilateral.   Motor: tone and bulk wnl throughout, no abnormal involuntary or voluntary movements, no satelliting w/orbiting, Fine finger movements wnl b/l, No pronator drift.   Sensory: unable to assess due to nature of visit  Reflexes: unable to assess due to nature of visit  Cerebellar: FNF wnl b/l  Romberg: Negative  Gait: normal, bilat arm swing intact    Assessment:       This is a 38 year old female with PMHX of PEs, HERB, TMJ issues, HTN, GERD, Fibromyalgia, Grave's Disease, Sickle Cell Trait, TIA, who was referred for medically intractable chronic migraine with visual aura with status migrainosus not responding to Topamax or Elavil. Migraines improved to 5/month on Emgality. Utilizes CPA nightly and feels  refreshed in AM. Intends to continue its use. Now on low sodium diet. Ubrelvy effective  as abortive therapy, Sparingly requires lasmitidan.     1. Intractable chronic migraine with aura with status migrainosus    2. HERB on CPAP    3. Class 3 severe obesity due to excess calories with serious comorbidity and body mass index (BMI) of 40.0 to 44.9 in adult        Plan:       [] c/w Ubrelvy 100mg PO PRN migraine - triptans are contraindicated in pts with hx of HTN and TIA  [] c/w Emgality 120mg/mL once per month  [] c/w Lasmiditan 200mg PRN for severe headaches  [] c/w Topamax 125mg BID  [] handout on weight loss  [] call office for migraine >24 hrs and failed abortive therapy; will call in [facility]    RTC 6 MONTHS    Headache education provided - including good sleep hygiene, stress management, medication overuse education provided - using more 3 OTC per week may worsen headaches, High intensity interval training has shown to reduce headache frequency. Low carb, high protein has shown to reduce headache frequency. Patient is instructed to keep headache diary.    I have explained the treatment plan, diagnosis, and prognosis to the patient, caretaker, family. All questions are answered to the best of my knowledge.    Face to Face Time 30 minute, including, counseling, education, review of test results, relevant medical records, and coordination of care.

## 2024-08-13 NOTE — ASSESSMENT & PLAN NOTE
Lab Results   Component Value Date    DPFPWLGF44MG 17 (L) 08/06/2024     Continue weekly vitamin D3 as prescribed.  PTH levels ordered.  Repeat vitamin D level ordered.

## 2024-08-13 NOTE — ASSESSMENT & PLAN NOTE
Lab Results   Component Value Date    HCT 39.7 08/06/2024    HGB 13.3 08/06/2024    IRON 48 (L) 08/06/2024    FERRITIN 149.69 08/06/2024    TIBC 259 08/06/2024    LABIRON 19 (L) 08/06/2024   Continue ferrous sulfate as prescribed.  Take iron supplements as prescribed.  Add Vitamin C to your diet.  Take iron and vitamin C on an empty stomach for better absorption if tolerated.  Add iron rich foods to diet such as liver, lean beef, eggs, dried fruit, dark green leafy vegetables.  Education provided on additional sources of iron-rich foods.  Do NOT drink milk or take antacids at the same time you take your iron supplement.  Iron supplements can cause constipation; add stool softener or fiber as needed.

## 2024-08-22 RX ORDER — FUROSEMIDE 20 MG/1
20 TABLET ORAL DAILY
Qty: 90 TABLET | Refills: 2 | Status: SHIPPED | OUTPATIENT
Start: 2024-08-22 | End: 2025-08-22

## 2024-08-27 DIAGNOSIS — I27.82 CHRONIC PULMONARY EMBOLISM, UNSPECIFIED PULMONARY EMBOLISM TYPE, UNSPECIFIED WHETHER ACUTE COR PULMONALE PRESENT: ICD-10-CM

## 2024-09-19 DIAGNOSIS — D50.8 IRON DEFICIENCY ANEMIA SECONDARY TO INADEQUATE DIETARY IRON INTAKE: ICD-10-CM

## 2024-09-19 NOTE — TELEPHONE ENCOUNTER
Pharmacy requesting refills on ferrous sulfate (FEROSUL) 325 mg (65 mg iron) Tab tablet.      LOV:8/9/24    NOV: 2/4/25

## 2024-09-20 RX ORDER — FERROUS SULFATE 325(65) MG
325 TABLET ORAL DAILY
Qty: 90 TABLET | Refills: 1 | Status: SHIPPED | OUTPATIENT
Start: 2024-09-20 | End: 2025-09-20

## 2024-10-23 DIAGNOSIS — R11.0 NAUSEA: ICD-10-CM

## 2024-10-23 DIAGNOSIS — R52 GENERALIZED PAIN: ICD-10-CM

## 2024-10-23 DIAGNOSIS — G43.E09 CHRONIC MIGRAINE WITH AURA WITHOUT STATUS MIGRAINOSUS, NOT INTRACTABLE: ICD-10-CM

## 2024-10-23 DIAGNOSIS — K21.9 GASTROESOPHAGEAL REFLUX DISEASE WITHOUT ESOPHAGITIS: ICD-10-CM

## 2024-10-24 RX ORDER — UBROGEPANT 100 MG/1
100 TABLET ORAL DAILY PRN
Qty: 16 TABLET | Refills: 2 | Status: SHIPPED | OUTPATIENT
Start: 2024-10-24

## 2024-10-26 RX ORDER — METOCLOPRAMIDE 5 MG/1
5 TABLET ORAL 4 TIMES DAILY
Qty: 180 TABLET | Refills: 1 | OUTPATIENT
Start: 2024-10-26 | End: 2025-10-26

## 2024-10-26 RX ORDER — PANTOPRAZOLE SODIUM 40 MG/1
40 TABLET, DELAYED RELEASE ORAL DAILY
Qty: 30 TABLET | Refills: 8 | Status: SHIPPED | OUTPATIENT
Start: 2024-10-26 | End: 2025-10-26

## 2024-10-26 RX ORDER — GABAPENTIN 800 MG/1
TABLET ORAL
Qty: 90 TABLET | Refills: 8 | Status: SHIPPED | OUTPATIENT
Start: 2024-10-26 | End: 2025-10-26

## 2024-10-29 DIAGNOSIS — F51.01 PRIMARY INSOMNIA: Primary | ICD-10-CM

## 2024-10-30 RX ORDER — MIRTAZAPINE 15 MG/1
7.5 TABLET, FILM COATED ORAL NIGHTLY
Qty: 15 TABLET | Refills: 8 | Status: SHIPPED | OUTPATIENT
Start: 2024-10-30 | End: 2025-10-30

## 2024-12-19 DIAGNOSIS — I27.82 CHRONIC PULMONARY EMBOLISM, UNSPECIFIED PULMONARY EMBOLISM TYPE, UNSPECIFIED WHETHER ACUTE COR PULMONALE PRESENT: ICD-10-CM

## 2025-01-07 DIAGNOSIS — M79.7 FIBROMYALGIA: Primary | ICD-10-CM

## 2025-01-07 DIAGNOSIS — R52 GENERALIZED PAIN: ICD-10-CM

## 2025-01-07 RX ORDER — CYCLOBENZAPRINE HCL 5 MG
5-10 TABLET ORAL 3 TIMES DAILY PRN
Qty: 60 TABLET | Refills: 4 | Status: SHIPPED | OUTPATIENT
Start: 2025-01-07 | End: 2026-01-07

## 2025-02-04 DIAGNOSIS — K59.04 CHRONIC IDIOPATHIC CONSTIPATION: ICD-10-CM

## 2025-02-17 ENCOUNTER — LAB VISIT (OUTPATIENT)
Dept: LAB | Facility: HOSPITAL | Age: 40
End: 2025-02-17
Payer: MEDICAID

## 2025-02-17 ENCOUNTER — OFFICE VISIT (OUTPATIENT)
Dept: NEUROLOGY | Facility: CLINIC | Age: 40
End: 2025-02-17
Payer: MEDICAID

## 2025-02-17 VITALS
RESPIRATION RATE: 18 BRPM | OXYGEN SATURATION: 99 % | DIASTOLIC BLOOD PRESSURE: 71 MMHG | TEMPERATURE: 98 F | HEIGHT: 62 IN | SYSTOLIC BLOOD PRESSURE: 105 MMHG | WEIGHT: 229 LBS | HEART RATE: 88 BPM | BODY MASS INDEX: 42.14 KG/M2

## 2025-02-17 DIAGNOSIS — R06.83 LOUD SNORING: ICD-10-CM

## 2025-02-17 DIAGNOSIS — G43.E09 CHRONIC MIGRAINE WITH AURA WITHOUT STATUS MIGRAINOSUS, NOT INTRACTABLE: Primary | ICD-10-CM

## 2025-02-17 DIAGNOSIS — D50.8 IRON DEFICIENCY ANEMIA SECONDARY TO INADEQUATE DIETARY IRON INTAKE: ICD-10-CM

## 2025-02-17 DIAGNOSIS — Z13.220 SCREENING FOR LIPID DISORDERS: ICD-10-CM

## 2025-02-17 DIAGNOSIS — G47.33 OSA ON CPAP: ICD-10-CM

## 2025-02-17 DIAGNOSIS — R40.0 DAYTIME SOMNOLENCE: ICD-10-CM

## 2025-02-17 DIAGNOSIS — R73.03 PREDIABETES: ICD-10-CM

## 2025-02-17 DIAGNOSIS — E66.813 CLASS 3 SEVERE OBESITY DUE TO EXCESS CALORIES WITH SERIOUS COMORBIDITY AND BODY MASS INDEX (BMI) OF 40.0 TO 44.9 IN ADULT: ICD-10-CM

## 2025-02-17 DIAGNOSIS — E66.01 CLASS 3 SEVERE OBESITY DUE TO EXCESS CALORIES WITH SERIOUS COMORBIDITY AND BODY MASS INDEX (BMI) OF 40.0 TO 44.9 IN ADULT: ICD-10-CM

## 2025-02-17 DIAGNOSIS — E55.9 VITAMIN D DEFICIENCY: ICD-10-CM

## 2025-02-17 DIAGNOSIS — E05.90 SUBCLINICAL HYPERTHYROIDISM: ICD-10-CM

## 2025-02-17 LAB
25(OH)D3+25(OH)D2 SERPL-MCNC: 13 NG/ML (ref 30–80)
ALBUMIN SERPL-MCNC: 4 G/DL (ref 3.5–5)
ALBUMIN/GLOB SERPL: 0.9 RATIO (ref 1.1–2)
ALP SERPL-CCNC: 100 UNIT/L (ref 40–150)
ALT SERPL-CCNC: 19 UNIT/L (ref 0–55)
ANION GAP SERPL CALC-SCNC: 7 MEQ/L
AST SERPL-CCNC: 15 UNIT/L (ref 5–34)
BACTERIA #/AREA URNS AUTO: ABNORMAL /HPF
BASOPHILS # BLD AUTO: 0.04 X10(3)/MCL
BASOPHILS NFR BLD AUTO: 0.7 %
BILIRUB SERPL-MCNC: 0.6 MG/DL
BILIRUB UR QL STRIP.AUTO: NEGATIVE
BUN SERPL-MCNC: 7.4 MG/DL (ref 7–18.7)
CALCIUM SERPL-MCNC: 9.8 MG/DL (ref 8.4–10.2)
CHLORIDE SERPL-SCNC: 114 MMOL/L (ref 98–107)
CHOLEST SERPL-MCNC: 171 MG/DL
CHOLEST/HDLC SERPL: 4 {RATIO} (ref 0–5)
CLARITY UR: CLEAR
CO2 SERPL-SCNC: 22 MMOL/L (ref 22–29)
COLOR UR AUTO: ABNORMAL
CREAT SERPL-MCNC: 1.05 MG/DL (ref 0.55–1.02)
CREAT UR-MCNC: 208.6 MG/DL (ref 45–106)
CREAT/UREA NIT SERPL: 7
EOSINOPHIL # BLD AUTO: 0.09 X10(3)/MCL (ref 0–0.9)
EOSINOPHIL NFR BLD AUTO: 1.6 %
ERYTHROCYTE [DISTWIDTH] IN BLOOD BY AUTOMATED COUNT: 13 % (ref 11.5–17)
EST. AVERAGE GLUCOSE BLD GHB EST-MCNC: 122.6 MG/DL
FERRITIN SERPL-MCNC: 193.12 NG/ML (ref 4.63–204)
GFR SERPLBLD CREATININE-BSD FMLA CKD-EPI: >60 ML/MIN/1.73/M2
GLOBULIN SER-MCNC: 4.3 GM/DL (ref 2.4–3.5)
GLUCOSE SERPL-MCNC: 137 MG/DL (ref 74–100)
GLUCOSE UR QL STRIP: NORMAL
HBA1C MFR BLD: 5.9 %
HCT VFR BLD AUTO: 40 % (ref 37–47)
HDLC SERPL-MCNC: 44 MG/DL (ref 35–60)
HGB BLD-MCNC: 13.3 G/DL (ref 12–16)
HGB UR QL STRIP: NEGATIVE
HYALINE CASTS #/AREA URNS LPF: ABNORMAL /LPF
IMM GRANULOCYTES # BLD AUTO: 0.01 X10(3)/MCL (ref 0–0.04)
IMM GRANULOCYTES NFR BLD AUTO: 0.2 %
IRON SATN MFR SERPL: 22 % (ref 20–50)
IRON SERPL-MCNC: 55 UG/DL (ref 50–170)
KETONES UR QL STRIP: NEGATIVE
LDLC SERPL CALC-MCNC: 110 MG/DL (ref 50–140)
LEUKOCYTE ESTERASE UR QL STRIP: NEGATIVE
LYMPHOCYTES # BLD AUTO: 2.18 X10(3)/MCL (ref 0.6–4.6)
LYMPHOCYTES NFR BLD AUTO: 39.5 %
MCH RBC QN AUTO: 30.4 PG (ref 27–31)
MCHC RBC AUTO-ENTMCNC: 33.3 G/DL (ref 33–36)
MCV RBC AUTO: 91.3 FL (ref 80–94)
MICROALBUMIN UR-MCNC: 7.3 UG/ML
MICROALBUMIN/CREAT RATIO PNL UR: 3.5 MG/GM CR (ref 0–30)
MONOCYTES # BLD AUTO: 0.24 X10(3)/MCL (ref 0.1–1.3)
MONOCYTES NFR BLD AUTO: 4.3 %
MUCOUS THREADS URNS QL MICRO: ABNORMAL /LPF
NEUTROPHILS # BLD AUTO: 2.96 X10(3)/MCL (ref 2.1–9.2)
NEUTROPHILS NFR BLD AUTO: 53.7 %
NITRITE UR QL STRIP: NEGATIVE
NRBC BLD AUTO-RTO: 0 %
PH UR STRIP: 5.5 [PH]
PLATELET # BLD AUTO: 195 X10(3)/MCL (ref 130–400)
PMV BLD AUTO: 11.9 FL (ref 7.4–10.4)
POTASSIUM SERPL-SCNC: 3.6 MMOL/L (ref 3.5–5.1)
PROT SERPL-MCNC: 8.3 GM/DL (ref 6.4–8.3)
PROT UR QL STRIP: NEGATIVE
PTH-INTACT SERPL-MCNC: 60.1 PG/ML (ref 8.7–77)
RBC # BLD AUTO: 4.38 X10(6)/MCL (ref 4.2–5.4)
RBC #/AREA URNS AUTO: ABNORMAL /HPF
SODIUM SERPL-SCNC: 143 MMOL/L (ref 136–145)
SP GR UR STRIP.AUTO: 1.02 (ref 1–1.03)
SQUAMOUS #/AREA URNS LPF: ABNORMAL /HPF
T4 FREE SERPL-MCNC: 0.9 NG/DL (ref 0.7–1.48)
TIBC SERPL-MCNC: 197 UG/DL (ref 70–310)
TIBC SERPL-MCNC: 252 UG/DL (ref 250–450)
TRANSFERRIN SERPL-MCNC: 235 MG/DL (ref 180–382)
TRIGL SERPL-MCNC: 83 MG/DL (ref 37–140)
TSH SERPL-ACNC: 0.53 UIU/ML (ref 0.35–4.94)
UROBILINOGEN UR STRIP-ACNC: NORMAL
VLDLC SERPL CALC-MCNC: 17 MG/DL
WBC # BLD AUTO: 5.52 X10(3)/MCL (ref 4.5–11.5)
WBC #/AREA URNS AUTO: ABNORMAL /HPF

## 2025-02-17 PROCEDURE — 84439 ASSAY OF FREE THYROXINE: CPT

## 2025-02-17 PROCEDURE — 1160F RVW MEDS BY RX/DR IN RCRD: CPT | Mod: CPTII,,, | Performed by: NURSE PRACTITIONER

## 2025-02-17 PROCEDURE — 85025 COMPLETE CBC W/AUTO DIFF WBC: CPT

## 2025-02-17 PROCEDURE — 1159F MED LIST DOCD IN RCRD: CPT | Mod: CPTII,,, | Performed by: NURSE PRACTITIONER

## 2025-02-17 PROCEDURE — 3008F BODY MASS INDEX DOCD: CPT | Mod: CPTII,,, | Performed by: NURSE PRACTITIONER

## 2025-02-17 PROCEDURE — 82728 ASSAY OF FERRITIN: CPT

## 2025-02-17 PROCEDURE — 82570 ASSAY OF URINE CREATININE: CPT

## 2025-02-17 PROCEDURE — 83970 ASSAY OF PARATHORMONE: CPT

## 2025-02-17 PROCEDURE — 80061 LIPID PANEL: CPT

## 2025-02-17 PROCEDURE — 84443 ASSAY THYROID STIM HORMONE: CPT

## 2025-02-17 PROCEDURE — 82306 VITAMIN D 25 HYDROXY: CPT

## 2025-02-17 PROCEDURE — 3078F DIAST BP <80 MM HG: CPT | Mod: CPTII,,, | Performed by: NURSE PRACTITIONER

## 2025-02-17 PROCEDURE — 83036 HEMOGLOBIN GLYCOSYLATED A1C: CPT

## 2025-02-17 PROCEDURE — 81001 URINALYSIS AUTO W/SCOPE: CPT

## 2025-02-17 PROCEDURE — 3074F SYST BP LT 130 MM HG: CPT | Mod: CPTII,,, | Performed by: NURSE PRACTITIONER

## 2025-02-17 PROCEDURE — 36415 COLL VENOUS BLD VENIPUNCTURE: CPT

## 2025-02-17 PROCEDURE — 99215 OFFICE O/P EST HI 40 MIN: CPT | Mod: PBBFAC | Performed by: NURSE PRACTITIONER

## 2025-02-17 PROCEDURE — 80053 COMPREHEN METABOLIC PANEL: CPT

## 2025-02-17 PROCEDURE — 3044F HG A1C LEVEL LT 7.0%: CPT | Mod: CPTII,,, | Performed by: NURSE PRACTITIONER

## 2025-02-17 PROCEDURE — 83550 IRON BINDING TEST: CPT

## 2025-02-17 RX ORDER — IPRATROPIUM BROMIDE 21 UG/1
SPRAY, METERED NASAL
COMMUNITY
Start: 2025-02-12

## 2025-02-17 RX ORDER — FLUTICASONE PROPIONATE 50 MCG
1 SPRAY, SUSPENSION (ML) NASAL 2 TIMES DAILY
COMMUNITY
Start: 2024-10-03

## 2025-02-17 RX ORDER — TOPIRAMATE 25 MG/1
25 TABLET ORAL 2 TIMES DAILY
Qty: 180 TABLET | Refills: 2 | Status: SHIPPED | OUTPATIENT
Start: 2025-02-17 | End: 2026-02-17

## 2025-02-17 RX ORDER — LASMIDITAN 100 MG/1
TABLET ORAL
Qty: 16 TABLET | Refills: 2 | Status: SHIPPED | OUTPATIENT
Start: 2025-02-17

## 2025-02-17 RX ORDER — UBROGEPANT 100 MG/1
100 TABLET ORAL DAILY PRN
Qty: 16 TABLET | Refills: 2 | Status: SHIPPED | OUTPATIENT
Start: 2025-02-17

## 2025-02-17 RX ORDER — TOPIRAMATE 100 MG/1
100 TABLET, FILM COATED ORAL 2 TIMES DAILY
Qty: 180 TABLET | Refills: 3 | Status: SHIPPED | OUTPATIENT
Start: 2025-02-17 | End: 2026-02-17

## 2025-02-17 RX ORDER — GALCANEZUMAB 120 MG/ML
120 INJECTION, SOLUTION SUBCUTANEOUS
Qty: 1 EACH | Refills: 11 | Status: SHIPPED | OUTPATIENT
Start: 2025-02-17

## 2025-02-17 RX ORDER — CETIRIZINE HYDROCHLORIDE 10 MG/1
10 TABLET ORAL
COMMUNITY
Start: 2025-02-12

## 2025-02-17 NOTE — PROGRESS NOTES
"[facility] Neurology Neurology Follow Up Office Visit Note  This is a real-time audio/video visit that was performed with the originating site at patient's home and the distant site, [facility]. Verbal consent to participate in interactive audio & video visit was obtained.    I discussed with the patient regarding the nature of our telehealth visits, that:    - Our sessions are not being recorded and that personal health information is protected  - Provider would evaluate the patient and recommend diagnostics and treatments based on my assessment  - [facility] will provide follow up care in person if/when the patient needs it.     Subjective:      Patient ID: Cindy Garcia is a 39 y.o. female.    Chief Complaint: Migraine (Pt states no changes in the migraines but she does have a twitch in her R eye)    HPI  This is a 38 YO  F with PMHX of PEs, HERB on CPAP, TMJ issues, HTN, GERD, Fibromyalgia, Grave's Disease, Sickle Cell Trait, TIA, R CTS, R cubital tunnel syndrome, hyperchloremia, hypokalemia, iron deficiency anemia, Vitamin D deficiency, who was referred for medically intractable chronic migraine with visual aura with status migrainosus not responding to Topamax or Elavil. Patient was last seen on 8/12/2024. During that visit, Emgality 120mg/mL monthly, Ubrelvy 100mg PO BID PRN, Lasmitidan 200mg PO nightly and TPM 125mg PO BID were continued    Today, Pt states she continues w/5 migraines per month on Emgality. Ubrelvy effective as abortive therapy for mild migraine. Reyvow effective for "bad" migraines. Continues with intermittent blurred vision, denies photophobia or phonophobia. Last migraine two days ago. Utilizes CPAP machine nightly, does not find it helps with sleep. Has been paying out of pocket for supplies. Continues to feel fatigued during the day. Has had Reyvow on hand due to pharmacy issues. " Concerned about POTS. Discussed POTS dx. Denies syncope.     States she is on a low sodium diet, has improved her bedtime routine. Sleeping better on Remeron. Continues walking daily and walks often during the day at work.    Age of Onset - childhood    Semiology - left temporal, pounding, 10/10, lasting up to 48 hrs, with nausea, photophobia, phonophobia. Associated with Left eye vision peripheral aura.     Frequency - 16 migraine headache days/month; 5 migraine/month on Emgality    Exacerbating Factors - denied    Risk Factors -  - Family history of headache disorder? sister and daughter with migraine.  - History of Head Trauma? denied  - History of CNS infection? denied  - History of underlying mood disorder? denied  - Illicit drug use? denied  - Tobacco use? denied  - History of sleep disorder? HERB on CPAP. Still staying up the whole night for this past 1 month. Having trouble falling asleep. Stays asleep for 5-6 hours when she falls asleep.     Workup -  - MRI Brain +/- Yassine 11/24/2015 - I have reviewed the study independently and with the patient. Unremarkable  - MRI Brain w/o Yassine 3/1/2018 - I have reviewed the study independently and with the patient. Unremarkable.  - MRI Face/Orbit +/- Yassine 6/5/2018 - I have reviewed the study independently and with the patient. Unremarkable  - MRI Pituitary +/- Yassine 11/13/2019 - I have reviewed the study independently and with the patient. Unremarkable  - Lumbar Puncture:  - MRA Head:  - MRV Head:    Current ppx meds -  TPM 125mg BID (? - present)- effective  Emgality 120mg/mL once per month (5/7/2021 - present) - effective    Current abortive meds -  Tylenol PRN - ineffective. Takes it daily for the six months.  Lasmiditan 200mg PRN for severe headaches (5/7/2021 - present) - partially effective  Ubrelvy 100mg PO BID PRN at onset of migraine  (? - present) - effective    Prior ppx meds -  Amitriptyline 50mg qhs  Effexor 37.5mg daily (8/4/2021 - 2024) - started by mental  health provider    Prior abortive meds -   Naproxen 500mg BID PRN  Nurtec ODT 75mg once a day PRN (5/7/2021 - 2/8/2022) - ineffective  Indomethacin 50mg PO TID PRN     Review of Systems   Constitutional: no fever, fatigue, weakness  Eye: no vision loss, eye redness, drainage, or pain  ENMT: no sore throat, ear pain, sinus pain/congestion, nasal congestion/drainage  Respiratory: no cough, no wheezing, no shortness of breath  Cardiovascular: no chest pain, no palpitations, no edema  Gastrointestinal: no nausea, vomiting, or diarrhea. No abdominal pain  Genitourinary: no dysuria, no urinary frequency or urgency, no hematuria  Hema/Lymph: no abnormal bruising or bleeding  Endocrine: no heat or cold intolerance, no excessive thirst or excessive urination  Musculoskeletal: no muscle or joint pain, no joint swelling  Integumentary: no skin rash or abnormal lesion  Psychiatric: no depressed mood, no anxiety, no visual/auditory hallucinations, no delusions, no suicidal or homicidal ideation  Neurologic: as per HPI    Objective:      Physical Exam    General: no acute distress  Eye: no ptosis, conjunctiva cledar  HENT: round, normocephalic, mucosa moist  Neck: ROM WNL  Respiratory: no distress on RA  Cardiovascular: unable to assess due to nature of visit   Gastrointestinal: round, no guarding, no distension  Musculoskeletal: rull ROM all extremities without difficulty  Integumentary: no rashes or lesions noted    Comprehensive Neurological Exam:  Mental Status: AAOx4 Naming, repetition, reading, and writing wnl. Comprehension wnl. No dysarthria.   CN II - XII: No ptosis OU, EOMI without nystagmus, Hearing grossly intact, Face Symmetric, Tongue and Uvula midline, Trapezius symmetric bilateral.   Motor: tone and bulk wnl throughout, no abnormal involuntary or voluntary movements, no satelliting w/orbiting, Fine finger movements wnl b/l, No pronator drift.   Sensory: unable to assess due to nature of visit  Reflexes: unable to  assess due to nature of visit  Cerebellar: FNF wnl b/l  Romberg: Negative  Gait: normal, bilat arm swing intact    Assessment:       This is a 38 YO  F with PMHX of PEs, HERB on CPAP, TMJ issues, HTN, GERD, Fibromyalgia, Grave's Disease, Sickle Cell Trait, TIA,R CTS, R cubital tunnel syndrome, hyperchloremia, hypokalemia, iron deficiency anemia, Vitamin D deficiency who was referred for medically intractable chronic migraine with visual aura with status migrainosus not responding to Topamax or Elavil. Migraines improved to 5/month on Emgality. Utilizes CPA nightly but does not feel refreshed in AM. Not sleeping well at night. States machine has been recalled. Continues with low sodium diet. Ubrelvy effective as abortive therapy, Spar[place] requires lasmitidan.     1. Chronic migraine with aura without status migrainosus, not intractable  - galcanezumab-gnlm (EMGALITY PEN) 120 mg/mL PnIj; Inject 1 mL (120 mg total) into the skin every 28 days.  Dispense: 1 each; Refill: 11  - topiramate (TOPAMAX) 100 MG tablet; Take 1 tablet (100 mg total) by mouth 2 (two) times daily.  Dispense: 180 tablet; Refill: 3  - topiramate (TOPAMAX) 25 MG tablet; Take 1 tablet (25 mg total) by mouth 2 (two) times daily.  Dispense: 180 tablet; Refill: 2  - ubrogepant (UBRELVY) 100 mg tablet; Take 1 tablet (100 mg total) by mouth daily as needed for Migraine (May repeat in 2 hrs; no more than 2 tabs in 24 hours).  Dispense: 16 tablet; Refill: 2  - lasmiditan (REYVOW) tablet 100 mg; TAKE 2 TABLETS (BY MOUTH) EVERY DAY AS NEEDED FOR SEVERE HEADACHES ONLY. ALLOW AT LEAST 24 HOURS BETWEEN DOSES.  Dispense: 16 tablet; Refill: 2    2. Class 3 severe obesity due to excess calories with serious comorbidity and body mass index (BMI) of 40.0 to 44.9 in adult    3. HERB on CPAP    4. Daytime somnolence    5. Loud snoring      Plan:       [] c/w Ubrelvy 100mg PO PRN migraine - triptans are contraindicated in pts with hx of HTN and TIA  [] c/w  Emgality 120mg/mL once per month  [] c/w Lasmiditan 200mg PRN for severe headaches  [] c/w Topamax 125mg BID  [] home sleep study for retesting; machine has been recalled  [] call office for migraine >24 hrs and failed abortive therapy; will call in HA cocktail    RTC 6 MONTHS    Headache education provided - including good sleep hygiene, stress management, medication overuse education provided - using more 3 OTC per week may worsen headaches, High intensity interval training has shown to reduce headache frequency. Low carb, high protein has shown to reduce headache frequency. Patient is instructed to keep headache diary.    I have explained the treatment plan, diagnosis, and prognosis to the patient, caretaker, family. All questions are answered to the best of my knowledge.    Face to Face Time 30 minute, including, counseling, education, review of test results, relevant medical records, and coordination of care.     Allison Butts, NP-C  General Neurology

## 2025-02-23 ENCOUNTER — RESULTS FOLLOW-UP (OUTPATIENT)
Dept: INTERNAL MEDICINE | Facility: CLINIC | Age: 40
End: 2025-02-23

## 2025-04-04 ENCOUNTER — PATIENT MESSAGE (OUTPATIENT)
Dept: INTERNAL MEDICINE | Facility: CLINIC | Age: 40
End: 2025-04-04

## 2025-04-11 ENCOUNTER — OFFICE VISIT (OUTPATIENT)
Dept: INTERNAL MEDICINE | Facility: CLINIC | Age: 40
End: 2025-04-11
Payer: COMMERCIAL

## 2025-04-11 ENCOUNTER — PATIENT MESSAGE (OUTPATIENT)
Dept: INTERNAL MEDICINE | Facility: CLINIC | Age: 40
End: 2025-04-11

## 2025-04-11 VITALS
BODY MASS INDEX: 41.71 KG/M2 | RESPIRATION RATE: 17 BRPM | WEIGHT: 226.63 LBS | HEART RATE: 92 BPM | TEMPERATURE: 98 F | HEIGHT: 62 IN | OXYGEN SATURATION: 99 %

## 2025-04-11 DIAGNOSIS — E87.6 HYPOKALEMIA: ICD-10-CM

## 2025-04-11 DIAGNOSIS — I10 PRIMARY HYPERTENSION: ICD-10-CM

## 2025-04-11 DIAGNOSIS — F51.01 PRIMARY INSOMNIA: ICD-10-CM

## 2025-04-11 DIAGNOSIS — K21.9 GASTROESOPHAGEAL REFLUX DISEASE WITHOUT ESOPHAGITIS: ICD-10-CM

## 2025-04-11 DIAGNOSIS — E55.9 VITAMIN D DEFICIENCY: ICD-10-CM

## 2025-04-11 DIAGNOSIS — R52 GENERALIZED PAIN: ICD-10-CM

## 2025-04-11 DIAGNOSIS — I27.82 CHRONIC PULMONARY EMBOLISM, UNSPECIFIED PULMONARY EMBOLISM TYPE, UNSPECIFIED WHETHER ACUTE COR PULMONALE PRESENT: ICD-10-CM

## 2025-04-11 DIAGNOSIS — R11.0 NAUSEA: ICD-10-CM

## 2025-04-11 DIAGNOSIS — E66.813 CLASS 3 SEVERE OBESITY DUE TO EXCESS CALORIES WITH SERIOUS COMORBIDITY AND BODY MASS INDEX (BMI) OF 40.0 TO 44.9 IN ADULT: ICD-10-CM

## 2025-04-11 DIAGNOSIS — E66.01 CLASS 3 SEVERE OBESITY DUE TO EXCESS CALORIES WITH SERIOUS COMORBIDITY AND BODY MASS INDEX (BMI) OF 40.0 TO 44.9 IN ADULT: ICD-10-CM

## 2025-04-11 DIAGNOSIS — D50.8 IRON DEFICIENCY ANEMIA SECONDARY TO INADEQUATE DIETARY IRON INTAKE: ICD-10-CM

## 2025-04-11 DIAGNOSIS — K59.04 CHRONIC IDIOPATHIC CONSTIPATION: ICD-10-CM

## 2025-04-11 DIAGNOSIS — G47.33 OSA ON CPAP: ICD-10-CM

## 2025-04-11 DIAGNOSIS — R73.03 PREDIABETES: ICD-10-CM

## 2025-04-11 DIAGNOSIS — Z00.00 WELL ADULT EXAM: Primary | ICD-10-CM

## 2025-04-11 PROBLEM — B37.0 ORAL CANDIDIASIS: Status: RESOLVED | Noted: 2023-03-27 | Resolved: 2025-04-11

## 2025-04-11 PROBLEM — R06.83 LOUD SNORING: Status: RESOLVED | Noted: 2025-02-17 | Resolved: 2025-04-11

## 2025-04-11 PROBLEM — R05.8 PRODUCTIVE COUGH: Status: RESOLVED | Noted: 2024-04-24 | Resolved: 2025-04-11

## 2025-04-11 PROBLEM — E87.8 HYPERCHLOREMIA: Status: RESOLVED | Noted: 2024-04-24 | Resolved: 2025-04-11

## 2025-04-11 PROBLEM — R79.82 ELEVATED C-REACTIVE PROTEIN (CRP): Status: RESOLVED | Noted: 2022-08-23 | Resolved: 2025-04-11

## 2025-04-11 PROCEDURE — 99215 OFFICE O/P EST HI 40 MIN: CPT | Mod: PBBFAC

## 2025-04-11 RX ORDER — FUROSEMIDE 20 MG/1
20 TABLET ORAL DAILY
Qty: 90 TABLET | Refills: 2 | Status: SHIPPED | OUTPATIENT
Start: 2025-04-11 | End: 2026-04-11

## 2025-04-11 RX ORDER — PANTOPRAZOLE SODIUM 40 MG/1
40 TABLET, DELAYED RELEASE ORAL DAILY
Qty: 30 TABLET | Refills: 8 | Status: SHIPPED | OUTPATIENT
Start: 2025-04-11 | End: 2026-04-11

## 2025-04-11 RX ORDER — DILTIAZEM HYDROCHLORIDE 120 MG/1
120 CAPSULE, COATED, EXTENDED RELEASE ORAL DAILY
Qty: 90 CAPSULE | Refills: 2 | Status: SHIPPED | OUTPATIENT
Start: 2025-04-11 | End: 2026-04-11

## 2025-04-11 RX ORDER — MIRTAZAPINE 15 MG/1
7.5 TABLET, FILM COATED ORAL NIGHTLY
Qty: 15 TABLET | Refills: 8 | Status: SHIPPED | OUTPATIENT
Start: 2025-04-11 | End: 2026-04-11

## 2025-04-11 RX ORDER — POTASSIUM CHLORIDE 750 MG/1
10 TABLET, EXTENDED RELEASE ORAL 2 TIMES DAILY
Qty: 180 TABLET | Refills: 2 | Status: SHIPPED | OUTPATIENT
Start: 2025-04-11 | End: 2026-04-11

## 2025-04-11 RX ORDER — ASPIRIN 325 MG
50000 TABLET, DELAYED RELEASE (ENTERIC COATED) ORAL
Qty: 24 CAPSULE | Refills: 2 | Status: SHIPPED | OUTPATIENT
Start: 2025-04-14 | End: 2025-04-11

## 2025-04-11 RX ORDER — FERROUS SULFATE 325(65) MG
325 TABLET ORAL DAILY
Qty: 90 TABLET | Refills: 2 | Status: SHIPPED | OUTPATIENT
Start: 2025-04-11 | End: 2026-04-11

## 2025-04-11 RX ORDER — ONDANSETRON HYDROCHLORIDE 8 MG/1
8 TABLET, FILM COATED ORAL EVERY 8 HOURS PRN
Qty: 60 TABLET | Refills: 4 | Status: SHIPPED | OUTPATIENT
Start: 2025-04-11 | End: 2026-04-11

## 2025-04-11 RX ORDER — ASPIRIN 325 MG
50000 TABLET, DELAYED RELEASE (ENTERIC COATED) ORAL
Qty: 24 CAPSULE | Refills: 2 | Status: SHIPPED | OUTPATIENT
Start: 2025-04-11 | End: 2026-04-11

## 2025-04-11 RX ORDER — METFORMIN HYDROCHLORIDE 500 MG/1
1000 TABLET, EXTENDED RELEASE ORAL
Qty: 180 TABLET | Refills: 2 | Status: SHIPPED | OUTPATIENT
Start: 2025-04-11 | End: 2026-04-11

## 2025-04-11 RX ORDER — GABAPENTIN 800 MG/1
TABLET ORAL
Qty: 90 TABLET | Refills: 8 | Status: SHIPPED | OUTPATIENT
Start: 2025-04-11 | End: 2026-04-11

## 2025-04-11 RX ORDER — ALBUTEROL SULFATE 90 UG/1
INHALANT RESPIRATORY (INHALATION)
Qty: 18 G | Refills: 4 | Status: SHIPPED | OUTPATIENT
Start: 2025-04-11 | End: 2026-04-11

## 2025-04-11 RX ORDER — ASPIRIN 325 MG
50000 TABLET, DELAYED RELEASE (ENTERIC COATED) ORAL
Qty: 12 CAPSULE | Refills: 2 | Status: SHIPPED | OUTPATIENT
Start: 2025-04-11 | End: 2025-04-11

## 2025-04-11 RX ORDER — NABUMETONE 500 MG/1
500 TABLET, FILM COATED ORAL NIGHTLY
Qty: 90 TABLET | Refills: 2 | Status: SHIPPED | OUTPATIENT
Start: 2025-04-11 | End: 2026-04-11

## 2025-04-11 RX ORDER — CHOLECALCIFEROL (VITAMIN D3) 25 MCG
1000 TABLET ORAL DAILY
COMMUNITY

## 2025-04-11 NOTE — ASSESSMENT & PLAN NOTE
Continue linzess as prescribed - refilled today.  Educated on high fiber diet and exercise.  Drink at least 64 ozs of water daily.  Eat hot breakfast every morning.   Previously managed by Dr. Chinmay Benitez, will refer to [facility] clinic to eval and treat.

## 2025-04-11 NOTE — ASSESSMENT & PLAN NOTE
Wellness labs - 2/17/2025.   Cervical Cancer Screening - Hysterectomy. Followed by Dr. Sharon Casiano.  Breast Cancer Screening - 11/27/2024 - MMG: Birads-2.  Colon Cancer Screening - Deferred due to age.  Vaccinations: Declines vaccines.   Eye Exam - Several years. List of local eye doctors provided to patient.  Dental Exam - Several years. List of local dentists provided to patient.

## 2025-04-11 NOTE — ASSESSMENT & PLAN NOTE
Continue pantoprazole - refilled today.  Avoid spicy, acidic, fried food and alcohol.    Eat 2-3 hours before bed.   Avoid tight fitting clothes and chew food thoroughly.    Reduce caffeine intake, avoid soda.    Take meds as prescribed.    Referral placed to [facility] GI clinic to eval and treat.

## 2025-04-11 NOTE — ASSESSMENT & PLAN NOTE
Ondansetron refilled.  Can try sugar free popscicles/chuyita tea/lemon to water.  Slow down drinking/eating, avoid gulping, avoid strong smells (drink from a covered container).  Stay with in the diet guidelines, eat slowly(20-30 mins a meal).  Don't over eat, relax at meal times/take your time, avoid foods that cause intolerance.

## 2025-04-11 NOTE — ASSESSMENT & PLAN NOTE
Lab Results   Component Value Date    HGBA1C 5.9 02/17/2025   Continue metformin as prescribed.  Avoid soda, simple sweets, and limit rice/pasta/bread/starches and consume brown options when possible.    Maintain healthy weight with BMI goal <30.    Perform aerobic exercise for 150 minutes per week (or 5 days a week for 30 minutes each day).

## 2025-04-11 NOTE — ASSESSMENT & PLAN NOTE
Continue apixaban - refilled today.  Last visit with [facility] cardiology clinic on 5/7/2024.  No show for Echo on 6/14/2024 and cardiology on 8/8/2025 and 8/15/2025.  Will send cardio clinic to get patient scheduled, will fax Echo order to [facility] to be completed. (Patient lives in [place]).

## 2025-04-11 NOTE — ASSESSMENT & PLAN NOTE
Lab Results   Component Value Date    K 3.6 02/17/2025     Take potassium chloride as prescribed - refilled today.   Incorporated potassium rich foods into you diet such as nuts, seeds, peas, beans (dried), whole grains, fresh fruits and vegetables, lean red meat and yogurt.   Education provided on additional sources of potassium-rich foods including: carrots, broccoli, potatoes, celery, spinach, squash, tomatoes, avocados, apricots, cantaloupe, bananas, nectarines, prunes, figs and raisins.   Seek health care assistance if you experience chest pain, shortness of breath, vomiting or diarrhea lasting more than 2 days, fainting, palpitations, or weakness worsens.

## 2025-04-11 NOTE — ASSESSMENT & PLAN NOTE
Lab Results   Component Value Date    [id number] 13 (L) 02/17/2025     Increase 50,000 vitamin D3 to twice weekly.  PTH, magnesium, phosphorous ordered.  Calcium level - 9.8.

## 2025-04-11 NOTE — PROGRESS NOTES
PATIENT NAME: Cindy Garcia  : 1985  DATE: 25  MRN: [id number]          Reason for Visit/Chief Complaint   Follow-up, Wellness Visit, Lab Review, and Hyperlipidemia       History of Present Illness (HPI)     Cindy Garcia is a 39 y.o. Black or  female presenting in clinic today Follow-up, Wellness Visit, Lab Review, and Hyperlipidemia. PMH PE, optic neuritis, peripheral neuropathy, HERB, Meniere's, TMJ, cluster headaches, GERD, HTN, diverticulosis, DDD w/ herniated disc, Grave's disease, sickle cell trait, TIA, and fibromyalgia.     She is followed by:  [facility] neuro clinic for headaches - last office visit on 2025. Current tx: TPM 125mg BID effective  Emgality 120mg/mL once per month - effective.   Dr. Ramsay (ENT) in [place]  Dr. Sharon Casiano (GYN) - last office visit on 2024.   [facility] cardiology clinic - last office visit 2024. Of note, - in office EKG displaying sinus tachycardia, will order echo to further evaluate, consider stress test moving forward.  Currently deferred secondary to untreated PE which could result in false positive leading to further unnecessary testing. As of today's visit, patient has not completed ECHO.     She was previously followed by Dr. Andersen (rheumatology) and a rheumatology clinic in [place], and Dr. Marciano Ramos (endocrinology). She was seen by Dr. Garcia (rheumatology) on 10/18/2022, his recommendation was for patient to perform low intensity aerobic exercises. No f/u indicated. On 10/24/2023, repeat DAVE panel, autoimmune panel all negative.    Dr. Chinmay Benitez (GI) for h/o GERD, diverticulosis, constipation.  Dr. Vignesh Jaquez, and [facility] in [place] for multiple PE's, palpitations, and SOB.     All pertinent labs dated 2025 reviewed and discussed with patient.     Denies smoking, drinking, or illicit drug use. Denies chest pain, shortness of breath, cough, headache, dizziness, weakness, abdominal pain, nausea,  vomiting, diarrhea, constipation, dysuria, depression, anxiety, SI, and HI.     Cervical Cancer Screening - Hysterectomy. Followed by Dr. Sharon Casiano.  Breast Cancer Screening - 11/27/2024 - MMG: Birads-2.  Colon Cancer Screening - Deferred due to age.  Vaccinations: Declines vaccines.   Eye Exam - Several years. List of local eye doctors provided to patient.  Dental Exam - Several years. List of local dentists provided to patient.     Review of Systems     Review of Systems   Constitutional: Negative.    HENT: Negative.     Eyes: Negative.    Respiratory: Negative.     Cardiovascular: Negative.    Gastrointestinal: Negative.    Endocrine: Negative.    Genitourinary: Negative.    Musculoskeletal: Negative.    Skin: Negative.    Allergic/Immunologic: Negative.    Neurological: Negative.    Hematological: Negative.    Psychiatric/Behavioral: Negative.     All other systems reviewed and are negative.      Medical / Social / Family History     Past Medical History:   Diagnosis Date    Carpal tunnel syndrome     Cluster headaches     Cubital tunnel syndrome     Diabetes mellitus     Digestive disorder     Diverticulosis     Dysphagia     Fibromyalgia     Fluid retention     GERD (gastroesophageal reflux disease)     Graves disease     Graves' disease     Hypertension     Meniere disease     Neuropathy     PE (pulmonary thromboembolism)     Sciatic nerve pain     Sleep apnea     TMJ (dislocation of temporomandibular joint)          Past Surgical History:   Procedure Laterality Date    ADENOIDECTOMY N/A     CARPAL TUNNEL RELEASE Left 05/19/2022    Procedure: RELEASE, CARPAL TUNNEL;  Surgeon: Eben Bolanos MD;  Location: [facility];  Service: Orthopedics;  Laterality: Left;    CARPAL TUNNEL RELEASE Right 06/08/2023    Procedure: RELEASE, CARPAL TUNNEL AND CUBITAL TUNNEL RELEASE;  Surgeon: Shola Hunter MD;  Location: [facility] OR;  Service: Orthopedics;  Laterality: Right;    CHOLECYSTECTOMY N/A     HERNIA REPAIR       HYSTERECTOMY      laryngoscopy and other tracheoscopy N/A 12/23/2014    TONSILLECTOMY      ULNAR TUNNEL RELEASE Left 05/19/2022    Procedure: RELEASE, CUBITAL TUNNEL;  Surgeon: Eben Bolanos MD;  Location: [facility] OR;  Service: Orthopedics;  Laterality: Left;    ULNAR TUNNEL RELEASE Right 06/08/2023    Procedure: RELEASE, CUBITAL TUNNEL;  Surgeon: Shola Hunter MD;  Location: [facility] OR;  Service: Orthopedics;  Laterality: Right;         Social History  Cindy Garcia's  reports that she has never smoked. She has never used smokeless tobacco. She reports that she does not currently use alcohol. She reports that she does not use drugs.    Family History  Cindy Garcia's family history includes Arthritis in her mother; Asthma in her daughter; COPD in her mother; Cancer in her paternal aunt; Diabetes in her mother; Hypertension in her mother; Kidney disease in her mother; Stroke in her paternal aunt; Vision loss in her mother.    Medications and Allergies     Medications  Current Outpatient Medications   Medication Instructions    albuterol (PROVENTIL/VENTOLIN HFA) 90 mcg/actuation inhaler 2 puffs, Inhale, every 6 hr, PRN as needed for wheezing, # 8.5 g, 0 Refill(s), Start Date: 09/01/22 1:44:00 CDT    apixaban (ELIQUIS) 5 mg, Oral, 2 times daily    cetirizine (ZYRTEC) 10 mg    cholecalciferol (vitamin D3) 50,000 Units, Oral, Twice weekly    cyclobenzaprine (FLEXERIL) 5-10 mg, Oral, 3 times daily PRN    diltiaZEM (CARDIZEM CD) 120 mg, Oral, Daily    EMGALITY  mg, Subcutaneous, Every 28 days    ferrous sulfate (FEROSUL) 325 mg, Oral, Daily    fluticasone propionate (FLONASE) 50 mcg/actuation nasal spray 1 spray, 2 times daily    furosemide (LASIX) 20 mg, Oral, Daily    gabapentin (NEURONTIN) 800 MG tablet ONE TABLET (BY MOUTH) THREE TIMES A DAY    lactulose (CHRONULAC) 20 g, Oral, 3 times daily PRN    lasmiditan (REYVOW) tablet 100 mg TAKE 2 TABLETS (BY MOUTH) EVERY DAY AS NEEDED FOR SEVERE HEADACHES ONLY.  "ALLOW AT LEAST 24 HOURS BETWEEN DOSES.    linaCLOtide (LINZESS) 72 mcg, Oral, Before breakfast    metFORMIN (GLUCOPHAGE-XR) 1,000 mg, Oral, With breakfast    mirtazapine (REMERON) 7.5 mg, Oral, Nightly    nabumetone (RELAFEN) 500 mg, Oral, Nightly    ondansetron (ZOFRAN) 8 mg, Oral, Every 8 hours PRN    pantoprazole (PROTONIX) 40 mg, Oral, Daily    potassium chloride (KLOR-CON) 10 MEQ TbSR 10 mEq, Oral, 2 times daily    topiramate (TOPAMAX) 100 mg, Oral, 2 times daily    topiramate (TOPAMAX) 25 mg, Oral, 2 times daily    UBRELVY 100 mg, Oral, Daily PRN    vitamin D (VITAMIN D3) 1,000 Units, Daily       Allergies  Review of patient's allergies indicates:  No Known Allergies    Physical Examination   Visit Vitals  Pulse 92   Temp 98.1 °F (36.7 °C) (Oral)   Resp 17   Ht 5' 2" (1.575 m)   Wt 102.8 kg (226 lb 9.6 oz)   LMP  (LMP Unknown)   SpO2 99%   BMI 41.45 kg/m²     Physical Exam  Vitals reviewed.   Constitutional:       Appearance: Normal appearance. She is normal weight.   HENT:      Head: Normocephalic and atraumatic.      Right Ear: External ear normal.      Left Ear: External ear normal.      Nose: Nose normal.      Mouth/Throat:      Mouth: Mucous membranes are moist.      Pharynx: Oropharynx is clear.   Eyes:      Extraocular Movements: Extraocular movements intact.      Conjunctiva/sclera: Conjunctivae normal.      Pupils: Pupils are equal, round, and reactive to light.   Cardiovascular:      Rate and Rhythm: Normal rate and regular rhythm.      Pulses: Normal pulses.      Heart sounds: Normal heart sounds.   Pulmonary:      Effort: Pulmonary effort is normal.      Breath sounds: Normal breath sounds.   Abdominal:      General: Bowel sounds are normal.      Palpations: Abdomen is soft.   Musculoskeletal:         General: Normal range of motion.      Cervical back: Normal range of motion and neck supple.   Skin:     General: Skin is warm and dry.      Capillary Refill: Capillary refill takes less than 2 " seconds.   Neurological:      General: No focal deficit present.      Mental Status: She is alert and oriented to person, place, and time.   Psychiatric:         Mood and Affect: Mood normal.         Behavior: Behavior normal.         Thought Content: Thought content normal.         Judgment: Judgment normal.           Results     Lab Results   Component Value Date    WBC 5.52 02/17/2025    RBC 4.38 02/17/2025    HGB 13.3 02/17/2025    HCT 40.0 02/17/2025    MCV 91.3 02/17/2025    MCH 30.4 02/17/2025    MCHC 33.3 02/17/2025    RDW 13.0 02/17/2025     02/17/2025    MPV 11.9 (H) 02/17/2025      Lab Results   Component Value Date     02/17/2025    K 3.6 02/17/2025    CO2 22 02/17/2025    GLUCOSE 137 (H) 02/17/2025    BUN 7.4 02/17/2025    CREATININE 1.05 (H) 02/17/2025    LABPROT 8.3 02/17/2025    ALBUMIN 4.0 02/17/2025    BILITOT 0.6 02/17/2025    ALKPHOS 100 02/17/2025    AST 15 02/17/2025    ALT 19 02/17/2025    AGAP 7.0 02/17/2025    EGFRNORACEVR >60 02/17/2025     Lab Results   Component Value Date    TSH 0.532 02/17/2025     Lab Results   Component Value Date    CHOL 171 02/17/2025    HDL 44 02/17/2025    .00 02/17/2025    TRIG 83 02/17/2025     Lab Results   Component Value Date    SGUA 1.017 02/17/2025    PROTEINUA Negative 02/17/2025    BILIRUBINUA Negative 02/17/2025    WBCUA 0-5 02/17/2025    RBCUA 0-5 02/17/2025    BACTERIA Occasional (A) 02/17/2025    LEUKOCYTESUR Negative 02/17/2025    UROBILINOGEN Normal 02/17/2025     Lab Results   Component Value Date    CREATRANDUR 208.6 (H) 02/17/2025    MICALBCREAT 3.5 02/17/2025     Lab Results   Component Value Date    VPDQITCO08NV 13 (L) 02/17/2025    FOLATE 7.3 08/08/2022     Lab Results   Component Value Date    HIV Nonreactive 08/08/2022    HEPAIGM Nonreactive 08/08/2022    HEPBSAG Nonreactive 08/08/2022    HEPCAB Nonreactive 08/08/2022     Assessment and Plan (including Health Maintenance)     Problem List Items Addressed This Visit           Cardiac/Vascular    Primary hypertension    Current Assessment & Plan   BP Readings from Last 3 Encounters:   02/17/25 105/71   05/07/24 105/68   04/24/24 101/76      At goal.  Follow a low sodium (less than 2 grams of sodium per day), DASH diet.   Continue furosemide, diltiazem as prescribed.  Monitor blood pressure and report any consistent values greater than 140/90 and keep a log.  Maintain healthy weight with a BMI goal of <30.   Aerobic exercise for 150 minutes per week (or 5 days a week for 30 minutes each day).          Relevant Medications    diltiaZEM (CARDIZEM CD) 120 MG Cp24    furosemide (LASIX) 20 MG tablet    Other Relevant Orders    Comprehensive Metabolic Panel    CBC Auto Differential    Urinalysis    Urinalysis    Hemoglobin A1C       Renal/    Hypokalemia    Current Assessment & Plan   Lab Results   Component Value Date    K 3.6 02/17/2025     Take potassium chloride as prescribed - refilled today.   Incorporated potassium rich foods into you diet such as nuts, seeds, peas, beans (dried), whole grains, fresh fruits and vegetables, lean red meat and yogurt.   Education provided on additional sources of potassium-rich foods including: carrots, broccoli, potatoes, celery, spinach, squash, tomatoes, avocados, apricots, cantaloupe, bananas, nectarines, prunes, figs and raisins.   Seek health care assistance if you experience chest pain, shortness of breath, vomiting or diarrhea lasting more than 2 days, fainting, palpitations, or weakness worsens.           Relevant Medications    potassium chloride (KLOR-CON) 10 MEQ TbSR       Hematology    Chronic pulmonary embolism    Current Assessment & Plan   Continue apixaban - refilled today.  Last visit with [facility] cardiology clinic on 5/7/2024.  No show for Echo on 6/14/2024 and cardiology on 8/8/2025 and 8/15/2025.  Will send cardio clinic to get patient scheduled, will fax Echo order to [facility] to be completed. (Patient lives in [place]).         Relevant  Medications    albuterol (PROVENTIL/VENTOLIN HFA) 90 mcg/actuation inhaler    apixaban (ELIQUIS) 5 mg Tab       Oncology    Iron deficiency anemia secondary to inadequate dietary iron intake    Current Assessment & Plan   Lab Results   Component Value Date    HCT 40.0 02/17/2025    HGB 13.3 02/17/2025    IRON 55 02/17/2025    FERRITIN 193.12 02/17/2025    TIBC 252 02/17/2025    LABIRON 22 02/17/2025   Continue ferrous sulfate as prescribed.  Take iron supplements as prescribed.  Add Vitamin C to your diet.  Take iron and vitamin C on an empty stomach for better absorption if tolerated.  Add iron rich foods to diet such as liver, lean beef, eggs, dried fruit, dark green leafy vegetables.  Education provided on additional sources of iron-rich foods.  Do NOT drink milk or take antacids at the same time you take your iron supplement.  Iron supplements can cause constipation; add stool softener or fiber as needed.           Relevant Medications    ferrous sulfate (FEROSUL) 325 mg (65 mg iron) Tab tablet    Other Relevant Orders    CBC Auto Differential    Ferritin    Iron and TIBC       Endocrine    Vitamin D deficiency    Current Assessment & Plan   Lab Results   Component Value Date    PNSDIRTY30EO 13 (L) 02/17/2025     Increase 50,000 vitamin D3 to twice weekly.  PTH, magnesium, phosphorous ordered.  Calcium level - 9.8.           Relevant Medications    cholecalciferol, vitamin D3, 1,250 mcg (50,000 unit) capsule    Other Relevant Orders    PTH, Intact    Phosphorus    Magnesium    Vitamin D    Class 3 severe obesity due to excess calories with serious comorbidity and body mass index (BMI) of 40.0 to 44.9 in adult    Current Assessment & Plan   Body mass index is 41.45 kg/m².  Goal BMI <30.  Aerobic exercise 150 minutes per week.  Avoid soda, simple sugars, sweets, excessive rice, pasta, potatoes or bread.   Choose brown options when available and portion control.  Limit fast foods and fried foods.   Choose complex  carbs in moderation (ex: green, leafy vegetables, beans, oatmeal).  Eat plenty of fresh fruits and vegetables with lean meats daily.   Consider permanent healthy lifestyle changes.          Prediabetes    Current Assessment & Plan   Lab Results   Component Value Date    HGBA1C 5.9 02/17/2025   Continue metformin as prescribed.  Avoid soda, simple sweets, and limit rice/pasta/bread/starches and consume brown options when possible.    Maintain healthy weight with BMI goal <30.    Perform aerobic exercise for 150 minutes per week (or 5 days a week for 30 minutes each day).           Relevant Medications    metFORMIN (GLUCOPHAGE-XR) 500 MG ER 24hr tablet    Other Relevant Orders    Urinalysis    Hemoglobin A1C       GI    Gastroesophageal reflux disease without esophagitis    Current Assessment & Plan   Continue pantoprazole - refilled today.  Avoid spicy, acidic, fried food and alcohol.    Eat 2-3 hours before bed.   Avoid tight fitting clothes and chew food thoroughly.    Reduce caffeine intake, avoid soda.    Take meds as prescribed.    Referral placed to [facility] GI clinic to eval and treat.            Relevant Medications    pantoprazole (PROTONIX) 40 MG tablet    Other Relevant Orders    Ambulatory referral/consult to Gastroenterology    Chronic idiopathic constipation    Current Assessment & Plan   Continue linzess as prescribed - refilled today.  Educated on high fiber diet and exercise.  Drink at least 64 ozs of water daily.  Eat hot breakfast every morning.   Previously managed by Dr. Chinmay Benitez, will refer to [facility] GI clinic to eval and treat.         Relevant Medications    lactulose (CHRONULAC) 20 gram/30 mL Soln    linaCLOtide (LINZESS) 72 mcg Cap capsule    Nausea    Current Assessment & Plan   Ondansetron refilled.  Can try sugar free popscicles/chuyita tea/lemon to water.  Slow down drinking/eating, avoid gulping, avoid strong smells (drink from a covered container).  Stay with in the diet guidelines, eat  slowly(20-30 mins a meal).  Don't over eat, relax at meal times/take your time, avoid foods that cause intolerance.             Relevant Medications    ondansetron (ZOFRAN) 8 MG tablet    Other Relevant Orders    Ambulatory referral/consult to Gastroenterology       Other    Generalized pain    Current Assessment & Plan   Gabapentin and relafen refilled today.         Relevant Medications    gabapentin (NEURONTIN) 800 MG tablet    nabumetone (RELAFEN) 500 MG tablet    HERB on CPAP    Current Assessment & Plan   Reports compliance with wearing CPAP nightly. Reports decreased EDS, snoring and fatigue. Pt is benefiting from its use. Expect lifetime use and decreased daytime sleepiness/fatigue. Avoid excessive alcohol, smoking and overuse of sedatives at bedtime.          Well adult exam - Primary    Current Assessment & Plan   Wellness labs - 2/17/2025.   Cervical Cancer Screening - Hysterectomy. Followed by Dr. Sharon Casiano.  Breast Cancer Screening - 11/27/2024 - MMG: Birads-2.  Colon Cancer Screening - Deferred due to age.  Vaccinations: Declines vaccines.   Eye Exam - Several years. List of local eye doctors provided to patient.  Dental Exam - Several years. List of local dentists provided to patient.           Primary insomnia    Current Assessment & Plan   Continue mirtazapine as prescribed - refilled today.  Avoid caffeine, alcohol and stimulants.   Do not use illicit drugs.   Practice positive phrases and repeat throughout the day, yoga, lavender scents or Chamomile tea to promote relaxation.   Set healthy boundaries, avoid people and conversations that increase stress.  Power down electronic devices at least one hour prior to bedtime.   Keep room dark; use eye mask or relaxation sound machine to promote rest.   Sleep hygiene refers to actions that tend to improve and maintain good sleep: Sleep as long as necessary to feel rested (usually seven to eight hours for adults) and then get out of bed.  Maintain a  regular sleep schedule, particularly a regular wake-up time in the morning.  Avoid smoking or other nicotine intake, particularly during the evening.  Avoid daytime naps, especially if they are longer than 20 to 30 minutes or occur late in the day.          Relevant Medications    mirtazapine (REMERON) 15 MG tablet       Health Maintenance Due   Topic Date Due    Mammogram  Never done    COVID-19 Vaccine (1 - 2024-25 season) Never done     Tests to Keep You Healthy    Mammogram: DUE  Last Blood Pressure <= 139/89 (5/7/2024): Yes      Health Maintenance Topics with due status: Not Due       Topic Last Completion Date    Hemoglobin A1c (Prediabetes) 02/17/2025    RSV Vaccine (Age 60+ and Pregnant patients) Not Due       Future Appointments   Date Time Provider Department Center   8/18/2025  1:00 PM Allison Butts, CHRISTIE [facility]   10/17/2025  9:40 AM Palmira Pinzon FNP [facility]        Follow up in about 6 months (around 10/11/2025) for Virtual Visit, Lab review, Med check, RTC PRN.          Signature:        JAIMIE Sutton  [facility]  [address] [facility] [id number]    Date of encounter: 4/11/25    This note was generated with the assistance of ambient listening technology. Verbal consent was obtained by the patient and accompanying visitor(s) for the recording of patient appointment to facilitate this note. I attest to having reviewed and edited the generated note for accuracy, though some syntax or spelling errors may persist. Please contact the author of this note for any clarification.

## 2025-04-11 NOTE — ASSESSMENT & PLAN NOTE
Body mass index is 41.45 kg/m².  Goal BMI <30.  Aerobic exercise 150 minutes per week.  Avoid soda, simple sugars, sweets, excessive rice, pasta, potatoes or bread.   Choose brown options when available and portion control.  Limit fast foods and fried foods.   Choose complex carbs in moderation (ex: green, leafy vegetables, beans, oatmeal).  Eat plenty of fresh fruits and vegetables with lean meats daily.   Consider permanent healthy lifestyle changes.

## 2025-04-11 NOTE — ASSESSMENT & PLAN NOTE
BP Readings from Last 3 Encounters:   02/17/25 105/71   05/07/24 105/68   04/24/24 101/76      At goal.  Follow a low sodium (less than 2 grams of sodium per day), DASH diet.   Continue furosemide, diltiazem as prescribed.  Monitor blood pressure and report any consistent values greater than 140/90 and keep a log.  Maintain healthy weight with a BMI goal of <30.   Aerobic exercise for 150 minutes per week (or 5 days a week for 30 minutes each day).

## 2025-04-11 NOTE — ASSESSMENT & PLAN NOTE
Continue mirtazapine as prescribed - refilled today.  Avoid caffeine, alcohol and stimulants.   Do not use illicit drugs.   Practice positive phrases and repeat throughout the day, yoga, lavender scents or Chamomile tea to promote relaxation.   Set healthy boundaries, avoid people and conversations that increase stress.  Power down electronic devices at least one hour prior to bedtime.   Keep room dark; use eye mask or relaxation sound machine to promote rest.   Sleep hygiene refers to actions that tend to improve and maintain good sleep: Sleep as long as necessary to feel rested (usually seven to eight hours for adults) and then get out of bed.  Maintain a regular sleep schedule, particularly a regular wake-up time in the morning.  Avoid smoking or other nicotine intake, particularly during the evening.  Avoid daytime naps, especially if they are longer than 20 to 30 minutes or occur late in the day.

## 2025-04-11 NOTE — ASSESSMENT & PLAN NOTE
Lab Results   Component Value Date    HCT 40.0 02/17/2025    HGB 13.3 02/17/2025    IRON 55 02/17/2025    FERRITIN 193.12 02/17/2025    TIBC 252 02/17/2025    LABIRON 22 02/17/2025   Continue ferrous sulfate as prescribed.  Take iron supplements as prescribed.  Add Vitamin C to your diet.  Take iron and vitamin C on an empty stomach for better absorption if tolerated.  Add iron rich foods to diet such as liver, lean beef, eggs, dried fruit, dark green leafy vegetables.  Education provided on additional sources of iron-rich foods.  Do NOT drink milk or take antacids at the same time you take your iron supplement.  Iron supplements can cause constipation; add stool softener or fiber as needed.

## 2025-07-24 ENCOUNTER — PATIENT MESSAGE (OUTPATIENT)
Facility: CLINIC | Age: 40
End: 2025-07-24
Payer: COMMERCIAL

## 2025-07-30 DIAGNOSIS — K21.9 GASTROESOPHAGEAL REFLUX DISEASE WITHOUT ESOPHAGITIS: ICD-10-CM

## 2025-07-30 DIAGNOSIS — G43.E09 CHRONIC MIGRAINE WITH AURA WITHOUT STATUS MIGRAINOSUS, NOT INTRACTABLE: ICD-10-CM

## 2025-07-30 DIAGNOSIS — I27.82 CHRONIC PULMONARY EMBOLISM, UNSPECIFIED PULMONARY EMBOLISM TYPE, UNSPECIFIED WHETHER ACUTE COR PULMONALE PRESENT: ICD-10-CM

## 2025-07-30 DIAGNOSIS — M79.7 FIBROMYALGIA: ICD-10-CM

## 2025-07-30 DIAGNOSIS — E55.9 VITAMIN D DEFICIENCY: ICD-10-CM

## 2025-07-30 DIAGNOSIS — R11.0 NAUSEA: ICD-10-CM

## 2025-07-30 DIAGNOSIS — I10 PRIMARY HYPERTENSION: ICD-10-CM

## 2025-07-30 DIAGNOSIS — F51.01 PRIMARY INSOMNIA: ICD-10-CM

## 2025-07-30 DIAGNOSIS — K59.04 CHRONIC IDIOPATHIC CONSTIPATION: ICD-10-CM

## 2025-07-30 DIAGNOSIS — D50.8 IRON DEFICIENCY ANEMIA SECONDARY TO INADEQUATE DIETARY IRON INTAKE: ICD-10-CM

## 2025-07-30 DIAGNOSIS — R52 GENERALIZED PAIN: ICD-10-CM

## 2025-07-30 DIAGNOSIS — E87.6 HYPOKALEMIA: ICD-10-CM

## 2025-07-30 DIAGNOSIS — R73.03 PREDIABETES: ICD-10-CM

## 2025-07-30 RX ORDER — MIRTAZAPINE 15 MG/1
7.5 TABLET, FILM COATED ORAL NIGHTLY
Qty: 15 TABLET | Refills: 8 | Status: CANCELLED | OUTPATIENT
Start: 2025-07-30 | End: 2026-07-30

## 2025-07-30 RX ORDER — ASPIRIN 325 MG
50000 TABLET, DELAYED RELEASE (ENTERIC COATED) ORAL
Qty: 24 CAPSULE | Refills: 2 | Status: CANCELLED | OUTPATIENT
Start: 2025-07-31 | End: 2026-07-31

## 2025-07-30 RX ORDER — PANTOPRAZOLE SODIUM 40 MG/1
40 TABLET, DELAYED RELEASE ORAL DAILY
Qty: 30 TABLET | Refills: 8 | Status: CANCELLED | OUTPATIENT
Start: 2025-07-30 | End: 2026-07-30

## 2025-07-30 RX ORDER — ONDANSETRON 8 MG/1
8 TABLET, FILM COATED ORAL EVERY 8 HOURS PRN
Qty: 60 TABLET | Refills: 4 | Status: CANCELLED | OUTPATIENT
Start: 2025-07-30 | End: 2026-07-30

## 2025-07-30 RX ORDER — GABAPENTIN 800 MG/1
TABLET ORAL
Qty: 90 TABLET | Refills: 8 | Status: CANCELLED | OUTPATIENT
Start: 2025-07-30 | End: 2026-07-30

## 2025-07-30 RX ORDER — ALBUTEROL SULFATE 90 UG/1
INHALANT RESPIRATORY (INHALATION)
Qty: 18 G | Refills: 4 | Status: CANCELLED | OUTPATIENT
Start: 2025-07-30 | End: 2026-07-30

## 2025-07-31 DIAGNOSIS — G43.E09 CHRONIC MIGRAINE WITH AURA WITHOUT STATUS MIGRAINOSUS, NOT INTRACTABLE: ICD-10-CM

## 2025-07-31 DIAGNOSIS — D50.8 IRON DEFICIENCY ANEMIA SECONDARY TO INADEQUATE DIETARY IRON INTAKE: ICD-10-CM

## 2025-07-31 DIAGNOSIS — R73.03 PREDIABETES: ICD-10-CM

## 2025-07-31 RX ORDER — FERROUS SULFATE 325(65) MG
325 TABLET ORAL DAILY
Qty: 90 TABLET | Refills: 2 | Status: CANCELLED | OUTPATIENT
Start: 2025-07-31 | End: 2026-07-31

## 2025-07-31 RX ORDER — NABUMETONE 500 MG/1
500 TABLET, FILM COATED ORAL NIGHTLY
Qty: 90 TABLET | Refills: 2 | Status: SHIPPED | OUTPATIENT
Start: 2025-07-31 | End: 2026-07-31

## 2025-07-31 RX ORDER — METFORMIN HYDROCHLORIDE 500 MG/1
1000 TABLET, EXTENDED RELEASE ORAL
Qty: 180 TABLET | Refills: 2 | Status: CANCELLED | OUTPATIENT
Start: 2025-07-31 | End: 2026-07-31

## 2025-07-31 RX ORDER — METFORMIN HYDROCHLORIDE 500 MG/1
1000 TABLET, EXTENDED RELEASE ORAL
Qty: 180 TABLET | Refills: 2 | Status: SHIPPED | OUTPATIENT
Start: 2025-07-31 | End: 2026-07-31

## 2025-07-31 RX ORDER — FUROSEMIDE 20 MG/1
20 TABLET ORAL DAILY
Qty: 90 TABLET | Refills: 2 | Status: SHIPPED | OUTPATIENT
Start: 2025-07-31 | End: 2026-07-31

## 2025-07-31 RX ORDER — TOPIRAMATE 100 MG/1
100 TABLET, FILM COATED ORAL 2 TIMES DAILY
Qty: 180 TABLET | Refills: 0 | Status: SHIPPED | OUTPATIENT
Start: 2025-07-31 | End: 2026-07-31

## 2025-07-31 RX ORDER — LASMIDITAN 100 MG/1
TABLET ORAL
Qty: 16 TABLET | Refills: 2 | Status: SHIPPED | OUTPATIENT
Start: 2025-07-31

## 2025-07-31 RX ORDER — FERROUS SULFATE 325(65) MG
325 TABLET ORAL DAILY
Qty: 90 TABLET | Refills: 2 | Status: SHIPPED | OUTPATIENT
Start: 2025-07-31 | End: 2026-07-31

## 2025-07-31 RX ORDER — TOPIRAMATE 25 MG/1
25 TABLET, FILM COATED ORAL 2 TIMES DAILY
Qty: 180 TABLET | Refills: 2 | Status: SHIPPED | OUTPATIENT
Start: 2025-07-31 | End: 2025-07-31 | Stop reason: SDUPTHER

## 2025-07-31 RX ORDER — GALCANEZUMAB 120 MG/ML
120 INJECTION, SOLUTION SUBCUTANEOUS
Qty: 1 EACH | Refills: 2 | Status: SHIPPED | OUTPATIENT
Start: 2025-07-31

## 2025-07-31 RX ORDER — TOPIRAMATE 25 MG/1
25 TABLET, FILM COATED ORAL 2 TIMES DAILY
Qty: 180 TABLET | Refills: 2 | Status: SHIPPED | OUTPATIENT
Start: 2025-07-31 | End: 2026-07-31

## 2025-07-31 RX ORDER — DILTIAZEM HYDROCHLORIDE 120 MG/1
120 CAPSULE, COATED, EXTENDED RELEASE ORAL DAILY
Qty: 90 CAPSULE | Refills: 2 | Status: SHIPPED | OUTPATIENT
Start: 2025-07-31 | End: 2026-07-31

## 2025-07-31 RX ORDER — CYCLOBENZAPRINE HCL 5 MG
5-10 TABLET ORAL 3 TIMES DAILY PRN
Qty: 60 TABLET | Refills: 4 | Status: SHIPPED | OUTPATIENT
Start: 2025-07-31 | End: 2026-07-31

## 2025-07-31 RX ORDER — TOPIRAMATE 100 MG/1
100 TABLET, FILM COATED ORAL 2 TIMES DAILY
Qty: 180 TABLET | Refills: 0 | Status: SHIPPED | OUTPATIENT
Start: 2025-07-31 | End: 2025-07-31 | Stop reason: SDUPTHER

## 2025-07-31 RX ORDER — POTASSIUM CHLORIDE 750 MG/1
10 TABLET, EXTENDED RELEASE ORAL 2 TIMES DAILY
Qty: 180 TABLET | Refills: 2 | Status: SHIPPED | OUTPATIENT
Start: 2025-07-31 | End: 2026-07-31

## (undated) DEVICE — MANIFOLD 4 PORT

## (undated) DEVICE — BANDAGE VELCLOSE ELAS 4INX5YD

## (undated) DEVICE — BLADE SURG STAINLESS STEEL #15

## (undated) DEVICE — SOL NACL IRR 1000ML BTL

## (undated) DEVICE — PAD ABDOMINAL STERILE 8X10IN

## (undated) DEVICE — CUFF ATS 2 PORT SNGL BLDR 18IN

## (undated) DEVICE — SUT 5/0 18IN PLAIN GUT D/A

## (undated) DEVICE — GLOVE PROTEXIS LTX MICRO 6

## (undated) DEVICE — PADDING WYTEX UNDRCST 6INX4YD

## (undated) DEVICE — SPONGE KERLIX SUPER 6X6.75IN

## (undated) DEVICE — DRAPE SURG W/TWL 17 5/8X23

## (undated) DEVICE — SUT 3-0 VICRYL / SH (J416)

## (undated) DEVICE — ELECTRODE PATIENT RETURN DISP

## (undated) DEVICE — GLOVE PROTEXIS BLUE LATEX 7

## (undated) DEVICE — GLOVE PROTEXIS LTX MICRO  7

## (undated) DEVICE — DRESSING XEROFORM 5X9IN

## (undated) DEVICE — BANDAGE ESMARK LATEX FREE 4INX

## (undated) DEVICE — APPLICATOR CHLORAPREP ORN 26ML

## (undated) DEVICE — GOWN X-LG STERILE BACK

## (undated) DEVICE — ELECTRODE REM PLYHSV RETURN 9

## (undated) DEVICE — GLOVE PROTEXIS HYDROGEL SZ7.5

## (undated) DEVICE — SPONGE COTTON TRAY 4X4IN

## (undated) DEVICE — DRESSING XEROFORM FOIL PK 1X8

## (undated) DEVICE — CORD BIPOLAR 12 FOOT

## (undated) DEVICE — GOWN SURGICAL XX LARGE X LONG

## (undated) DEVICE — BANDAGE VELCLOSE ELAS 2INX5YD

## (undated) DEVICE — NDL HYPO REG 25G X 1 1/2

## (undated) DEVICE — GLOVE PROTEXIS BLUE LATEX 8

## (undated) DEVICE — Device

## (undated) DEVICE — GLOVE PROTEXIS BLUE LATEX 7.5

## (undated) DEVICE — SYR 10CC LUER LOCK

## (undated) DEVICE — SUT CTD VICRYL 2.0

## (undated) DEVICE — DRESSING GAUZE XEROFORM 5X9

## (undated) DEVICE — SEE MEDLINE ITEM 157173

## (undated) DEVICE — KIT SURGICAL TURNOVER

## (undated) DEVICE — TOURNIQUET SB QC DP 18X4IN

## (undated) DEVICE — SUT 4-0 ETHILON 18 PS-2

## (undated) DEVICE — SUT ETHILON 3-0 FS-1 30

## (undated) DEVICE — PADDING 4X4YD SPECIALIST100

## (undated) DEVICE — KIT BASIC ORTHO UNIVERSITY

## (undated) DEVICE — GOWN NONREINF SET-IN SLV XL

## (undated) DEVICE — SUT ETHILON 4-0 PS2 18 BLK

## (undated) DEVICE — HANDLE DEVON RIGID OR LIGHT

## (undated) DEVICE — BANDAGE ROLL COTTN 4.5INX4.1YD

## (undated) DEVICE — SLING ARM SIZE LARGE

## (undated) DEVICE — DRAPE HAND STERILE

## (undated) DEVICE — GLOVE PROTEXIS HYDROGEL SZ7

## (undated) DEVICE — GAUZE SPONGE 4X4 12PLY

## (undated) DEVICE — GLOVE PROTEXIS HYDROGEL SZ8